# Patient Record
Sex: MALE | Race: WHITE | NOT HISPANIC OR LATINO | Employment: OTHER | URBAN - METROPOLITAN AREA
[De-identification: names, ages, dates, MRNs, and addresses within clinical notes are randomized per-mention and may not be internally consistent; named-entity substitution may affect disease eponyms.]

---

## 2017-01-25 PROBLEM — E78.5 HYPERLIPIDEMIA: Status: ACTIVE | Noted: 2017-01-25

## 2017-11-01 PROBLEM — F32.A DEPRESSION: Status: ACTIVE | Noted: 2017-11-01

## 2018-01-12 PROBLEM — R61 EXCESSIVE SWEATING: Status: ACTIVE | Noted: 2018-01-12

## 2018-01-12 PROBLEM — N40.0 PROSTATISM: Status: ACTIVE | Noted: 2018-01-12

## 2018-05-02 PROBLEM — Z86.010 HX OF COLONIC POLYP: Status: ACTIVE | Noted: 2018-05-02

## 2018-05-02 PROBLEM — Z91.030 ALLERGY TO HONEY BEE VENOM: Status: ACTIVE | Noted: 2018-05-02

## 2019-01-25 ENCOUNTER — PATIENT OUTREACH (OUTPATIENT)
Dept: HEALTH INFORMATION MANAGEMENT | Facility: OTHER | Age: 59
End: 2019-01-25

## 2019-01-26 NOTE — PROGRESS NOTES
Outcome: Left Message    Please transfer to Patient Outreach Team at [phone] when patient returns call.    WebIZ Checked & Epic Updated:  yes    [facility] Verified: yes    Attempt # 1

## 2019-01-30 NOTE — PROGRESS NOTES
Outcome: Left Message    Please transfer to Patient Outreach Team at [phone] when patient returns call.    Attempt # 2

## 2019-02-01 NOTE — PROGRESS NOTES
Outcome: Left Message    Please transfer to Patient Outreach Team at [phone] when patient returns call.    Attempt # 3

## 2019-02-06 PROBLEM — J45.20 MILD INTERMITTENT ASTHMA WITHOUT COMPLICATION: Status: ACTIVE | Noted: 2019-02-06

## 2019-02-11 PROBLEM — R91.8 PULMONARY NODULES: Status: ACTIVE | Noted: 2019-02-11

## 2019-04-10 PROBLEM — G89.29 CHRONIC LOW BACK PAIN WITHOUT SCIATICA: Status: ACTIVE | Noted: 2019-04-10

## 2019-04-10 PROBLEM — M54.50 CHRONIC LOW BACK PAIN WITHOUT SCIATICA: Status: ACTIVE | Noted: 2019-04-10

## 2019-06-05 PROBLEM — Z78.9 DAILY CONSUMPTION OF ALCOHOL: Status: ACTIVE | Noted: 2019-06-05

## 2020-09-04 PROBLEM — G25.81 RESTLESS LEG SYNDROME: Status: ACTIVE | Noted: 2020-09-04

## 2020-10-07 PROBLEM — K76.0 HEPATIC STEATOSIS: Status: ACTIVE | Noted: 2020-10-07

## 2021-01-27 PROBLEM — K56.609 SBO (SMALL BOWEL OBSTRUCTION) (HCC): Status: ACTIVE | Noted: 2021-01-27

## 2021-09-15 PROBLEM — R73.03 PREDIABETES: Status: ACTIVE | Noted: 2021-09-15

## 2022-06-06 PROBLEM — I25.10 CORONARY ARTERY DISEASE INVOLVING NATIVE CORONARY ARTERY OF NATIVE HEART WITHOUT ANGINA PECTORIS: Status: ACTIVE | Noted: 2022-06-06

## 2022-06-06 PROBLEM — I50.20 SYSTOLIC CONGESTIVE HEART FAILURE (HCC): Status: ACTIVE | Noted: 2022-06-06

## 2022-06-06 PROBLEM — I25.5 ISCHEMIC CARDIOMYOPATHY: Status: ACTIVE | Noted: 2022-06-06

## 2022-07-01 PROBLEM — N17.9 ACUTE KIDNEY FAILURE (HCC): Status: ACTIVE | Noted: 2022-07-01

## 2022-07-01 PROBLEM — E87.20 LACTIC ACIDOSIS: Status: ACTIVE | Noted: 2022-07-01

## 2022-07-02 PROBLEM — E87.20 LACTIC ACIDOSIS: Status: RESOLVED | Noted: 2022-07-01 | Resolved: 2022-07-02

## 2022-07-02 PROBLEM — K56.609 SBO (SMALL BOWEL OBSTRUCTION) (HCC): Status: RESOLVED | Noted: 2021-01-27 | Resolved: 2022-07-02

## 2022-09-13 PROBLEM — I50.32 CHRONIC DIASTOLIC HEART FAILURE (HCC): Status: ACTIVE | Noted: 2022-09-13

## 2022-09-13 PROBLEM — E66.9 OBESITY (BMI 30.0-34.9): Status: ACTIVE | Noted: 2022-09-13

## 2022-09-13 PROBLEM — R79.89 ELEVATED LACTIC ACID LEVEL: Status: ACTIVE | Noted: 2022-09-13

## 2022-09-13 PROBLEM — D72.829 LEUKOCYTOSIS: Status: ACTIVE | Noted: 2022-09-13

## 2022-09-13 PROBLEM — K56.609 SMALL BOWEL OBSTRUCTION (HCC): Status: ACTIVE | Noted: 2022-09-13

## 2023-02-28 ENCOUNTER — ANESTHESIA EVENT (OUTPATIENT)
Dept: SURGERY | Facility: MEDICAL CENTER | Age: 63
DRG: 327 | End: 2023-02-28
Payer: MEDICARE

## 2023-02-28 ENCOUNTER — HOSPITAL ENCOUNTER (OUTPATIENT)
Dept: RADIOLOGY | Facility: MEDICAL CENTER | Age: 63
End: 2023-02-28

## 2023-02-28 ENCOUNTER — HOSPITAL ENCOUNTER (INPATIENT)
Facility: MEDICAL CENTER | Age: 63
LOS: 4 days | DRG: 327 | End: 2023-03-04
Attending: HOSPITALIST | Admitting: STUDENT IN AN ORGANIZED HEALTH CARE EDUCATION/TRAINING PROGRAM
Payer: MEDICARE

## 2023-02-28 ENCOUNTER — ANESTHESIA (OUTPATIENT)
Dept: SURGERY | Facility: MEDICAL CENTER | Age: 63
DRG: 327 | End: 2023-02-28
Payer: MEDICARE

## 2023-02-28 DIAGNOSIS — K56.609 SMALL BOWEL OBSTRUCTION (HCC): ICD-10-CM

## 2023-02-28 PROBLEM — R73.9 HYPERGLYCEMIA: Status: ACTIVE | Noted: 2023-02-28

## 2023-02-28 PROBLEM — N17.9 AKI (ACUTE KIDNEY INJURY) (HCC): Status: ACTIVE | Noted: 2023-02-28

## 2023-02-28 PROBLEM — E11.9 TYPE 2 DIABETES MELLITUS, WITHOUT LONG-TERM CURRENT USE OF INSULIN (HCC): Status: ACTIVE | Noted: 2023-02-28

## 2023-02-28 PROBLEM — N17.9 ACUTE KIDNEY INJURY (HCC): Status: RESOLVED | Noted: 2022-07-01 | Resolved: 2023-02-28

## 2023-02-28 PROBLEM — D72.829 LEUKOCYTOSIS: Status: RESOLVED | Noted: 2022-09-13 | Resolved: 2023-02-28

## 2023-02-28 PROBLEM — K43.6 VENTRAL HERNIA WITH BOWEL OBSTRUCTION: Status: ACTIVE | Noted: 2023-02-28

## 2023-02-28 PROBLEM — E66.01 OBESITY, MORBID, BMI 40.0-49.9 (HCC): Status: ACTIVE | Noted: 2023-02-28

## 2023-02-28 PROBLEM — I50.30 (HFPEF) HEART FAILURE WITH PRESERVED EJECTION FRACTION (HCC): Status: ACTIVE | Noted: 2023-02-28

## 2023-02-28 PROBLEM — D72.829 LEUKOCYTOSIS: Status: ACTIVE | Noted: 2023-02-28

## 2023-02-28 LAB
ALBUMIN SERPL BCP-MCNC: 4.8 G/DL (ref 3.2–4.9)
ALBUMIN/GLOB SERPL: 1.3 G/DL
ALP SERPL-CCNC: 68 U/L (ref 30–99)
ALT SERPL-CCNC: 28 U/L (ref 2–50)
ANION GAP SERPL CALC-SCNC: 21 MMOL/L (ref 7–16)
AST SERPL-CCNC: 26 U/L (ref 12–45)
BASOPHILS # BLD AUTO: 0.4 % (ref 0–1.8)
BASOPHILS # BLD: 0.07 K/UL (ref 0–0.12)
BILIRUB SERPL-MCNC: 1.3 MG/DL (ref 0.1–1.5)
BUN SERPL-MCNC: 32 MG/DL (ref 8–22)
CALCIUM ALBUM COR SERPL-MCNC: 10 MG/DL (ref 8.5–10.5)
CALCIUM SERPL-MCNC: 10.6 MG/DL (ref 8.5–10.5)
CHLORIDE SERPL-SCNC: 98 MMOL/L (ref 96–112)
CO2 SERPL-SCNC: 20 MMOL/L (ref 20–33)
CREAT SERPL-MCNC: 1.88 MG/DL (ref 0.5–1.4)
EOSINOPHIL # BLD AUTO: 0.07 K/UL (ref 0–0.51)
EOSINOPHIL NFR BLD: 0.4 % (ref 0–6.9)
ERYTHROCYTE [DISTWIDTH] IN BLOOD BY AUTOMATED COUNT: 45.6 FL (ref 35.9–50)
EST. AVERAGE GLUCOSE BLD GHB EST-MCNC: 134 MG/DL
GFR SERPLBLD CREATININE-BSD FMLA CKD-EPI: 39 ML/MIN/1.73 M 2
GLOBULIN SER CALC-MCNC: 3.7 G/DL (ref 1.9–3.5)
GLUCOSE BLD STRIP.AUTO-MCNC: 132 MG/DL (ref 65–99)
GLUCOSE BLD STRIP.AUTO-MCNC: 154 MG/DL (ref 65–99)
GLUCOSE BLD STRIP.AUTO-MCNC: 204 MG/DL (ref 65–99)
GLUCOSE SERPL-MCNC: 214 MG/DL (ref 65–99)
HBA1C MFR BLD: 6.3 % (ref 4–5.6)
HCT VFR BLD AUTO: 49.7 % (ref 42–52)
HGB BLD-MCNC: 16.9 G/DL (ref 14–18)
IMM GRANULOCYTES # BLD AUTO: 0.08 K/UL (ref 0–0.11)
IMM GRANULOCYTES NFR BLD AUTO: 0.4 % (ref 0–0.9)
LACTATE SERPL-SCNC: 2.1 MMOL/L (ref 0.5–2)
LACTATE SERPL-SCNC: 2.1 MMOL/L (ref 0.5–2)
LACTATE SERPL-SCNC: 3.5 MMOL/L (ref 0.5–2)
LACTATE SERPL-SCNC: 7 MMOL/L (ref 0.5–2)
LYMPHOCYTES # BLD AUTO: 1.03 K/UL (ref 1–4.8)
LYMPHOCYTES NFR BLD: 5.7 % (ref 22–41)
MAGNESIUM SERPL-MCNC: 1.9 MG/DL (ref 1.5–2.5)
MCH RBC QN AUTO: 32.1 PG (ref 27–33)
MCHC RBC AUTO-ENTMCNC: 34 G/DL (ref 33.7–35.3)
MCV RBC AUTO: 94.3 FL (ref 81.4–97.8)
MONOCYTES # BLD AUTO: 2.15 K/UL (ref 0–0.85)
MONOCYTES NFR BLD AUTO: 11.9 % (ref 0–13.4)
NEUTROPHILS # BLD AUTO: 14.66 K/UL (ref 1.82–7.42)
NEUTROPHILS NFR BLD: 81.2 % (ref 44–72)
NRBC # BLD AUTO: 0.03 K/UL
NRBC BLD-RTO: 0.2 /100 WBC
PLATELET # BLD AUTO: 355 K/UL (ref 164–446)
PMV BLD AUTO: 10.4 FL (ref 9–12.9)
POTASSIUM SERPL-SCNC: 4.7 MMOL/L (ref 3.6–5.5)
PROCALCITONIN SERPL-MCNC: 0.2 NG/ML
PROT SERPL-MCNC: 8.5 G/DL (ref 6–8.2)
RBC # BLD AUTO: 5.27 M/UL (ref 4.7–6.1)
SODIUM SERPL-SCNC: 139 MMOL/L (ref 135–145)
WBC # BLD AUTO: 18.1 K/UL (ref 4.8–10.8)

## 2023-02-28 PROCEDURE — 83605 ASSAY OF LACTIC ACID: CPT

## 2023-02-28 PROCEDURE — 83036 HEMOGLOBIN GLYCOSYLATED A1C: CPT

## 2023-02-28 PROCEDURE — 0DN60ZZ RELEASE STOMACH, OPEN APPROACH: ICD-10-PCS | Performed by: SURGERY

## 2023-02-28 PROCEDURE — 85025 COMPLETE CBC W/AUTO DIFF WBC: CPT

## 2023-02-28 PROCEDURE — 700105 HCHG RX REV CODE 258: Performed by: STUDENT IN AN ORGANIZED HEALTH CARE EDUCATION/TRAINING PROGRAM

## 2023-02-28 PROCEDURE — 700105 HCHG RX REV CODE 258: Performed by: GENERAL PRACTICE

## 2023-02-28 PROCEDURE — 700111 HCHG RX REV CODE 636 W/ 250 OVERRIDE (IP): Performed by: ANESTHESIOLOGY

## 2023-02-28 PROCEDURE — 700111 HCHG RX REV CODE 636 W/ 250 OVERRIDE (IP): Performed by: GENERAL PRACTICE

## 2023-02-28 PROCEDURE — 160035 HCHG PACU - 1ST 60 MINS PHASE I: Performed by: SURGERY

## 2023-02-28 PROCEDURE — 99223 1ST HOSP IP/OBS HIGH 75: CPT | Mod: 57 | Performed by: SURGERY

## 2023-02-28 PROCEDURE — 82962 GLUCOSE BLOOD TEST: CPT

## 2023-02-28 PROCEDURE — 770001 HCHG ROOM/CARE - MED/SURG/GYN PRIV*

## 2023-02-28 PROCEDURE — 0DN80ZZ RELEASE SMALL INTESTINE, OPEN APPROACH: ICD-10-PCS | Performed by: SURGERY

## 2023-02-28 PROCEDURE — 700111 HCHG RX REV CODE 636 W/ 250 OVERRIDE (IP)

## 2023-02-28 PROCEDURE — 160031 HCHG SURGERY MINUTES - 1ST 30 MINS LEVEL 5: Performed by: SURGERY

## 2023-02-28 PROCEDURE — 700111 HCHG RX REV CODE 636 W/ 250 OVERRIDE (IP): Performed by: STUDENT IN AN ORGANIZED HEALTH CARE EDUCATION/TRAINING PROGRAM

## 2023-02-28 PROCEDURE — 700102 HCHG RX REV CODE 250 W/ 637 OVERRIDE(OP): Performed by: GENERAL PRACTICE

## 2023-02-28 PROCEDURE — 36620 INSERTION CATHETER ARTERY: CPT | Performed by: ANESTHESIOLOGY

## 2023-02-28 PROCEDURE — 700101 HCHG RX REV CODE 250: Performed by: STUDENT IN AN ORGANIZED HEALTH CARE EDUCATION/TRAINING PROGRAM

## 2023-02-28 PROCEDURE — 49000 EXPLORATION OF ABDOMEN: CPT | Performed by: SURGERY

## 2023-02-28 PROCEDURE — 160036 HCHG PACU - EA ADDL 30 MINS PHASE I: Performed by: SURGERY

## 2023-02-28 PROCEDURE — 00790 ANES IPER UPR ABD NOS: CPT | Performed by: ANESTHESIOLOGY

## 2023-02-28 PROCEDURE — 160009 HCHG ANES TIME/MIN: Performed by: SURGERY

## 2023-02-28 PROCEDURE — 80053 COMPREHEN METABOLIC PANEL: CPT

## 2023-02-28 PROCEDURE — 700101 HCHG RX REV CODE 250: Performed by: ANESTHESIOLOGY

## 2023-02-28 PROCEDURE — 160048 HCHG OR STATISTICAL LEVEL 1-5: Performed by: SURGERY

## 2023-02-28 PROCEDURE — 84145 PROCALCITONIN (PCT): CPT

## 2023-02-28 PROCEDURE — 0DNE0ZZ RELEASE LARGE INTESTINE, OPEN APPROACH: ICD-10-PCS | Performed by: SURGERY

## 2023-02-28 PROCEDURE — 700111 HCHG RX REV CODE 636 W/ 250 OVERRIDE (IP): Performed by: SURGERY

## 2023-02-28 PROCEDURE — 36415 COLL VENOUS BLD VENIPUNCTURE: CPT

## 2023-02-28 PROCEDURE — 99223 1ST HOSP IP/OBS HIGH 75: CPT | Mod: AI,GC | Performed by: STUDENT IN AN ORGANIZED HEALTH CARE EDUCATION/TRAINING PROGRAM

## 2023-02-28 PROCEDURE — 160002 HCHG RECOVERY MINUTES (STAT): Performed by: SURGERY

## 2023-02-28 PROCEDURE — 700105 HCHG RX REV CODE 258: Performed by: ANESTHESIOLOGY

## 2023-02-28 PROCEDURE — 160042 HCHG SURGERY MINUTES - EA ADDL 1 MIN LEVEL 5: Performed by: SURGERY

## 2023-02-28 PROCEDURE — 83735 ASSAY OF MAGNESIUM: CPT

## 2023-02-28 PROCEDURE — C9399 UNCLASSIFIED DRUGS OR BIOLOG: HCPCS | Performed by: SURGERY

## 2023-02-28 RX ORDER — HYDROMORPHONE HYDROCHLORIDE 1 MG/ML
0.2 INJECTION, SOLUTION INTRAMUSCULAR; INTRAVENOUS; SUBCUTANEOUS
Status: DISCONTINUED | OUTPATIENT
Start: 2023-02-28 | End: 2023-02-28 | Stop reason: HOSPADM

## 2023-02-28 RX ORDER — HYDROMORPHONE HYDROCHLORIDE 1 MG/ML
0.1 INJECTION, SOLUTION INTRAMUSCULAR; INTRAVENOUS; SUBCUTANEOUS
Status: DISCONTINUED | OUTPATIENT
Start: 2023-02-28 | End: 2023-02-28

## 2023-02-28 RX ORDER — HYDROMORPHONE HYDROCHLORIDE 1 MG/ML
0.4 INJECTION, SOLUTION INTRAMUSCULAR; INTRAVENOUS; SUBCUTANEOUS
Status: DISCONTINUED | OUTPATIENT
Start: 2023-02-28 | End: 2023-02-28 | Stop reason: HOSPADM

## 2023-02-28 RX ORDER — SUCCINYLCHOLINE CHLORIDE 20 MG/ML
INJECTION INTRAMUSCULAR; INTRAVENOUS PRN
Status: DISCONTINUED | OUTPATIENT
Start: 2023-02-28 | End: 2023-02-28 | Stop reason: SURG

## 2023-02-28 RX ORDER — SODIUM CHLORIDE, SODIUM LACTATE, POTASSIUM CHLORIDE, CALCIUM CHLORIDE 600; 310; 30; 20 MG/100ML; MG/100ML; MG/100ML; MG/100ML
INJECTION, SOLUTION INTRAVENOUS CONTINUOUS
Status: DISCONTINUED | OUTPATIENT
Start: 2023-02-28 | End: 2023-02-28 | Stop reason: HOSPADM

## 2023-02-28 RX ORDER — HYDROMORPHONE HYDROCHLORIDE 1 MG/ML
0.1 INJECTION, SOLUTION INTRAMUSCULAR; INTRAVENOUS; SUBCUTANEOUS
Status: DISCONTINUED | OUTPATIENT
Start: 2023-02-28 | End: 2023-02-28 | Stop reason: HOSPADM

## 2023-02-28 RX ORDER — CEFOTETAN DISODIUM 2 G/20ML
INJECTION, POWDER, FOR SOLUTION INTRAMUSCULAR; INTRAVENOUS PRN
Status: DISCONTINUED | OUTPATIENT
Start: 2023-02-28 | End: 2023-02-28 | Stop reason: SURG

## 2023-02-28 RX ORDER — HYDROMORPHONE HYDROCHLORIDE 1 MG/ML
0.4 INJECTION, SOLUTION INTRAMUSCULAR; INTRAVENOUS; SUBCUTANEOUS
Status: DISCONTINUED | OUTPATIENT
Start: 2023-02-28 | End: 2023-02-28

## 2023-02-28 RX ORDER — HYDROMORPHONE HYDROCHLORIDE 1 MG/ML
0.2 INJECTION, SOLUTION INTRAMUSCULAR; INTRAVENOUS; SUBCUTANEOUS
Status: DISCONTINUED | OUTPATIENT
Start: 2023-02-28 | End: 2023-02-28

## 2023-02-28 RX ORDER — EPHEDRINE SULFATE 50 MG/ML
5 INJECTION, SOLUTION INTRAVENOUS
Status: DISCONTINUED | OUTPATIENT
Start: 2023-02-28 | End: 2023-02-28 | Stop reason: HOSPADM

## 2023-02-28 RX ORDER — HALOPERIDOL 5 MG/ML
1 INJECTION INTRAMUSCULAR
Status: DISCONTINUED | OUTPATIENT
Start: 2023-02-28 | End: 2023-02-28 | Stop reason: HOSPADM

## 2023-02-28 RX ORDER — SODIUM CHLORIDE, SODIUM LACTATE, POTASSIUM CHLORIDE, CALCIUM CHLORIDE 600; 310; 30; 20 MG/100ML; MG/100ML; MG/100ML; MG/100ML
INJECTION, SOLUTION INTRAVENOUS
Status: DISCONTINUED | OUTPATIENT
Start: 2023-02-28 | End: 2023-02-28 | Stop reason: SURG

## 2023-02-28 RX ORDER — SODIUM CHLORIDE, SODIUM LACTATE, POTASSIUM CHLORIDE, AND CALCIUM CHLORIDE .6; .31; .03; .02 G/100ML; G/100ML; G/100ML; G/100ML
500 INJECTION, SOLUTION INTRAVENOUS ONCE
Status: COMPLETED | OUTPATIENT
Start: 2023-02-28 | End: 2023-02-28

## 2023-02-28 RX ORDER — SODIUM CHLORIDE, SODIUM LACTATE, POTASSIUM CHLORIDE, CALCIUM CHLORIDE 600; 310; 30; 20 MG/100ML; MG/100ML; MG/100ML; MG/100ML
INJECTION, SOLUTION INTRAVENOUS CONTINUOUS
Status: ACTIVE | OUTPATIENT
Start: 2023-02-28 | End: 2023-02-28

## 2023-02-28 RX ORDER — HYDRALAZINE HYDROCHLORIDE 20 MG/ML
5 INJECTION INTRAMUSCULAR; INTRAVENOUS
Status: DISCONTINUED | OUTPATIENT
Start: 2023-02-28 | End: 2023-02-28 | Stop reason: HOSPADM

## 2023-02-28 RX ORDER — PHENYLEPHRINE HCL IN 0.9% NACL 0.5 MG/5ML
SYRINGE (ML) INTRAVENOUS PRN
Status: DISCONTINUED | OUTPATIENT
Start: 2023-02-28 | End: 2023-02-28 | Stop reason: SURG

## 2023-02-28 RX ORDER — DIPHENHYDRAMINE HYDROCHLORIDE 50 MG/ML
12.5 INJECTION INTRAMUSCULAR; INTRAVENOUS
Status: DISCONTINUED | OUTPATIENT
Start: 2023-02-28 | End: 2023-02-28 | Stop reason: HOSPADM

## 2023-02-28 RX ORDER — METRONIDAZOLE 500 MG/100ML
500 INJECTION, SOLUTION INTRAVENOUS EVERY 8 HOURS
Status: DISCONTINUED | OUTPATIENT
Start: 2023-02-28 | End: 2023-03-01

## 2023-02-28 RX ORDER — ALBUTEROL SULFATE 2.5 MG/3ML
2.5 SOLUTION RESPIRATORY (INHALATION)
Status: DISCONTINUED | OUTPATIENT
Start: 2023-02-28 | End: 2023-02-28 | Stop reason: HOSPADM

## 2023-02-28 RX ORDER — ONDANSETRON 2 MG/ML
INJECTION INTRAMUSCULAR; INTRAVENOUS PRN
Status: DISCONTINUED | OUTPATIENT
Start: 2023-02-28 | End: 2023-02-28 | Stop reason: SURG

## 2023-02-28 RX ORDER — HYDROMORPHONE HYDROCHLORIDE 1 MG/ML
0.5 INJECTION, SOLUTION INTRAMUSCULAR; INTRAVENOUS; SUBCUTANEOUS
Status: DISCONTINUED | OUTPATIENT
Start: 2023-02-28 | End: 2023-03-01

## 2023-02-28 RX ORDER — KETAMINE HYDROCHLORIDE 100 MG/ML
INJECTION, SOLUTION INTRAMUSCULAR; INTRAVENOUS PRN
Status: DISCONTINUED | OUTPATIENT
Start: 2023-02-28 | End: 2023-02-28 | Stop reason: SURG

## 2023-02-28 RX ORDER — MIDAZOLAM HYDROCHLORIDE 1 MG/ML
INJECTION INTRAMUSCULAR; INTRAVENOUS PRN
Status: DISCONTINUED | OUTPATIENT
Start: 2023-02-28 | End: 2023-02-28 | Stop reason: SURG

## 2023-02-28 RX ORDER — ONDANSETRON 2 MG/ML
4 INJECTION INTRAMUSCULAR; INTRAVENOUS
Status: DISCONTINUED | OUTPATIENT
Start: 2023-02-28 | End: 2023-02-28 | Stop reason: HOSPADM

## 2023-02-28 RX ORDER — SODIUM CHLORIDE, SODIUM LACTATE, POTASSIUM CHLORIDE, AND CALCIUM CHLORIDE .6; .31; .03; .02 G/100ML; G/100ML; G/100ML; G/100ML
1000 INJECTION, SOLUTION INTRAVENOUS ONCE
Status: DISCONTINUED | OUTPATIENT
Start: 2023-02-28 | End: 2023-02-28

## 2023-02-28 RX ORDER — PROCHLORPERAZINE EDISYLATE 5 MG/ML
5 INJECTION INTRAMUSCULAR; INTRAVENOUS EVERY 6 HOURS PRN
Status: DISCONTINUED | OUTPATIENT
Start: 2023-02-28 | End: 2023-03-04 | Stop reason: HOSPADM

## 2023-02-28 RX ORDER — LABETALOL HYDROCHLORIDE 5 MG/ML
5 INJECTION, SOLUTION INTRAVENOUS
Status: DISCONTINUED | OUTPATIENT
Start: 2023-02-28 | End: 2023-02-28 | Stop reason: HOSPADM

## 2023-02-28 RX ORDER — SODIUM CHLORIDE, SODIUM LACTATE, POTASSIUM CHLORIDE, CALCIUM CHLORIDE 600; 310; 30; 20 MG/100ML; MG/100ML; MG/100ML; MG/100ML
INJECTION, SOLUTION INTRAVENOUS CONTINUOUS
Status: DISCONTINUED | OUTPATIENT
Start: 2023-02-28 | End: 2023-03-02

## 2023-02-28 RX ORDER — DEXAMETHASONE SODIUM PHOSPHATE 4 MG/ML
INJECTION, SOLUTION INTRA-ARTICULAR; INTRALESIONAL; INTRAMUSCULAR; INTRAVENOUS; SOFT TISSUE PRN
Status: DISCONTINUED | OUTPATIENT
Start: 2023-02-28 | End: 2023-02-28 | Stop reason: SURG

## 2023-02-28 RX ADMIN — METRONIDAZOLE 500 MG: 5 INJECTION, SOLUTION INTRAVENOUS at 15:18

## 2023-02-28 RX ADMIN — HYDROMORPHONE HYDROCHLORIDE 0.5 MG: 1 INJECTION, SOLUTION INTRAMUSCULAR; INTRAVENOUS; SUBCUTANEOUS at 07:29

## 2023-02-28 RX ADMIN — IDARUCIZUMAB 2.5 G: 50 INJECTION INTRAVENOUS at 09:15

## 2023-02-28 RX ADMIN — FENTANYL CITRATE 50 MCG: 50 INJECTION, SOLUTION INTRAMUSCULAR; INTRAVENOUS at 12:37

## 2023-02-28 RX ADMIN — SODIUM CHLORIDE, POTASSIUM CHLORIDE, SODIUM LACTATE AND CALCIUM CHLORIDE 500 ML: 600; 310; 30; 20 INJECTION, SOLUTION INTRAVENOUS at 06:35

## 2023-02-28 RX ADMIN — PROPOFOL 250 MG: 10 INJECTION, EMULSION INTRAVENOUS at 11:09

## 2023-02-28 RX ADMIN — HYDROMORPHONE HYDROCHLORIDE 0.5 MG: 1 INJECTION, SOLUTION INTRAMUSCULAR; INTRAVENOUS; SUBCUTANEOUS at 03:19

## 2023-02-28 RX ADMIN — ROCURONIUM BROMIDE 30 MG: 10 INJECTION, SOLUTION INTRAVENOUS at 12:11

## 2023-02-28 RX ADMIN — INSULIN HUMAN 2 UNITS: 100 INJECTION, SOLUTION PARENTERAL at 08:12

## 2023-02-28 RX ADMIN — SUCCINYLCHOLINE CHLORIDE 200 MG: 20 INJECTION, SOLUTION INTRAMUSCULAR; INTRAVENOUS; PARENTERAL at 11:09

## 2023-02-28 RX ADMIN — METRONIDAZOLE 500 MG: 5 INJECTION, SOLUTION INTRAVENOUS at 22:40

## 2023-02-28 RX ADMIN — INSULIN HUMAN 1 UNITS: 100 INJECTION, SOLUTION PARENTERAL at 17:40

## 2023-02-28 RX ADMIN — HYDROMORPHONE HYDROCHLORIDE 0.5 MG: 1 INJECTION, SOLUTION INTRAMUSCULAR; INTRAVENOUS; SUBCUTANEOUS at 20:31

## 2023-02-28 RX ADMIN — HYDROMORPHONE HYDROCHLORIDE 0.5 MG: 1 INJECTION, SOLUTION INTRAMUSCULAR; INTRAVENOUS; SUBCUTANEOUS at 16:37

## 2023-02-28 RX ADMIN — Medication 200 MCG: at 12:02

## 2023-02-28 RX ADMIN — KETAMINE HYDROCHLORIDE 40 MG: 100 INJECTION INTRAMUSCULAR; INTRAVENOUS at 11:52

## 2023-02-28 RX ADMIN — CEFOTETAN DISODIUM 2 G: 2 INJECTION, POWDER, FOR SOLUTION INTRAMUSCULAR; INTRAVENOUS at 11:09

## 2023-02-28 RX ADMIN — FENTANYL CITRATE 50 MCG: 50 INJECTION, SOLUTION INTRAMUSCULAR; INTRAVENOUS at 13:35

## 2023-02-28 RX ADMIN — SODIUM CHLORIDE, POTASSIUM CHLORIDE, SODIUM LACTATE AND CALCIUM CHLORIDE: 600; 310; 30; 20 INJECTION, SOLUTION INTRAVENOUS at 03:24

## 2023-02-28 RX ADMIN — IDARUCIZUMAB 2.5 G: 50 INJECTION INTRAVENOUS at 09:25

## 2023-02-28 RX ADMIN — SUGAMMADEX 200 MG: 100 INJECTION, SOLUTION INTRAVENOUS at 12:28

## 2023-02-28 RX ADMIN — HYDROMORPHONE HYDROCHLORIDE 0.4 MG: 1 INJECTION, SOLUTION INTRAMUSCULAR; INTRAVENOUS; SUBCUTANEOUS at 13:55

## 2023-02-28 RX ADMIN — HYDROMORPHONE HYDROCHLORIDE 0.4 MG: 1 INJECTION, SOLUTION INTRAMUSCULAR; INTRAVENOUS; SUBCUTANEOUS at 13:45

## 2023-02-28 RX ADMIN — SUGAMMADEX 200 MG: 100 INJECTION, SOLUTION INTRAVENOUS at 12:33

## 2023-02-28 RX ADMIN — MIDAZOLAM HYDROCHLORIDE 2 MG: 1 INJECTION, SOLUTION INTRAMUSCULAR; INTRAVENOUS at 11:04

## 2023-02-28 RX ADMIN — PROCHLORPERAZINE EDISYLATE 5 MG: 5 INJECTION INTRAMUSCULAR; INTRAVENOUS at 03:19

## 2023-02-28 RX ADMIN — CEFTRIAXONE SODIUM 2000 MG: 10 INJECTION, POWDER, FOR SOLUTION INTRAVENOUS at 16:37

## 2023-02-28 RX ADMIN — DEXAMETHASONE SODIUM PHOSPHATE 4 MG: 4 INJECTION, SOLUTION INTRA-ARTICULAR; INTRALESIONAL; INTRAMUSCULAR; INTRAVENOUS; SOFT TISSUE at 12:26

## 2023-02-28 RX ADMIN — FENTANYL CITRATE 50 MCG: 50 INJECTION, SOLUTION INTRAMUSCULAR; INTRAVENOUS at 13:40

## 2023-02-28 RX ADMIN — FENTANYL CITRATE 50 MCG: 50 INJECTION, SOLUTION INTRAMUSCULAR; INTRAVENOUS at 11:06

## 2023-02-28 RX ADMIN — SODIUM CHLORIDE, POTASSIUM CHLORIDE, SODIUM LACTATE AND CALCIUM CHLORIDE: 600; 310; 30; 20 INJECTION, SOLUTION INTRAVENOUS at 15:13

## 2023-02-28 RX ADMIN — SODIUM CHLORIDE, POTASSIUM CHLORIDE, SODIUM LACTATE AND CALCIUM CHLORIDE: 600; 310; 30; 20 INJECTION, SOLUTION INTRAVENOUS at 11:02

## 2023-02-28 RX ADMIN — PROCHLORPERAZINE EDISYLATE 5 MG: 5 INJECTION INTRAMUSCULAR; INTRAVENOUS at 09:20

## 2023-02-28 RX ADMIN — PROCHLORPERAZINE EDISYLATE 5 MG: 5 INJECTION INTRAMUSCULAR; INTRAVENOUS at 20:31

## 2023-02-28 RX ADMIN — FENTANYL CITRATE 50 MCG: 50 INJECTION, SOLUTION INTRAMUSCULAR; INTRAVENOUS at 12:06

## 2023-02-28 RX ADMIN — FENTANYL CITRATE 50 MCG: 50 INJECTION, SOLUTION INTRAMUSCULAR; INTRAVENOUS at 11:41

## 2023-02-28 RX ADMIN — HYDROMORPHONE HYDROCHLORIDE 0.2 MG: 1 INJECTION, SOLUTION INTRAMUSCULAR; INTRAVENOUS; SUBCUTANEOUS at 14:00

## 2023-02-28 RX ADMIN — ONDANSETRON 4 MG: 2 INJECTION INTRAMUSCULAR; INTRAVENOUS at 12:26

## 2023-02-28 RX ADMIN — ROCURONIUM BROMIDE 50 MG: 10 INJECTION, SOLUTION INTRAVENOUS at 11:15

## 2023-02-28 ASSESSMENT — FIBROSIS 4 INDEX: FIB4 SCORE: 0.9

## 2023-02-28 ASSESSMENT — PATIENT HEALTH QUESTIONNAIRE - PHQ9
8. MOVING OR SPEAKING SO SLOWLY THAT OTHER PEOPLE COULD HAVE NOTICED. OR THE OPPOSITE, BEING SO FIGETY OR RESTLESS THAT YOU HAVE BEEN MOVING AROUND A LOT MORE THAN USUAL: NOT AT ALL
2. FEELING DOWN, DEPRESSED, IRRITABLE, OR HOPELESS: SEVERAL DAYS
4. FEELING TIRED OR HAVING LITTLE ENERGY: SEVERAL DAYS
3. TROUBLE FALLING OR STAYING ASLEEP OR SLEEPING TOO MUCH: NOT AT ALL
SUM OF ALL RESPONSES TO PHQ9 QUESTIONS 1 AND 2: 1
9. THOUGHTS THAT YOU WOULD BE BETTER OFF DEAD, OR OF HURTING YOURSELF: NOT AT ALL
5. POOR APPETITE OR OVEREATING: NOT AT ALL
SUM OF ALL RESPONSES TO PHQ QUESTIONS 1-9: 2
7. TROUBLE CONCENTRATING ON THINGS, SUCH AS READING THE NEWSPAPER OR WATCHING TELEVISION: NOT AT ALL
6. FEELING BAD ABOUT YOURSELF - OR THAT YOU ARE A FAILURE OR HAVE LET YOURSELF OR YOUR FAMILY DOWN: NOT AL ALL
1. LITTLE INTEREST OR PLEASURE IN DOING THINGS: NOT AT ALL

## 2023-02-28 ASSESSMENT — COGNITIVE AND FUNCTIONAL STATUS - GENERAL
SUGGESTED CMS G CODE MODIFIER MOBILITY: CK
MOVING TO AND FROM BED TO CHAIR: A LITTLE
MOBILITY SCORE: 17
HELP NEEDED FOR BATHING: A LITTLE
DRESSING REGULAR LOWER BODY CLOTHING: A LITTLE
MOVING FROM LYING ON BACK TO SITTING ON SIDE OF FLAT BED: A LITTLE
CLIMB 3 TO 5 STEPS WITH RAILING: A LITTLE
WALKING IN HOSPITAL ROOM: A LOT
TOILETING: A LITTLE
DRESSING REGULAR UPPER BODY CLOTHING: A LITTLE
TURNING FROM BACK TO SIDE WHILE IN FLAT BAD: A LITTLE
SUGGESTED CMS G CODE MODIFIER DAILY ACTIVITY: CJ
STANDING UP FROM CHAIR USING ARMS: A LITTLE
DAILY ACTIVITIY SCORE: 20

## 2023-02-28 ASSESSMENT — LIFESTYLE VARIABLES
TOTAL SCORE: 2
EVER FELT BAD OR GUILTY ABOUT YOUR DRINKING: YES
ON A TYPICAL DAY WHEN YOU DRINK ALCOHOL HOW MANY DRINKS DO YOU HAVE: 2
ALCOHOL_USE: YES
HAVE PEOPLE ANNOYED YOU BY CRITICIZING YOUR DRINKING: NO
CONSUMPTION TOTAL: POSITIVE
TOTAL SCORE: 2
DOES PATIENT WANT TO TALK TO SOMEONE ABOUT QUITTING: NO
HAVE YOU EVER FELT YOU SHOULD CUT DOWN ON YOUR DRINKING: YES
EVER HAD A DRINK FIRST THING IN THE MORNING TO STEADY YOUR NERVES TO GET RID OF A HANGOVER: NO
TOTAL SCORE: 2
AVERAGE NUMBER OF DAYS PER WEEK YOU HAVE A DRINK CONTAINING ALCOHOL: 7
DOES PATIENT WANT TO STOP DRINKING: YES
HOW MANY TIMES IN THE PAST YEAR HAVE YOU HAD 5 OR MORE DRINKS IN A DAY: 150

## 2023-02-28 ASSESSMENT — ENCOUNTER SYMPTOMS
COUGH: 0
HEARTBURN: 0
SHORTNESS OF BREATH: 0
HEADACHES: 0
PALPITATIONS: 0
VOMITING: 1
DIARRHEA: 0
DIZZINESS: 0
FEVER: 0
CHILLS: 0
NAUSEA: 1
CONSTIPATION: 0
ABDOMINAL PAIN: 1
WEAKNESS: 0

## 2023-02-28 ASSESSMENT — PAIN DESCRIPTION - PAIN TYPE
TYPE: ACUTE PAIN
TYPE: SURGICAL PAIN
TYPE: ACUTE PAIN;SURGICAL PAIN
TYPE: ACUTE PAIN
TYPE: ACUTE PAIN;SURGICAL PAIN

## 2023-02-28 ASSESSMENT — PAIN SCALES - GENERAL: PAIN_LEVEL: 4

## 2023-02-28 NOTE — OR NURSING
1239: Pt arrived from OR, handoff received from anesthesiologist and RN. Patient waking up, moving all extremities, denies pain and nausea. NG tube in place-low continuous suction, per order. Delacruz catheter draining to gravity. Patient with chronic afib, rate 100s-anesthesia aware.     1300: Patient more awake, states mild pain-anesthesia paged for orders.     1315: Patient sleeping, appears comfortable.     1330: Medicated for pain per MAR. Patient's daughter updated on status.     1400: Continuing to medicate for pain, per pt, pain more tolerable.     1415: Report given to T4 Fransisco NÚÑEZ.

## 2023-02-28 NOTE — ANESTHESIA PROCEDURE NOTES
Airway    Date/Time: 2/28/2023 11:10 AM  Performed by: Williams Santizo M.D.  Authorized by: Williams Santizo M.D.     Location:  OR  Urgency:  Elective  Indications for Airway Management:  Anesthesia      Spontaneous Ventilation: absent    Sedation Level:  Deep  Preoxygenated: Yes    Patient Position:  Sniffing  Mask Difficulty Assessment:  0 - not attempted  Final Airway Type:  Endotracheal airway  Final Endotracheal Airway:  ETT  Cuffed: Yes    Technique Used for Successful ETT Placement:  Video laryngoscopy    Insertion Site:  Oral  Blade Type:  Glide  Laryngoscope Blade/Videolaryngoscope Blade Size:  4  ETT Size (mm):  8.0  Measured from:  Lips  ETT to Lips (cm):  24  Placement Verified by: auscultation and capnometry    Cormack-Lehane Classification:  Grade I - full view of glottis  Number of Attempts at Approach:  1  Number of Other Approaches Attempted:  0

## 2023-02-28 NOTE — PROGRESS NOTES
Patient has a rising lactate of 7 from 4.4 since admission.  Patient's pain is improved and looks nontoxic on exam.  Discussed with general surgery on-call, Dr. Cardozo, need for surgery.  Dr. Cardozo that he would see the patient but asked if the patient wanted surgery.  Went to bedside and asked the patient and he reported he was not particularly interested in having surgery and wanted to have it done at [facility] but emergently he would consent to surgery.  Would need reversal for his Pradaxa which is last given on 2/27/2023 at 1500 approximately.  We will get a repeat lactate now and in 4 hours and give 1 L fluid bolus and continue maintenance fluids.  Recommend primary team this morning to do serial abdominal exams and follow-up on lactate.

## 2023-02-28 NOTE — PROGRESS NOTES
NG tube placed per orders, via right nare. NG tube set to low, continuous suction per orders. Pt tolerated well.    0 Hour(s) 15 Minute(s)

## 2023-02-28 NOTE — CONSULTS
DATE OF CONSULTATION:  2/28/2023     REFERRING PHYSICIAN:   Lavell Ivan Jr., M.D.    CONSULTING PHYSICIAN:  Jesse Cardozo M.D.     REASON FOR CONSULTATION:  I have been asked by Dr.George JO ANN Ivan Jr., M.D.to see the patient in surgical consultation for evaluation of bowel obstruction increasing WBC, increasing lactic acidosis.    HISTORY OF PRESENT ILLNESS: Yonny Wallace is a very pleasant 63-year-old male admitted in transfer outside hospital bowel obstruction.  Pradaxa atrial fibrillation.  He is awake alert and appropriate.  He reports the pain began after bowling [place].  He states pain gradually became worse yesterday sought care at hospital.  He was evaluated outside hospital including CT scan demonstrating bowel obstruction.  He was transferred to [facility] for further care.  Receiving care medicine service.  This morning noted elevation of white blood count and lactic acid.  General surgery was thanconsulted.    Patient reports ongoing abdominal pain .  He states pain generalized.  He states pain 8 out of 10.  Greatest site of ventral hernia.  Associated nausea.  He reports several prior hernia repairs with occurrences of similar pain.      PAST MEDICAL HISTORY:  has a past medical history of Arrhythmia, Bowel obstruction ([facility]), H/O abdominal abscess (02/10/2008), H/O TIA (transient ischemic attack) and stroke (04/01/2012), Hernias of the abdominal cavity, Hyperlipidemia, Hypertension, MI (myocardial infarction) ([facility]) (11/05/2015), Morbid obesity ([facility]), Obesity, Sleep apnea, and Stroke ([facility]).    PAST SURGICAL HISTORY:  has a past surgical history that includes appendectomy (1978); splenectomy (1978); colectomy (12/15/2005); skin abscess incision and drainage (12/23/2005); incision and drainage general (02/10/2008); lysis adhesions general (07/23/2008); umbilical hernia repair (12/30/2004); hernia repair (02/05/2008); incision hernia repair (07/23/2008); other cardiac surgery; ventral hernia  repair (11/13/2013); wound dehiscence (11/19/2013); and ir recovery room (12/30/2013).    ALLERGIES:   Allergies   Allergen Reactions    Bee Venom Anaphylaxis    Morphine Itching       CURRENT MEDICATIONS:    Home Medications       Reviewed by Gina Javier R.N. (Registered Nurse) on 02/28/23 at 0414  Med List Status: Not Addressed     Medication Last Dose Status   albuterol 108 (90 Base) MCG/ACT Aero Soln inhalation aerosol 2/27/2023 Active   amLODIPine (NORVASC) 5 MG Tab  Active   Blood Glucose Monitoring Suppl SUPPLIES Misc  Active   diphenhydrAMINE (BENADRYL) 25 MG TABS  Active   EPINEPHrine (EPIPEN) 0.3 MG/0.3ML Solution Auto-injector solution for injection  Active   FLUoxetine (PROZAC) 20 MG Cap 2/27/2023 Active   furosemide (LASIX) 20 MG Tab 2/27/2023 Active   gabapentin (NEURONTIN) 300 MG Cap 2/27/2023 Active   hydrocortisone 2.5 % Cream topical cream 2/27/2023 Active   hydrocortisone rectal (PERIANAL) 2.5% Cream 2/27/2023 Active   Lancets Misc  Active   metoprolol tartrate (LOPRESSOR) 100 MG Tab 2/27/2023 Active   MOVANTIK 25 MG Tab 2/27/2023 Active   oxyCODONE HCl (OXYCONTIN PO) 2/27/2023 Active   oxyCODONE-acetaminophen (PERCOCET-10)  MG Tab 2/27/2023 Active   polyethylene glycol/lytes (MIRALAX) 17 g Pack  Active   potassium chloride (MICRO-K) 10 MEQ capsule  Active   PRADAXA 150 MG Cap capsule 2/27/2023 Active   pravastatin (PRAVACHOL) 40 MG tablet 2/27/2023 Active   Respiratory Therapy Supplies (CARETOUCH 2 CPAP HOSE ) Misc  Active   ROPINIRole (REQUIP) 0.25 MG Tab 2/27/2023 Active   ROPINIRole (REQUIP) 1 MG Tab 2/27/2023 Active   terazosin (HYTRIN) 1 MG Cap 2/27/2023 Active   thiamine (THIAMINE) 100 MG tablet 2/27/2023 Active   tizanidine (ZANAFLEX) 2 MG tablet 2/27/2023 Active                    FAMILY HISTORY: family history includes Diabetes in his sister; Heart Disease in his father; Hypertension in his mother; Lung Disease in his father; Stroke in his father.    SOCIAL HISTORY:   reports that he quit smoking about 10 years ago. His smoking use included cigarettes. He has a 10.00 pack-year smoking history. He has never used smokeless tobacco. He reports current alcohol use of about 1.2 oz per week. He reports that he does not currently use drugs after having used the following drugs: Inhaled and Marijuana.    REVIEW OF SYSTEMS: Complete review not able to obtain due to patient condition  He states no headache no change in vision.  He states no fevers.  He states no chest pain or difficulty breathing  Positive for abdominal pain positive for nausea and vomiting    PHYSICAL EXAMINATION:    Physical Exam  /79   Pulse (!) 102   Temp 36 °C (96.8 °F) (Temporal)   Resp 20   SpO2 93%    Awake alert interactive  NG tube in place functional  Increased heart rate skin warm brisk capillary refill palpable radial pulse  Breathing with ease no cough no distress  Abdomen is distended tender tender greatest at ventral hernia site  Skin appropriate color and temperature  LABORATORY VALUES:   Recent Labs     02/27/23 2050 02/28/23  0309   WBC 14.0* 18.1*   RBC 4.95 5.27   HEMOGLOBIN 16.1 16.9   HEMATOCRIT 46.1 49.7   MCV 93.1 94.3   MCH 32.5* 32.1   MCHC 34.9 34.0   RDW 13.0 45.6   PLATELETCT 343 355   MPV 10.2 10.4     Recent Labs     02/27/23 2050 02/28/23  0309   SODIUM 140 139   POTASSIUM 4.2 4.7   CHLORIDE 100 98   CO2 23 20   GLUCOSE 159* 214*   BUN 27* 32*   CREATININE 1.8* 1.88*   CALCIUM 9.8 10.6*     Recent Labs     02/27/23 2050 02/28/23  0309   ASTSGOT 26 26   ALTSGPT 31 28   TBILIRUBIN 0.6 1.3   ALKPHOSPHAT 77 68   GLOBULIN  --  3.7*            IMAGING:   OUTSIDE IMAGES-DX CHEST   Final Result      OUTSIDE IMAGES-CT ABDOMEN /PELVIS   Final Result          ASSESSMENT AND PLAN:   Discussed findings with patient  Discussed safest course surgery  Discussed surgery high risk due to underlying health conditions and need for reversal of anticoagulation, and multiple previous  surgeries  Discussed possibility of dead bowel, possible need for staged surgery with temporary abdominal closure  Yonny Wallace demonstrated understanding.   Discussed with him surgeon of the day Dr. Can on way to see him  Medicine service discussed with pharmacy reversal anticoagulation therapy         ____________________________________     Jesse Cardozo M.D.    DD: 2/28/2023  6:41 AM    ACS NSQIP Surgical Risk Calculator

## 2023-02-28 NOTE — H&P
R Internal Medicine History & Physical Note    Date of Service  2/28/2023    R Team: CHRISTOPHER   Attending: Dr. Ramon  Senior Resident: Dr. Ivan  Contact Number: [phone]    Primary Care Physician  Carter Murphy M.D.    Consultants  none    Code Status  Full Code    Chief Complaint  No chief complaint on file.      History of Presenting Illness (HPI): Yonny Wallace is a 63 y.o. male with a history of recurrent small bowel obstructions with 3 in the past year and the most recent in September 2022 quiring multiple abdominal surgeries, large ventral hernia, atrial fibrillation on Pradaxa, HFmrEF 40-45% May 2022, TIA, CVA, HTN, MI, Sleep apnea, NIDDM2 A1c 6.5% May 2022 who presented 2/28/2023 as a transfer from [facility] who presented to the hospital for abdominal pain with CT scan demonstrating evidence of a small bowel obstruction with elevated white count.  She has had no flatus since 1400 yesterday and allowed a large meal around the same time yesterday.  In the past the patient has been recommended to go to [facility] to see a specialist as he needs an extensive abdominal wall reconstruction  Patient reports that he saw the surgeon at [facility] in November 2022 was recommended to lose 30 to 40 pounds prior to consideration for surgery; however, the patient reports he has gained about 20 pounds since then.  Reports he pulled his NG tube out in route to [facility] and has been vomiting without blood.  Reports his abdominal pain is about the same as at [place] at 8 out of 10.  Denies fever chills or any other symptoms.  Patient reports he last took Pradaxa at 3 PM on 2/27/2023.    [facility] patient was afebrile normotensive and vital signs within normal limits.  Male with softly distended with tenderness in the right abdomen with decreased bowel sounds.  CAT scan showed bowel obstruction but no suspicion for bowel ischemia with NG tube placed and given pain  medication.  Transfer due to no beds available at [facility].    WBC of 14, anion gap of 21, glucose 159, creatinine 1.8 (baseline 1.2), lipase 52, lactate 4.4    CT abdomen pelvis shows a large ventral abdominal hernia contains both tightly collapsed small bowel loops and fluid distended small bowel loops with a transition point in the hernia sac.  This is consistent with a high-grade small bowel obstruction without perforation or abscess formation related to the hernia.  No mesenteric edema suspicion for bowel ischemia.    Given 2 L of IV fluids at outside facility prior to transfer.    Chest x-ray demonstrates    I discussed the plan of care with patient.    Review of Systems  Review of Systems   Constitutional:  Negative for chills and fever.   Respiratory:  Negative for cough and shortness of breath.    Cardiovascular:  Negative for chest pain and palpitations.   Gastrointestinal:  Positive for abdominal pain, nausea and vomiting. Negative for constipation, diarrhea and heartburn.   Neurological:  Negative for dizziness, weakness and headaches.     Past Medical History   has a past medical history of Arrhythmia, Bowel obstruction ([facility]), H/O abdominal abscess (02/10/2008), H/O TIA (transient ischemic attack) and stroke (04/01/2012), Hernias of the abdominal cavity, Hyperlipidemia, Hypertension, MI (myocardial infarction) ([facility]) (11/05/2015), Morbid obesity ([facility]), Obesity, Sleep apnea, and Stroke ([facility]).    Surgical History   has a past surgical history that includes appendectomy (1978); splenectomy (1978); colectomy (12/15/2005); skin abscess incision and drainage (12/23/2005); incision and drainage general (02/10/2008); lysis adhesions general (07/23/2008); umbilical hernia repair (12/30/2004); hernia repair (02/05/2008); incision hernia repair (07/23/2008); other cardiac surgery; ventral hernia repair (11/13/2013); wound dehiscence (11/19/2013); and ir recovery room (12/30/2013).     Family  History  family history includes Diabetes in his sister; Heart Disease in his father; Hypertension in his mother; Lung Disease in his father; Stroke in his father.   Family history reviewed with patient.     Social History  Tobacco: Former 10 pack years quit   Alcohol: Beer per week  Recreational drugs (illegal or prescription): No  Employment: [employer]  Living Situation: lives alone  Recent Travel: [place]  Primary Care Provider: Reviewed    Other (stressors, spirituality, exposures): None    Allergies  Allergies   Allergen Reactions    Bee Venom Anaphylaxis    Morphine Itching       Medications  Prior to Admission Medications   Prescriptions Last Dose Informant Patient Reported? Taking?   Blood Glucose Monitoring Suppl SUPPLIES Misc   No No   Sig: Test strips for Freestyle meter. Use to test blood sugars 2-3 times daily, Diagnosis code E11.9   EPINEPHrine (EPIPEN) 0.3 MG/0.3ML Solution Auto-injector solution for injection   No No   Sig: Inject 0.3 mL into the shoulder, thigh, or buttocks as needed.   FLUoxetine (PROZAC) 20 MG Cap   No No   Sig: TAKE THREE CAPSULES BY MOUTH EVERY MORNING   Lancets Misc   No No   Sig: Lancets for Freestyle meter. Use to test blood sugars 2-3 times daily, Diagnosis code E11.9   MOVANTIK 25 MG Tab   No No   Sig: TAKE ONE TABLET BY MOUTH OR FEEDING TUBE DAILY   PRADAXA 150 MG Cap capsule   No No   Sig: TAKE ONE CAPSULE BY MOUTH TWICE A DAY   ROPINIRole (REQUIP) 0.25 MG Tab   No No   Sig: TAKE 3 TO 7 TABLETS BY MOUTH AT BEDTIME WITH 1 MG TABLET AS DICTATED BY SYMPTOMS   ROPINIRole (REQUIP) 1 MG Tab   No No   Si tab at hs with an increasing amount of the 0.25 mg tablets as directed   Respiratory Therapy Supplies (CARETOUCH 2 CPAP HOSE ) Misc   Yes No   Sig: CPAP   albuterol 108 (90 Base) MCG/ACT Aero Soln inhalation aerosol   No No   Sig: INHALE TWO PUFFS BY MOUTH EVERY 6 HOURS AS NEEDED FOR SHORTNESS OF BREATH - taking more often lately    amLODIPine (NORVASC) 5 MG Tab   No No   Sig: Take 1 Tablet by mouth every day.   diphenhydrAMINE (BENADRYL) 25 MG TABS   Yes No   Sig: Take 50 mg by mouth every 6 hours as needed.   furosemide (LASIX) 20 MG Tab   No No   Si tablets daily in am   gabapentin (NEURONTIN) 300 MG Cap   No No   Sig: TAKE ONE CAPSULE BY MOUTH THREE TIMES A DAY   hydrocortisone 2.5 % Cream topical cream   No No   Si times daily as needed   hydrocortisone rectal (PERIANAL) 2.5% Cream   No No   Sig: APPLY TO AFFECTED AREA(S) TWO TIMES A DAY AS DIRECTED   metoprolol tartrate (LOPRESSOR) 100 MG Tab   No No   Sig: Take 1 Tablet by mouth 2 times a day.   oxyCODONE HCl (OXYCONTIN PO)   Yes No   Sig: Take 15 mg by mouth 2 Times a Day.   oxyCODONE-acetaminophen (PERCOCET-10)  MG Tab   Yes No   Sig: Take 1 Tab by mouth every four hours as needed for Severe Pain.   polyethylene glycol/lytes (MIRALAX) 17 g Pack   No No   Sig: Take 1 Packet by mouth every day.   potassium chloride (MICRO-K) 10 MEQ capsule   No No   Sig: Take 1 capsule by mouth 2 times a day.   pravastatin (PRAVACHOL) 40 MG tablet   No No   Sig: TAKE ONE TABLET BY MOUTH EVERY EVENING   terazosin (HYTRIN) 1 MG Cap   No No   Sig: TAKE ONE CAPSULE BY MOUTH EVERY MORNING AND TAKE TWO CAPSULES EVERY NIGHT AT BEDTIME   thiamine (THIAMINE) 100 MG tablet   No No   Sig: Take 1 tablet by mouth every day.   tizanidine (ZANAFLEX) 2 MG tablet   Yes No   Sig: Take 1 mg by mouth every day.      Facility-Administered Medications: None       Physical Exam  Temp:  [37 °C (98.6 °F)] 37 °C (98.6 °F)  Pulse:  [82-97] 97  Resp:  [16] 16  BP: (112-126)/(68-84) 112/68  SpO2:  [96 %-98 %] 98 %                          Physical Exam  Vitals and nursing note reviewed.   Constitutional:       General: He is not in acute distress.     Appearance: He is ill-appearing. He is not toxic-appearing or diaphoretic.      Comments: Sitting on the side of the bed with an emesis bag   HENT:      Head:  Normocephalic and atraumatic.      Mouth/Throat:      Mouth: Mucous membranes are dry.      Pharynx: No oropharyngeal exudate or posterior oropharyngeal erythema.   Eyes:      Conjunctiva/sclera: Conjunctivae normal.   Cardiovascular:      Rate and Rhythm: Normal rate and regular rhythm.      Pulses: Normal pulses.      Heart sounds: Normal heart sounds. No murmur heard.    No friction rub. No gallop.   Pulmonary:      Effort: Pulmonary effort is normal. No respiratory distress.      Breath sounds: Normal breath sounds. No stridor. No wheezing, rhonchi or rales.   Abdominal:      General: Abdomen is protuberant. There is no distension.      Palpations: Abdomen is soft.      Tenderness: There is abdominal tenderness. There is no guarding or rebound.      Hernia: No hernia is present.      Comments: Right lower abdomen moderate tenderness palpation appreciated   Musculoskeletal:      Cervical back: Neck supple. No tenderness.   Skin:     General: Skin is dry.      Findings: No lesion or rash.   Neurological:      General: No focal deficit present.      Mental Status: He is oriented to person, place, and time.   Psychiatric:         Mood and Affect: Mood normal.         Behavior: Behavior normal.       Laboratory:  Recent Labs     02/27/23 2050   WBC 14.0*   RBC 4.95   HEMOGLOBIN 16.1   HEMATOCRIT 46.1   MCV 93.1   MCH 32.5*   MCHC 34.9   RDW 13.0   PLATELETCT 343   MPV 10.2     Recent Labs     02/27/23 2050   SODIUM 140   POTASSIUM 4.2   CHLORIDE 100   CO2 23   GLUCOSE 159*   BUN 27*   CREATININE 1.8*   CALCIUM 9.8     Recent Labs     02/27/23 2050   ALTSGPT 31   ASTSGOT 26   ALKPHOSPHAT 77   TBILIRUBIN 0.6   LIPASE 52*   GLUCOSE 159*         No results for input(s): NTPROBNP in the last 72 hours.      No results for input(s): TROPONINT in the last 72 hours.    Imaging:  No orders to display       no X-Ray or EKG requiring interpretation    Assessment/Plan:  Yonny Wallace is a 63 y.o. male with a history of  recurrent small bowel obstructions with 3 in the past year and the most recent in September 2022 quiring multiple abdominal surgeries, large ventral hernia, atrial fibrillation on Pradaxa, HFmrEF 40-45% May 2022, TIA, CVA, HTN, MI, Sleep apnea, NIDDM2 A1c 6.5% May 2022who presented 2/28/2023 as a transfer from [facility] recurrent small bowel obstruction or require surgical evaluation.      Problem Representation: I anticipate this patient will require at least two midnights for appropriate medical management, necessitating inpatient admission because recurrent small bowel obstruction may require surgery.    Patient will need a Med/Surg bed on SURGICAL service .  The need is secondary to small bowel obstruction requiring telemetry and possible surgery..    * SBO (small bowel obstruction) ([facility])  Assessment & Plan  - We will admit to the surgical unit   -N.p.o.  -IV fluids but gentle at 100 mL an hour for 8 hours given has a history of heart failure with last EF of 40 to 45% in May 2022 on echocardiogram  -Antiemetics and pain control  -NG tube  -Primary team to consult general surgery in the morning    Ventral hernia with obstruction and without gangrene- (present on admission)  Assessment & Plan  Demonstrated on CT scan at [facility]    Type 2 diabetes mellitus, without long-term current use of insulin ([facility])  Assessment & Plan  A1c 6.5 in May 2022  -Insulin sliding scale hypoglycemia protocol and Accu-Cheks    Obesity, morbid, BMI 40.0-49.9 ([facility])  Assessment & Plan  Encourage weight loss.      (HFpEF) heart failure with preserved ejection fraction ([facility])  Assessment & Plan  Hold guideline directed medical therapy at this time.  Appears euvolemic.    Hyperglycemia  Assessment & Plan  Does have a history of non-insulin-dependent diabetes type 2 with A1c of 6.5% May 2022.  Suspect reactive though.  -Continue to monitor  -    SUHAS (acute kidney injury) ([facility])  Assessment & Plan  Back  secondary to prerenal azotemia from small bowel obstruction and fluid losses  -Repeat renal function and if still persist with nephrotoxins and renally dose medications and monitor input and output    Leukocytosis  Assessment & Plan  May be reactive with no infection suspected at this time.   Not febrile does not appear septic so no indication for cultures.  -We will get a CBC now given WBC 14 at [facility]    Elevated lactic acid level- (present on admission)  Assessment & Plan  Elevated at [facility] 4.4  -Will repeat lactic acid    Restless leg syndrome- (present on admission)  Assessment & Plan  Hold ropinirole at this time    Depression- (present on admission)  Assessment & Plan  Hold fluoxetine at this time    AF (atrial fibrillation) ([facility])- (present on admission)  Assessment & Plan  CHADSVASC 7.  Last took Pradaxa yesterday.  - hold Pradaxa and metoprolol  -keep Mg >2, K>4    Chronic back pain- (present on admission)  Assessment & Plan  Hold current pain medications at this time and will treat with IV medication    HEATHER (obstructive sleep apnea)- (present on admission)  Assessment & Plan  Continue CPAP during hospitalization    HTN (hypertension)- (present on admission)  Assessment & Plan  Hold antihypertensives given possible surgery        VTE prophylaxis: SCDs/TEDs

## 2023-02-28 NOTE — ASSESSMENT & PLAN NOTE
2/28/23 exploratory laparotomy with lysis of adhesions to prior mesh hernia repair. Exam notable for intermittent tachycardia (HR ), high BP (139/84 - 189/119), well-healing vertical laparotomy incision without any drainage evident. Bowel sounds are normal and he passed 2 large bowel movements overnight.   - On anti-emetics, home pain meds.  - Ceftriaxone 2g daily (2/28 - 3/5/2023), IV Metronidazole 500mg q8hrs (2/28 - 3/5/2023) discontinued yesterday.   - Small bowel follow-through (3/1/2023) showed no evidence of obstruction.  - NGT removed  - Advanced diet to full liquid

## 2023-02-28 NOTE — ASSESSMENT & PLAN NOTE
Prerenal azotemia likely secondary to small bowel obstruction and fluid losses.  - Labs (3/1/2023) now show improving SUHAS (BUN 30, SCr 1.88->1.31; BL 1.2), likely prerenal etiology. No longer requiring fluids.

## 2023-02-28 NOTE — THERAPY
Occupational Therapy Contact Note    Patient Name: Yonny Wallace  Age:  63 y.o., Sex:  male  Medical Record #: [id number]  Today's Date: 2/28/2023    HOLD OT eval pending sx, will follow up as able/appropriate for full evaluation.    Cinthia Brink OTR/L

## 2023-02-28 NOTE — OP REPORT
DATE OF OPERATION:  2/28/2023    PREOPERATIVE DIAGNOSIS:  Partial mechanical small bowel obstruction.  Recurrent ventral incisional hernia with loss of abdominal domain.    POSTOPERATIVE DIAGNOSIS: Same.    PROCEDURE PERFORMED: Exploratory laparotomy and lysis of adhesions.    SURGEON:    Thomas Can M.D.    ASSISTANT:    PHIL Hudson and Vinicius Becker M.D.    ANESTHESIOLOGIST:  Williams Santizo M.D.    ANESTHESIA:   General endotracheal anesthesia.    ASA CLASSIFICATION:  III.    INDICATIONS: The patient is a 63 year-old man with a chronic, recurrent ventral incisional hernia with obstructive symptoms. He is taken to the operating room for exploration.    The complexity of the surgical procedure necessitated additional skilled operative assistance from another surgeon. The assistant was present for a substantial portion of the operation and provided assistance to the operating physician throughout the critical aspects of the procedure. The surgical assistant performed the following: provided assistance with optimal surgical exposure of the operative field, provided high complexity, subspecialty decision making input, and performed the skin closure and dressing application.    FINDINGS: Massive lower abdominal hernia with loss of abdominal domain.  Extensive adhesions to the multiple prior mesh hernia repairs in the anterior abdominal wall.  Extensive interloop small bowel to small bowel adhesions.  No evidence for acute ischemia or perforation.     WOUND CLASSIFICATION:  Class I, Clean.    SPECIMEN:     None.    ESTIMATED BLOOD LOSS:  40 mL.    PROCEDURE: Following informed consent consent, the patient was properly identified, taken to the operating room and placed in supine position where a general endotracheal anesthesia was administered. Intravenous antibiotics were administered by the anesthesiologist in correct time interval. The patient arrived with a previously placed Delacruz catheter.  Sequential compression devices were employed. A safety retension strap was secured. Active patient warming devices were utilized. The operative site was widely prepped and draped into a sterile field.  A timeout was conducted with the full attention and participation of all operating room personnel.      A midline incision was made through his previous scar.  The peritoneal cavity was entered sharply then opened to the full extent of the incision.  Extensive lysis of adhesions was carried out in a circumferential manner and attempted delineate the native fascia.  After extensive dissection it became evident that further surgical conduct risk enterotomy and there was no obvious solution to his loss of abdominal domain.  Additional dissection was halted at this point and the abdomen was closed.      The fascia was approximated with with a pair of running #1 VICRYL® Plus Antibacterial sutures. The skin was approximated with interrupted staples.     The patient tolerated the procedure well, and there were no apparent complications. All sponge, needle, and instrument counts were correct on 2 separate occasions. The patient was was awakened, extubated, and transferred to  to the post anesthesia care unit (PACU) in satisfactory condition.       ____________________________________     Thomas Can M.D.    DD: 2/28/2023  12:49 PM

## 2023-02-28 NOTE — DIETARY
NUTRITION SERVICES: BMI - Pt with BMI >40 (=Body mass index is 45.55 kg/m².), Class III obesity. Weight loss counseling not appropriate in acute care setting. RECOMMEND - If appropriate at DC please refer to outpatient nutrition services for weight management.     RD available PRN

## 2023-02-28 NOTE — PROGRESS NOTES
Pt arrives back from PACU with LAPAROTOMY, EXPLORATORY (Abdomen) LYSIS, ADHESIONS (Abdomen). Pt is alert and oriented x4, 3L nc, NG to low continuous suction. Island dressing scant sanguinous drainage.

## 2023-02-28 NOTE — ANESTHESIA POSTPROCEDURE EVALUATION
Patient: Yonny Wallace    Procedure Summary     Date: 02/28/23 Room / Location: [place] [address] / SURGERY [facility]    Anesthesia Start: 1102 Anesthesia Stop: 1244    Procedures:       LAPAROTOMY, EXPLORATORY (Abdomen)      LYSIS, ADHESIONS (Abdomen) Diagnosis: (recurrent small bowel obstruction associated with massive ventral hernia and loss of abdominal domain)    Surgeons: Thomas Can M.D. Responsible Provider: Williams Santizo M.D.    Anesthesia Type: general ASA Status: 3          Final Anesthesia Type: general  Last vitals  BP   Blood Pressure : (!) 158/70    Temp   36.1 °C (97 °F)    Pulse   94   Resp   17    SpO2   97 %      Anesthesia Post Evaluation    Patient location during evaluation: PACU  Patient participation: complete - patient participated  Level of consciousness: awake and alert  Pain score: 4    Airway patency: patent  Anesthetic complications: no  Cardiovascular status: hemodynamically stable  Respiratory status: acceptable  Hydration status: euvolemic    PONV: none          There were no known notable events for this encounter.     Nurse Pain Score: 0 (NPRS)

## 2023-02-28 NOTE — PROGRESS NOTES
PREOPERATIVE NOTE: Morbidly obese gentleman with multiple medical comorbidities transferred for definitive evaluation and management of recurrent small bowel obstruction associated with massive ventral hernia and loss of abdominal domain.  Persistent pain and gap acidosis despite nasogastric decompression and judicious volume resuscitation.  I have seen and examined the patient today.  Critical physical examination findings, laboratory indices, and radiographic studies reviewed.  I recommend reversal of his anticoagulation followed by surgical exploration, lysis of adhesions, and possible bowel resection. Colonic involvement possible and colostomy creation my be necessary if non-viable colon encountered.  His body habitus, chronicity of his hernia, and profound loss of abdominal domain preclude definitive hernia repair at this operative setting.    The operative strategy was explained and reviewed. Alternatives discussed. Potential complications including, but not limited to, infection, bleeding, damage to adjacent structures, and anesthetic complications were discussed. Questions were elicited and answered to the patient's satisfaction.  Operative consent signed.        ____________________________________     Thomas Can M.D.    DD: 2/28/2023  7:55 AM

## 2023-02-28 NOTE — ASSESSMENT & PLAN NOTE
May be reactive with no infection suspected at this time.   Not febrile does not appear septic so no indication for cultures.  - Resolved.

## 2023-02-28 NOTE — CARE PLAN
The patient is Watcher - Medium risk of patient condition declining or worsening    Shift Goals  Clinical Goals: NG tube placement, pain control, NPO  Patient Goals: rest and pain control      Patient is not progressing towards the following goals:      Problem: Knowledge Deficit - Standard  Goal: Patient and family/care givers will demonstrate understanding of plan of care, disease process/condition, diagnostic tests and medications  Outcome: Not Progressing     Problem: Pain - Standard  Goal: Alleviation of pain or a reduction in pain to the patient’s comfort goal  Outcome: Not Progressing  Note: Pt rates pain 8/10

## 2023-02-28 NOTE — PROGRESS NOTES
TRIAGE OFFICER ADMISSION ACCEPTANCE NOTE:     - I spoke and discussed the case with the ER physician, Dr. Tate  - This is a past medical history of repeated SBO's, large ventral hernia who comes into the hospital for abdominal pain.  CT scan found evidence of small bowel obstruction.  White count is elevated. The gas is still pending.  Outlying facility does not have any beds.  - Please call admitting physician for full admission orders, and cross-coverage issues on patient's arrival to the unit.

## 2023-02-28 NOTE — ANESTHESIA PREPROCEDURE EVALUATION
Case: [id number] Date/Time: 02/28/23 1045    Procedures:       LAPAROTOMY, EXPLORATORY      LYSIS, ADHESIONS    Location: [facility] OR 10 / SURGERY [facility]    Surgeons: Thomas Can M.D.          Relevant Problems   ANESTHESIA   (positive) HEATHER (obstructive sleep apnea)      PULMONARY   (positive) Intermittent asthma without complication      NEURO   (positive) CVA (cerebral vascular accident) (HCC)   (positive) TIA (transient ischemic attack)      CARDIAC   (positive) AF (atrial fibrillation) (HCC)   (positive) Coronary artery disease involving native coronary artery of native heart without angina pectoris   (positive) HTN (hypertension)   (positive) Systolic congestive heart failure (HCC)         (positive) SUHAS (acute kidney injury) (HCC)   (positive) Hepatic steatosis      ENDO   (positive) Type 2 diabetes mellitus, without long-term current use of insulin (HCC)      Other   (positive) SBO (small bowel obstruction) (HCC)       Physical Exam    Airway   Mallampati: III  TM distance: >3 FB  Neck ROM: full       Cardiovascular - normal exam  Rhythm: regular  Rate: normal  (-) murmur     Dental - normal exam           Pulmonary - normal exam  Breath sounds clear to auscultation     Abdominal    Neurological - normal exam                 Anesthesia Plan    ASA 3   ASA physical status 3 criteria: morbid obesity - BMI greater than or equal to 40, CVA or TIA - history (> 3 months), CAD/stents (> 3 months) and moderate reduction of ejection fraction    Plan - general       Airway plan will be ETT          Induction: intravenous    Postoperative Plan: Postoperative administration of opioids is intended.    Pertinent diagnostic labs and testing reviewed    Informed Consent:    Anesthetic plan and risks discussed with patient.    Use of blood products discussed with: patient whom consented to blood products.

## 2023-02-28 NOTE — ASSESSMENT & PLAN NOTE
Does have a history of non-insulin-dependent diabetes type 2 with A1c of 6.5% May 2022.  Suspect reactive though.  - Improving; will continue to monitor.

## 2023-02-28 NOTE — ANESTHESIA PROCEDURE NOTES
Arterial Line  Performed by: Williams Santizo M.D.  Authorized by: Williams Santizo M.D.     Start Time:  2/28/2023 11:12 AM  End Time:  2/28/2023 11:14 AM  Localization: ultrasound guidance  Image captured, interpreted and electronically stored.  Patient Location:  OR  Indication: continuous blood pressure monitoring        Catheter Size:  20 G  Seldinger Technique?: Yes    Laterality:  Left  Site:  Radial artery  Line Secured:  Antimicrobial disc, tape and transparent dressing  Events: patient tolerated procedure well with no complications     1 attempt, wire and catheter threaded easily, wire removed, no apparent complications.

## 2023-02-28 NOTE — ASSESSMENT & PLAN NOTE
Elevated BP (161/113 - 190/113) over last 24 hours, likely exacerbated by post-surgical pain.   - Home antihypertensives and pain medications resumed.

## 2023-02-28 NOTE — ANESTHESIA TIME REPORT
Anesthesia Start and Stop Event Times     Date Time Event    2/28/2023 1045 Ready for Procedure     1102 Anesthesia Start     1244 Anesthesia Stop        Responsible Staff  02/28/23    Name Role Begin End    Williams Santizo M.D. Anesth 1102 1244        Overtime Reason:  no overtime (within assigned shift)    Comments:

## 2023-03-01 ENCOUNTER — APPOINTMENT (OUTPATIENT)
Dept: RADIOLOGY | Facility: MEDICAL CENTER | Age: 63
DRG: 327 | End: 2023-03-01
Attending: SURGERY
Payer: MEDICARE

## 2023-03-01 LAB
ALBUMIN SERPL BCP-MCNC: 4 G/DL (ref 3.2–4.9)
ALBUMIN/GLOB SERPL: 1.3 G/DL
ALP SERPL-CCNC: 49 U/L (ref 30–99)
ALT SERPL-CCNC: 16 U/L (ref 2–50)
ANION GAP SERPL CALC-SCNC: 11 MMOL/L (ref 7–16)
AST SERPL-CCNC: 14 U/L (ref 12–45)
BASOPHILS # BLD AUTO: 0.4 % (ref 0–1.8)
BASOPHILS # BLD: 0.04 K/UL (ref 0–0.12)
BILIRUB SERPL-MCNC: 1.2 MG/DL (ref 0.1–1.5)
BUN SERPL-MCNC: 30 MG/DL (ref 8–22)
CALCIUM ALBUM COR SERPL-MCNC: 9.3 MG/DL (ref 8.5–10.5)
CALCIUM SERPL-MCNC: 9.3 MG/DL (ref 8.5–10.5)
CHLORIDE SERPL-SCNC: 102 MMOL/L (ref 96–112)
CO2 SERPL-SCNC: 28 MMOL/L (ref 20–33)
CREAT SERPL-MCNC: 1.31 MG/DL (ref 0.5–1.4)
EOSINOPHIL # BLD AUTO: 0.01 K/UL (ref 0–0.51)
EOSINOPHIL NFR BLD: 0.1 % (ref 0–6.9)
ERYTHROCYTE [DISTWIDTH] IN BLOOD BY AUTOMATED COUNT: 49.8 FL (ref 35.9–50)
GFR SERPLBLD CREATININE-BSD FMLA CKD-EPI: 60 ML/MIN/1.73 M 2
GLOBULIN SER CALC-MCNC: 3 G/DL (ref 1.9–3.5)
GLUCOSE BLD STRIP.AUTO-MCNC: 122 MG/DL (ref 65–99)
GLUCOSE BLD STRIP.AUTO-MCNC: 141 MG/DL (ref 65–99)
GLUCOSE BLD STRIP.AUTO-MCNC: 145 MG/DL (ref 65–99)
GLUCOSE BLD STRIP.AUTO-MCNC: 148 MG/DL (ref 65–99)
GLUCOSE SERPL-MCNC: 171 MG/DL (ref 65–99)
HCT VFR BLD AUTO: 45 % (ref 42–52)
HGB BLD-MCNC: 14.7 G/DL (ref 14–18)
IMM GRANULOCYTES # BLD AUTO: 0.03 K/UL (ref 0–0.11)
IMM GRANULOCYTES NFR BLD AUTO: 0.3 % (ref 0–0.9)
LYMPHOCYTES # BLD AUTO: 1.53 K/UL (ref 1–4.8)
LYMPHOCYTES NFR BLD: 14.5 % (ref 22–41)
MCH RBC QN AUTO: 32 PG (ref 27–33)
MCHC RBC AUTO-ENTMCNC: 32.7 G/DL (ref 33.7–35.3)
MCV RBC AUTO: 97.8 FL (ref 81.4–97.8)
MONOCYTES # BLD AUTO: 1.81 K/UL (ref 0–0.85)
MONOCYTES NFR BLD AUTO: 17.2 % (ref 0–13.4)
NEUTROPHILS # BLD AUTO: 7.12 K/UL (ref 1.82–7.42)
NEUTROPHILS NFR BLD: 67.5 % (ref 44–72)
NRBC # BLD AUTO: 0.02 K/UL
NRBC BLD-RTO: 0.2 /100 WBC
PLATELET # BLD AUTO: 314 K/UL (ref 164–446)
PMV BLD AUTO: 10.9 FL (ref 9–12.9)
POTASSIUM SERPL-SCNC: 4.2 MMOL/L (ref 3.6–5.5)
PROT SERPL-MCNC: 7 G/DL (ref 6–8.2)
RBC # BLD AUTO: 4.6 M/UL (ref 4.7–6.1)
SODIUM SERPL-SCNC: 141 MMOL/L (ref 135–145)
WBC # BLD AUTO: 10.5 K/UL (ref 4.8–10.8)

## 2023-03-01 PROCEDURE — 700101 HCHG RX REV CODE 250: Performed by: STUDENT IN AN ORGANIZED HEALTH CARE EDUCATION/TRAINING PROGRAM

## 2023-03-01 PROCEDURE — 82962 GLUCOSE BLOOD TEST: CPT

## 2023-03-01 PROCEDURE — 99233 SBSQ HOSP IP/OBS HIGH 50: CPT | Mod: GC | Performed by: STUDENT IN AN ORGANIZED HEALTH CARE EDUCATION/TRAINING PROGRAM

## 2023-03-01 PROCEDURE — 700102 HCHG RX REV CODE 250 W/ 637 OVERRIDE(OP): Performed by: STUDENT IN AN ORGANIZED HEALTH CARE EDUCATION/TRAINING PROGRAM

## 2023-03-01 PROCEDURE — 99024 POSTOP FOLLOW-UP VISIT: CPT | Performed by: SURGERY

## 2023-03-01 PROCEDURE — A9270 NON-COVERED ITEM OR SERVICE: HCPCS | Performed by: STUDENT IN AN ORGANIZED HEALTH CARE EDUCATION/TRAINING PROGRAM

## 2023-03-01 PROCEDURE — 700102 HCHG RX REV CODE 250 W/ 637 OVERRIDE(OP)

## 2023-03-01 PROCEDURE — 770001 HCHG ROOM/CARE - MED/SURG/GYN PRIV*

## 2023-03-01 PROCEDURE — 97162 PT EVAL MOD COMPLEX 30 MIN: CPT

## 2023-03-01 PROCEDURE — 36415 COLL VENOUS BLD VENIPUNCTURE: CPT

## 2023-03-01 PROCEDURE — 80053 COMPREHEN METABOLIC PANEL: CPT

## 2023-03-01 PROCEDURE — 700111 HCHG RX REV CODE 636 W/ 250 OVERRIDE (IP): Performed by: STUDENT IN AN ORGANIZED HEALTH CARE EDUCATION/TRAINING PROGRAM

## 2023-03-01 PROCEDURE — 85025 COMPLETE CBC W/AUTO DIFF WBC: CPT

## 2023-03-01 PROCEDURE — 700105 HCHG RX REV CODE 258: Performed by: STUDENT IN AN ORGANIZED HEALTH CARE EDUCATION/TRAINING PROGRAM

## 2023-03-01 PROCEDURE — 700117 HCHG RX CONTRAST REV CODE 255: Performed by: SURGERY

## 2023-03-01 PROCEDURE — 700111 HCHG RX REV CODE 636 W/ 250 OVERRIDE (IP): Performed by: GENERAL PRACTICE

## 2023-03-01 PROCEDURE — A9270 NON-COVERED ITEM OR SERVICE: HCPCS

## 2023-03-01 PROCEDURE — 700111 HCHG RX REV CODE 636 W/ 250 OVERRIDE (IP)

## 2023-03-01 PROCEDURE — 97166 OT EVAL MOD COMPLEX 45 MIN: CPT

## 2023-03-01 RX ORDER — HYDROMORPHONE HYDROCHLORIDE 1 MG/ML
0.5 INJECTION, SOLUTION INTRAMUSCULAR; INTRAVENOUS; SUBCUTANEOUS ONCE
Status: COMPLETED | OUTPATIENT
Start: 2023-03-01 | End: 2023-03-01

## 2023-03-01 RX ORDER — TERAZOSIN 1 MG/1
1 CAPSULE ORAL EVERY EVENING
Status: CANCELLED | OUTPATIENT
Start: 2023-03-01

## 2023-03-01 RX ORDER — HYDROMORPHONE HYDROCHLORIDE 1 MG/ML
0.5 INJECTION, SOLUTION INTRAMUSCULAR; INTRAVENOUS; SUBCUTANEOUS EVERY 4 HOURS PRN
Status: DISCONTINUED | OUTPATIENT
Start: 2023-03-01 | End: 2023-03-04 | Stop reason: HOSPADM

## 2023-03-01 RX ORDER — LABETALOL HYDROCHLORIDE 5 MG/ML
10 INJECTION, SOLUTION INTRAVENOUS EVERY 4 HOURS PRN
Status: DISCONTINUED | OUTPATIENT
Start: 2023-03-01 | End: 2023-03-04 | Stop reason: HOSPADM

## 2023-03-01 RX ORDER — OXYCODONE AND ACETAMINOPHEN 10; 325 MG/1; MG/1
1 TABLET ORAL EVERY 4 HOURS PRN
Status: DISCONTINUED | OUTPATIENT
Start: 2023-03-01 | End: 2023-03-02

## 2023-03-01 RX ORDER — GABAPENTIN 300 MG/1
300 CAPSULE ORAL 3 TIMES DAILY
Status: DISCONTINUED | OUTPATIENT
Start: 2023-03-01 | End: 2023-03-02

## 2023-03-01 RX ORDER — ALBUTEROL SULFATE 90 UG/1
2 AEROSOL, METERED RESPIRATORY (INHALATION)
Status: DISCONTINUED | OUTPATIENT
Start: 2023-03-01 | End: 2023-03-04 | Stop reason: HOSPADM

## 2023-03-01 RX ORDER — OXYCODONE HYDROCHLORIDE 5 MG/1
5 TABLET ORAL EVERY 6 HOURS
Status: DISCONTINUED | OUTPATIENT
Start: 2023-03-01 | End: 2023-03-02

## 2023-03-01 RX ORDER — AMLODIPINE BESYLATE 5 MG/1
5 TABLET ORAL DAILY
Status: DISCONTINUED | OUTPATIENT
Start: 2023-03-01 | End: 2023-03-02

## 2023-03-01 RX ORDER — LABETALOL HYDROCHLORIDE 5 MG/ML
10 INJECTION, SOLUTION INTRAVENOUS EVERY 6 HOURS PRN
Status: DISCONTINUED | OUTPATIENT
Start: 2023-03-01 | End: 2023-03-01

## 2023-03-01 RX ORDER — TERAZOSIN 1 MG/1
1 CAPSULE ORAL
Status: DISCONTINUED | OUTPATIENT
Start: 2023-03-01 | End: 2023-03-02

## 2023-03-01 RX ORDER — METOPROLOL TARTRATE 100 MG/1
100 TABLET ORAL 2 TIMES DAILY
Status: DISCONTINUED | OUTPATIENT
Start: 2023-03-01 | End: 2023-03-01

## 2023-03-01 RX ORDER — GAUZE BANDAGE 2" X 2"
100 BANDAGE TOPICAL DAILY
Status: DISCONTINUED | OUTPATIENT
Start: 2023-03-02 | End: 2023-03-02

## 2023-03-01 RX ORDER — TIZANIDINE 4 MG/1
1 TABLET ORAL DAILY
Status: DISCONTINUED | OUTPATIENT
Start: 2023-03-01 | End: 2023-03-02

## 2023-03-01 RX ORDER — METOPROLOL TARTRATE 100 MG/1
100 TABLET ORAL 2 TIMES DAILY
Status: DISCONTINUED | OUTPATIENT
Start: 2023-03-01 | End: 2023-03-02

## 2023-03-01 RX ORDER — TERAZOSIN 2 MG/1
2 CAPSULE ORAL EVERY EVENING
Status: DISCONTINUED | OUTPATIENT
Start: 2023-03-01 | End: 2023-03-02

## 2023-03-01 RX ORDER — AMLODIPINE BESYLATE 5 MG/1
5 TABLET ORAL DAILY
Status: DISCONTINUED | OUTPATIENT
Start: 2023-03-01 | End: 2023-03-01

## 2023-03-01 RX ORDER — OXYCODONE HCL 10 MG/1
10 TABLET, FILM COATED, EXTENDED RELEASE ORAL 2 TIMES DAILY
Status: DISCONTINUED | OUTPATIENT
Start: 2023-03-01 | End: 2023-03-01

## 2023-03-01 RX ORDER — HYDROMORPHONE HYDROCHLORIDE 1 MG/ML
1 INJECTION, SOLUTION INTRAMUSCULAR; INTRAVENOUS; SUBCUTANEOUS ONCE
Status: DISCONTINUED | OUTPATIENT
Start: 2023-03-01 | End: 2023-03-01

## 2023-03-01 RX ORDER — FLUOXETINE HYDROCHLORIDE 20 MG/1
60 CAPSULE ORAL DAILY
Status: DISCONTINUED | OUTPATIENT
Start: 2023-03-01 | End: 2023-03-02

## 2023-03-01 RX ORDER — FLUOXETINE HYDROCHLORIDE 20 MG/1
60 CAPSULE ORAL DAILY
Status: DISCONTINUED | OUTPATIENT
Start: 2023-03-01 | End: 2023-03-01

## 2023-03-01 RX ADMIN — LABETALOL HYDROCHLORIDE 10 MG: 5 INJECTION, SOLUTION INTRAVENOUS at 19:38

## 2023-03-01 RX ADMIN — OXYCODONE AND ACETAMINOPHEN 1 TABLET: 10; 325 TABLET ORAL at 22:25

## 2023-03-01 RX ADMIN — HYDROMORPHONE HYDROCHLORIDE 0.5 MG: 1 INJECTION, SOLUTION INTRAMUSCULAR; INTRAVENOUS; SUBCUTANEOUS at 15:10

## 2023-03-01 RX ADMIN — METOPROLOL 100 MG: 100 TABLET ORAL at 17:50

## 2023-03-01 RX ADMIN — HYDROMORPHONE HYDROCHLORIDE 0.5 MG: 1 INJECTION, SOLUTION INTRAMUSCULAR; INTRAVENOUS; SUBCUTANEOUS at 17:43

## 2023-03-01 RX ADMIN — HYDROMORPHONE HYDROCHLORIDE 0.5 MG: 1 INJECTION, SOLUTION INTRAMUSCULAR; INTRAVENOUS; SUBCUTANEOUS at 10:32

## 2023-03-01 RX ADMIN — HYDROMORPHONE HYDROCHLORIDE 0.5 MG: 1 INJECTION, SOLUTION INTRAMUSCULAR; INTRAVENOUS; SUBCUTANEOUS at 03:21

## 2023-03-01 RX ADMIN — GABAPENTIN 300 MG: 300 CAPSULE ORAL at 17:50

## 2023-03-01 RX ADMIN — IOHEXOL 400 ML: 350 INJECTION, SOLUTION INTRAVENOUS at 14:24

## 2023-03-01 RX ADMIN — HYDROMORPHONE HYDROCHLORIDE 0.5 MG: 1 INJECTION, SOLUTION INTRAMUSCULAR; INTRAVENOUS; SUBCUTANEOUS at 23:57

## 2023-03-01 RX ADMIN — PROCHLORPERAZINE EDISYLATE 5 MG: 5 INJECTION INTRAMUSCULAR; INTRAVENOUS at 19:36

## 2023-03-01 RX ADMIN — METRONIDAZOLE 500 MG: 5 INJECTION, SOLUTION INTRAVENOUS at 05:29

## 2023-03-01 RX ADMIN — PROCHLORPERAZINE EDISYLATE 5 MG: 5 INJECTION INTRAMUSCULAR; INTRAVENOUS at 08:32

## 2023-03-01 RX ADMIN — CEFTRIAXONE SODIUM 2000 MG: 10 INJECTION, POWDER, FOR SOLUTION INTRAVENOUS at 04:24

## 2023-03-01 RX ADMIN — AMLODIPINE BESYLATE 5 MG: 5 TABLET ORAL at 12:32

## 2023-03-01 RX ADMIN — FLUOXETINE 60 MG: 20 CAPSULE ORAL at 13:35

## 2023-03-01 RX ADMIN — METRONIDAZOLE 500 MG: 5 INJECTION, SOLUTION INTRAVENOUS at 13:31

## 2023-03-01 RX ADMIN — TERAZOSIN 1 MG: 1 CAPSULE ORAL at 17:50

## 2023-03-01 RX ADMIN — OXYCODONE HYDROCHLORIDE 5 MG: 5 TABLET ORAL at 17:50

## 2023-03-01 RX ADMIN — HYDROMORPHONE HYDROCHLORIDE 0.5 MG: 1 INJECTION, SOLUTION INTRAMUSCULAR; INTRAVENOUS; SUBCUTANEOUS at 00:10

## 2023-03-01 RX ADMIN — LABETALOL HYDROCHLORIDE 10 MG: 5 INJECTION, SOLUTION INTRAVENOUS at 08:35

## 2023-03-01 RX ADMIN — TIZANIDINE 1 MG: 4 TABLET ORAL at 17:50

## 2023-03-01 RX ADMIN — SODIUM CHLORIDE, POTASSIUM CHLORIDE, SODIUM LACTATE AND CALCIUM CHLORIDE: 600; 310; 30; 20 INJECTION, SOLUTION INTRAVENOUS at 08:42

## 2023-03-01 RX ADMIN — HYDROMORPHONE HYDROCHLORIDE 0.5 MG: 1 INJECTION, SOLUTION INTRAMUSCULAR; INTRAVENOUS; SUBCUTANEOUS at 05:50

## 2023-03-01 RX ADMIN — HYDROMORPHONE HYDROCHLORIDE 0.5 MG: 1 INJECTION, SOLUTION INTRAMUSCULAR; INTRAVENOUS; SUBCUTANEOUS at 19:34

## 2023-03-01 RX ADMIN — HYDROMORPHONE HYDROCHLORIDE 0.5 MG: 1 INJECTION, SOLUTION INTRAMUSCULAR; INTRAVENOUS; SUBCUTANEOUS at 08:32

## 2023-03-01 ASSESSMENT — COGNITIVE AND FUNCTIONAL STATUS - GENERAL
CLIMB 3 TO 5 STEPS WITH RAILING: A LITTLE
TOILETING: A LOT
HELP NEEDED FOR BATHING: A LOT
STANDING UP FROM CHAIR USING ARMS: A LITTLE
TURNING FROM BACK TO SIDE WHILE IN FLAT BAD: A LOT
MOBILITY SCORE: 15
WALKING IN HOSPITAL ROOM: A LITTLE
SUGGESTED CMS G CODE MODIFIER DAILY ACTIVITY: CK
DAILY ACTIVITIY SCORE: 16
DRESSING REGULAR LOWER BODY CLOTHING: A LOT
MOVING FROM LYING ON BACK TO SITTING ON SIDE OF FLAT BED: A LITTLE
PERSONAL GROOMING: A LITTLE
SUGGESTED CMS G CODE MODIFIER MOBILITY: CK
MOVING TO AND FROM BED TO CHAIR: UNABLE
DRESSING REGULAR UPPER BODY CLOTHING: A LITTLE

## 2023-03-01 ASSESSMENT — PAIN DESCRIPTION - PAIN TYPE
TYPE: ACUTE PAIN;SURGICAL PAIN
TYPE: SURGICAL PAIN;ACUTE PAIN
TYPE: ACUTE PAIN;SURGICAL PAIN
TYPE: ACUTE PAIN;SURGICAL PAIN

## 2023-03-01 ASSESSMENT — ACTIVITIES OF DAILY LIVING (ADL): TOILETING: INDEPENDENT

## 2023-03-01 ASSESSMENT — GAIT ASSESSMENTS
DEVIATION: BRADYKINETIC
DISTANCE (FEET): 3
GAIT LEVEL OF ASSIST: CONTACT GUARD ASSIST
ASSISTIVE DEVICE: FRONT WHEEL WALKER

## 2023-03-01 ASSESSMENT — FIBROSIS 4 INDEX: FIB4 SCORE: 0.72

## 2023-03-01 NOTE — FACE TO FACE
Face to Face Supporting Documentation - Home Health    The encounter with this patient was in whole or in part the primary reason for home health admission.    Date of encounter:   Patient:                    MRN:                       YOB: 2025  Yonny Wallace  [id number]  1960     Home health to see patient for:  Home health aide, Physical Therapy evaluation and treatment, and Occupational therapy evaluation and treatment    Skilled need for:  Surgical Aftercare POD#1 exploratory laparotomy and lysis of adhesions from prior mesh hernia repair in setting of high-grade mechanical small bowel obstruction    Skilled nursing interventions to include:  Wound Care    Homebound status evidenced by:  Need the aid of supportive devices such as crutches, canes, wheelchairs or walkers. Leaving home requires a considerable and taxing effort. There is a normal inability to leave the home.    Community Physician to provide follow up care: Carter Murphy M.D.     Optional Interventions? No      I certify the face to face encounter for this home health care referral meets the CMS requirements and the encounter/clinical assessment with the patient was, in whole, or in part, for the medical condition(s) listed above, which is the primary reason for home health care. Based on my clinical findings: the service(s) are medically necessary, support the need for home health care, and the homebound criteria are met.  I certify that this patient has had a face to face encounter by myself.  Roscoe Hagan D.O. - NPI: [id number]

## 2023-03-01 NOTE — PROGRESS NOTES
"    DATE: 3/1/2023    Post Operative Day  1  Exploratory laparotomy and lysis of adhesions.    INTERVAL EVENTS:  Persistent obstructive symptoms.    PHYSICAL EXAMINATION:  Vital Signs: BP (!) 166/102   Pulse (!) 102   Temp 36.5 °C (97.7 °F) (Temporal)   Resp 18   Ht 1.727 m (5' 8\")   Wt (!) 137 kg (302 lb)   SpO2 92%     Obese abdomen.  Diffusely tender at surgical incision.  Dressing is clean and dry.    Massive recurrent ventral incisional hernia with loss of domain.      ASSESSMENT AND PLAN:   Extremely challenging hernia with limited surgical options.  Recommend water-soluble contrast imaging to prove high-grade mechanical obstruction.  If this is the case he will require extensive surgical lysis of adhesions in the next 24 to 48 hours.  We will follow closely.          "

## 2023-03-01 NOTE — ASSESSMENT & PLAN NOTE
2/28 Partial mechanical small bowel obstruction.  Recurrent ventral incisional hernia with loss of abdominal domain.  Exploratory laparotomy and lysis of adhesions. Surgery halted due to recognition that there was no solution for loss of abdominal domain.   3/2 Clamp NGT. Advance diet to clear liquid diet with slow advancement.  3/3 Tolerating diet. Multiple BM.   - Remove NGT and advance diet.  ACS Blue Service.

## 2023-03-01 NOTE — PROGRESS NOTES
"Pt admitted early AM 2/28/2023 w/ SBO.     \"Yonny Wallace is a 63 y.o. 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  She has had no flatus since 1400 yesterday and allowed a large meal around the same time yesterday.  In the past the patient has been recommended to go to [facility] to see a specialist as he needs an extensive abdominal wall reconstruction  Patient reports that he saw the surgeon at [facility] in November 2022 was recommended to lose 30 to 40 pounds prior to consideration for surgery; however, the patient reports he has gained about 20 pounds since then.  Reports he pulled his NG tube out in route to [facility] and has been vomiting without blood.  Reports his abdominal pain is about the same as at [place] at 8 out of 10.  Denies fever chills or any other symptoms.  Patient reports he last took Pradaxa at 3 PM on 2/27/2023.     [facility] patient was afebrile normotensive and vital signs within normal limits.  Male with softly distended with tenderness in the right abdomen with decreased bowel sounds.  CAT scan showed bowel obstruction but no suspicion for bowel ischemia with NG tube placed and given pain medication.  Transfer due to no beds available at [facility].     WBC of 14, anion gap of 21, glucose 159, creatinine 1.8 (baseline 1.2), lipase 52, lactate 4.4     CT abdomen pelvis shows a large ventral abdominal hernia contains both tightly collapsed small bowel loops and fluid distended small bowel loops with " "a transition point in the hernia sac.  This is consistent with a high-grade small bowel obstruction without perforation or abscess formation related to the hernia.  No mesenteric edema suspicion for bowel ischemia.     Given 2 L of IV fluids at outside facility prior to transfer.     Early a.m., patient had rising lactate of 7 up from 4.4, on-call surgery took him emergently to the OR after reversal of Pradaxa was completed, he underwent explorative laparotomy with extensive lysis of adhesions, however overt point of obstruction was not visualized and the procedure had to be terminated early due to assessment that further surgical conduct would risk enterotomy, the dissection was halted and abdomen was closed.\"--From Dr. Lavell cameron H&P    INTERVAL HISTORY:    I saw this patient at approximately 4:30 PM postop.    Vital signs remarkable for mild sinus tachycardia in the 100s as well as mild hypertension BP systolic in the 160s    Patient reported no abdominal pain, however has not yet passed gas or bowel movements    -Physical exam: AOx4, cardiopulmonary exam unremarkable, large obese abdomen with large ventral hernia on the right mid abdomen, vertical laparotomy incision with small/minimal drainage, NG tube in place    #Small bowel obstruction  #Recurrent small bowel obstructions requiring NG tube decompressions in the past  #History of prior abdominal surgeries  #Large ventral hernia  #Lactic acidosis--downtrended, peaked at 7, trend  #Leukocytosis, due to sepsis versus SIRS from inflammatory reaction from SBO  #SIRS 2/4 on ceftriaxone and Flagyl empirically for intra-abdominal source  #Acute kidney injury, likely prerenal  -Surgical management, appreciate surgery recommendations  -Empiric ceftriaxone and Flagyl for now  -IV fluid resuscitation for acute kidney injury  -Strict I's/O, avoid nephrotoxins  -Continue NG tube decompression  -N.p.o. okay for ice chips    "

## 2023-03-01 NOTE — THERAPY
Physical Therapy   Initial Evaluation     Patient Name: Yonny Wallace  Age:  63 y.o., Sex:  male  Medical Record #: [id number]  Today's Date: 3/1/2023     Precautions  Precautions: Fall Risk  Comments: NG to suction    Assessment  Patient is 63 y.o. male with extensive PMH (see H&P for details) admitted with SBO, underwent exploratory laparotomy and lysis of adhesions on 2/28/2023. Pt presents to physical therapy with mild impairments in functional transfers, balance, gait and activity tolerance related to abdominal pain. Only tolerated ambulation x3 ft to return to EOB from chair. Pt will benefit from acute PT interventions to address impairments noted below and optimize functional independence to allow pt to return home alone.     Plan    Physical Therapy Initial Treatment Plan   Treatment Plan : Bed Mobility, Gait Training, Self Care / Home Evaluation, Stair Training, Therapeutic Activities, Therapeutic Exercise  Treatment Frequency: 4 Times per Week  Duration: Until Therapy Goals Met       Discharge Recommendations:  TBD, currently recommend placement. However, anticipate pt will be able to progress to level capable of DC home when pain is controlled and HH following.       03/01/23 1038   Precautions   Precautions Fall Risk   Comments NG to suction   Vitals   O2 Delivery Device Nasal Cannula   Pain 0 - 10 Group   Therapist Pain Assessment During Activity;Nurse Notified  (abdominal pain, not rated)   Prior Living Situation   Prior Services None   Housing / Facility 1 Story Apartment / Condo   Steps Into Home 1  (small entry step)   Steps In Home 0   Bathroom Set up Walk In Shower   Equipment Owned Single Point Cane   Lives with - Patient's Self Care Capacity Alone and Able to Care For Self   Comments From [place]   Prior Level of Functional Mobility   Bed Mobility Independent   Transfer Status Independent   Ambulation Independent   Ambulation Distance   (community)   Assistive Devices Used Single Point Cane    Stairs Independent   Cognition    Cognition / Consciousness WDL   Level of Consciousness Alert   Comments pleasant and cooperative with PT   Active ROM Lower Body    Active ROM Lower Body  WDL   Strength Lower Body   Lower Body Strength  WDL   Comments adequate for functional mobility, limited by pain   Balance Assessment   Sitting Balance (Static) Fair +   Sitting Balance (Dynamic) Fair   Standing Balance (Static) Fair   Standing Balance (Dynamic) Fair   Weight Shift Sitting Fair   Weight Shift Standing Fair   Comments w/ FWW   Bed Mobility    Supine to Sit   (in chair upon PT arrival)   Sit to Supine Moderate Assist  (to B LE)   Scooting Standby Assist  (seated)   Gait Analysis   Gait Level Of Assist Contact Guard Assist   Assistive Device Front Wheel Walker   Distance (Feet) 3   # of Times Distance was Traveled 1   Deviation Bradykinetic   # of Stairs Climbed 0   Weight Bearing Status no restrictions   Comments slow and guarded due to pain   Functional Mobility   Sit to Stand Contact Guard Assist   Bed, Chair, Wheelchair Transfer Contact Guard Assist   Transfer Method Stand Step   How much difficulty does the patient currently have...   Turning over in bed (including adjusting bedclothes, sheets and blankets)? 2   Sitting down on and standing up from a chair with arms (e.g., wheelchair, bedside commode, etc.) 3   Moving from lying on back to sitting on the side of the bed? 1   How much help from another person does the patient currently need...   Moving to and from a bed to a chair (including a wheelchair)? 3   Need to walk in a hospital room? 3   Climbing 3-5 steps with a railing? 3   6 clicks Mobility Score 15   Activity Tolerance   Sitting in Chair in chair prior to PT session, > 30 min   Sitting Edge of Bed 2 min   Standing 2 min   Patient / Family Goals    Patient / Family Goal #1 to return home   Short Term Goals    Short Term Goal # 1 pt will perform supine <> sit with HOB flat, no railing and SPV in 6  visits   Short Term Goal # 2 pt will perform sit <> stand with LRAD and SPV to improve mobility independence in 6 visits   Short Term Goal # 3 pt will ambulate > 200 ft with LRAD and SPV to access community in 6 visits   Education Group   Education Provided Role of Physical Therapist   Role of Physical Therapist Patient Response Patient;Acceptance;Explanation;Verbal Demonstration   Physical Therapy Initial Treatment Plan    Treatment Plan  Bed Mobility;Gait Training;Self Care / Home Evaluation;Stair Training;Therapeutic Activities;Therapeutic Exercise   Treatment Frequency 4 Times per Week   Duration Until Therapy Goals Met   Problem List    Problems Pain;Impaired Bed Mobility;Impaired Transfers;Impaired Ambulation;Impaired Balance;Decreased Activity Tolerance   Anticipated Discharge Equipment and Recommendations   Discharge Recommendations   (TBD, currently recommend placement. However, anticipate pt will be able to progress to level capable of DC home when pain is controlled and HH following.)   Interdisciplinary Plan of Care Collaboration   IDT Collaboration with  Nursing;Occupational Therapist   Patient Position at End of Therapy In Bed;Call Light within Reach;Tray Table within Reach   Collaboration Comments aware of PT eval and recs   Session Information   Date / Session Number  3/1- 1 (1/4, 3/7)

## 2023-03-01 NOTE — PROGRESS NOTES
Hospitalist Note    Yonny Wallace is a 63-year-old gentleman, who was admitted by my colleagues Dr. Ramon and Dr. Ivan earlier this morning as a transfer from [facility] for small bowel obstruction.  He had a history of severe obesity BMI 45.5, atrial fibrillation on dabigatran, recurrent small bowel obstructions, and HFpEF.  Despite nasogastric decompression and judicious volume resuscitation, due to increasing lactic acidosis and concern for bowel ischemia, patient was taken emergently to the operating room for expiratory laparotomy with lysis of adhesions, which was completed.  The abdomen dissection was halted and close due to recognition that there was no solution for loss of abdominal domain.    I personally saw and examined the patient at bedside postoperatively.  Patient is currently complaining of abdominal pain currently receiving IV Dilaudid.   He remains hemodynamically stable.  Continues on LR at 83 mL/h.  Continuing on ceftriaxone and metronidazole.

## 2023-03-01 NOTE — DISCHARGE PLANNING
Case Management Discharge Planning    Admission Date: 2/28/2023  GMLOS: 3  ALOS: 0    6-Clicks ADL Score: 20  6-Clicks Mobility Score: 17  PT and/or OT Eval ordered: No  Post-acute Referrals Ordered: No  Post-acute Choice Obtained: No  Has referral(s) been sent to post-acute provider:  No      Anticipated Discharge Dispo: Discharge Disposition: Discharged to home/self care (01)    DME Needed: No    Action(s) Taken: MDR discussion, OR today for ex lap and lysis of adhesions. Not stable for discharge.     Escalations Completed: None    Medically Clear: No    Next Steps: OR procedure.     Barriers to Discharge: Medical clearance and Pending Procedures    Is the patient up for discharge tomorrow: No

## 2023-03-01 NOTE — PROGRESS NOTES
[facility] Internal Medicine Daily Progress Note    Date of Service  3/1/2023    [facility] Team: [facility] Purple Team   Attending: Guilherme Pritchett M.d.  Senior Resident: Dr. Alondra Gonzales MD  Intern:  Dr. Roscoe Hagan DO   Contact Number: [phone]    Chief Complaint  63yoM with small bowel obstruction, POD #1 from exploratory laparotomy with lysis of adhesions to prior mesh hernia repair. Patient has history of recurrent SBO.    Hospital Course  Yonny Wallace is a 63 y.o. male with a history of recurrent small bowel obstructions with 3 in the past year and the most recent in September 2022 quiring multiple abdominal surgeries, large ventral hernia, atrial fibrillation on Pradaxa, HFmrEF 40-45% May 2022, TIA, CVA, HTN, MI, Sleep apnea, NIDDM2 A1c 6.5% May 2022 who presented 2/28/2023 as a transfer from [facility] who presented to the hospital for abdominal pain with CT scan demonstrating evidence of a small bowel obstruction with elevated white count.  She has had no flatus since 1400 yesterday and allowed a large meal around the same time yesterday.  In the past the patient has been recommended to go to [facility] to see a specialist as he needs an extensive abdominal wall reconstruction    Patient reports that he saw the surgeon at [facility] in November 2022 was recommended to lose 30 to 40 pounds prior to consideration for surgery; however, the patient reports he has gained about 20 pounds since then.  Reports he pulled his NG tube out in route to [facility] and has been vomiting without blood.  Reports his abdominal pain is about the same as at [place] at 8 out of 10.  Denies fever chills or any other symptoms.  Patient reports he last took Pradaxa at 3 PM on 2/27/2023.     WBC of 14, anion gap of 21, glucose 159, creatinine 1.8 (baseline 1.2), lipase 52, lactate 4.4. CT abdomen pelvis shows a large ventral abdominal hernia contains both tightly collapsed small bowel loops and fluid distended small bowel  "loops with a transition point in the hernia sac.  This is consistent with a high-grade small bowel obstruction without perforation or abscess formation related to the hernia.  No mesenteric edema suspicion for bowel ischemia. Given 2 L of IV fluids at outside facility prior to transfer.     Early a.m., patient had rising lactate of 7 up from 4.4, on-call surgery took him emergently to the OR after reversal of Pradaxa was completed, he underwent explorative laparotomy with extensive lysis of adhesions, however overt point of obstruction was not visualized and the procedure had to be terminated early due to assessment that further surgical conduct would risk enterotomy, the dissection was halted and abdomen was closed.\"--From Dr. Lavell cameron H&P    Interval Problem Update  - POD #1 from exploratory laparotomy with lysis of adhesions to prior mesh hernia repair. No dead bowel found. Exam notable for intermittent tachycardia (HR ), elevated BP (161/113 - 190/113), large obese abdomen with large ventral hernia on right mid abdomen, well-healing but moderately tender vertical laparotomy incision with minimal drainage, NG tube in place. Bowel sounds diminished. Denied overnight bowel movement or flatulence, suggestive of post-op ileus.   - On Ceftriaxone 2g daily (2/28 - 3/5/2023), IV Metronidazole 500mg q8hrs. Received IV Dilaudid 0.5mg q2hrs PRN x4 yday, x3 this morning. Labs show improving SUHAS (BUN 30, SCr 1.88->1.31; BL 1.2), likely prerenal etiology, no longer on IVF LR (received 500mL yday morning). Home medications for pain management and anxiety resumed.   - Gen Surg following. Small bowel follow-through study (3/1/2023) with water-soluble contrast pending.     I have discussed this patient's plan of care and discharge plan at IDT rounds today with Case Management, Nursing, Nursing leadership, and other members of the IDT team.    Consultants/Specialty  GI    Code Status  Full " Code    Disposition  Patient is not medically cleared for discharge.   Anticipate discharge to to home with close outpatient follow-up.  I have placed the appropriate orders for post-discharge needs.    Review of Systems  Review of Systems   Constitutional:  Negative for chills and fever.   Respiratory:  Negative for cough and shortness of breath.    Cardiovascular:  Negative for chest pain and palpitations.   Gastrointestinal:  Positive for abdominal pain, nausea and vomiting. Negative for constipation, diarrhea and heartburn.   Neurological:  Negative for dizziness, weakness and headaches.     Home Medications    Medication Sig Taking? Last Dose Authorizing Provider   gabapentin (NEURONTIN) 300 MG Cap Take 1 Capsule by mouth 3 times a day.   Carter Murphy M.D.   MOVANTIK 25 MG Tab TAKE ONE TABLET BY MOUTH OR FEEDING TUBE DAILY  2/27/2023 Carter Murphy M.D.   pravastatin (PRAVACHOL) 40 MG tablet TAKE ONE TABLET BY MOUTH EVERY EVENING  2/27/2023 Carter Murphy M.D.   ROPINIRole (REQUIP) 0.25 MG Tab TAKE 3 TO 7 TABLETS BY MOUTH AT BEDTIME WITH 1 MG TABLET AS DICTATED BY SYMPTOMS  2/27/2023 Carter Murphy M.D.   terazosin (HYTRIN) 1 MG Cap TAKE ONE CAPSULE BY MOUTH EVERY MORNING AND TAKE TWO CAPSULES EVERY NIGHT AT BEDTIME  2/27/2023 Carter Murphy M.D.   amLODIPine (NORVASC) 5 MG Tab Take 1 Tablet by mouth every day.  2/27/2023 Vern Villatoro M.D.   furosemide (LASIX) 20 MG Tab 2 tablets daily in am  2/27/2023 Carter Murphy M.D.   albuterol 108 (90 Base) MCG/ACT Aero Soln inhalation aerosol INHALE TWO PUFFS BY MOUTH EVERY 6 HOURS AS NEEDED FOR SHORTNESS OF BREATH - taking more often lately  2/27/2023 Carter Murphy M.D.   metoprolol tartrate (LOPRESSOR) 100 MG Tab Take 1 Tablet by mouth 2 times a day.  2/27/2023 Amrik Ardon M.D.   PRADAXA 150 MG Cap capsule TAKE ONE CAPSULE BY MOUTH TWICE A DAY  2/27/2023 Carter Murphy M.D.   FLUoxetine (PROZAC) 20 MG Cap TAKE THREE CAPSULES BY MOUTH EVERY MORNING   2/27/2023 Carter Murphy M.D.   hydrocortisone rectal (PERIANAL) 2.5% Cream APPLY TO AFFECTED AREA(S) TWO TIMES A DAY AS DIRECTED  2/27/2023 Carter Murphy M.D.   EPINEPHrine (EPIPEN) 0.3 MG/0.3ML Solution Auto-injector solution for injection Inject 0.3 mL into the shoulder, thigh, or buttocks as needed.   Carter Murphy M.D.   hydrocortisone 2.5 % Cream topical cream 2 times daily as needed  2/27/2023 Carter Murphy M.D.   potassium chloride (MICRO-K) 10 MEQ capsule Take 1 capsule by mouth 2 times a day.  2/27/2023 Carter Murphy M.D.   polyethylene glycol/lytes (MIRALAX) 17 g Pack Take 1 Packet by mouth every day.  2/21/2023 Gilberto Mack M.D.   thiamine (THIAMINE) 100 MG tablet Take 1 tablet by mouth every day.  2/27/2023 Gilberto Mack M.D.   Respiratory Therapy Supplies (CARETOUCH 2 CPAP HOSE ) [facility] [place]   Physician Outpatient   ROPINIRole (REQUIP) 1 MG Tab 1 tab at hs with an increasing amount of the 0.25 mg tablets as directed  2/27/2023 Carter Murphy M.D.   oxyCODONE-acetaminophen (PERCOCET-10)  MG Tab Take 1 Tab by mouth every four hours as needed for Severe Pain.  2/27/2023 Physician Outpatient   tizanidine (ZANAFLEX) 2 MG tablet Take 1 mg by mouth every day.  2/27/2023 Physician Outpatient   oxyCODONE HCl (OXYCONTIN PO) Take 15 mg by mouth 2 Times a Day.  2/27/2023 Physician Outpatient   Blood Glucose Monitoring Suppl SUPPLIES Misc Test strips for Freestyle meter. Use to test blood sugars 2-3 times daily, Diagnosis code E11.9   JOSE Duran.O.   Lancets Misc Lancets for Freestyle meter. Use to test blood sugars 2-3 times daily, Diagnosis code E11.9   To Pickens D.O.   diphenhydrAMINE (BENADRYL) 25 MG TABS Take 50 mg by mouth every 6 hours as needed.   Physician Outpatient        Physical Exam  Temp:  [36.5 °C (97.7 °F)-36.6 °C (97.9 °F)] 36.6 °C (97.9 °F)  Pulse:  [] 118  Resp:  [16-20] 16  BP: (156-190)/() 156/93  SpO2:  [87 %-96 %] 92 %    Vitals and nursing  note reviewed.   Constitutional:       General: He is not in acute distress.     Appearance: He is ill-appearing. He is not toxic-appearing or diaphoretic.   HENT:      Head: Normocephalic and atraumatic.      Mouth/Throat:      Mouth: Mucous membranes are dry.      Pharynx: No oropharyngeal exudate or posterior oropharyngeal erythema.   Eyes:      Conjunctiva/sclera: Conjunctivae normal.   Cardiovascular:      Rate and Rhythm: Normal rate and regular rhythm.      Pulses: Normal pulses.      Heart sounds: Normal heart sounds. No murmur heard.    No friction rub. No gallop.   Pulmonary:      Effort: Pulmonary effort is normal. No respiratory distress.      Breath sounds: Normal breath sounds. No stridor. No wheezing, rhonchi or rales.   Abdominal:      General: Abdomen is protuberant.      Tenderness: There is abdominal tenderness. There is no involuntary guarding or rebound.      Comments: Large ventral hernia on right mid abdomen, well-healing but moderately tender vertical laparotomy incision with minimal drainage, NG tube in place. Bowel sounds diminished.  Musculoskeletal:      Cervical back: Neck supple. No tenderness.   Skin:     General: Skin is dry.      Findings: No lesion or rash.   Neurological:      General: No focal deficit present.      Mental Status: He is oriented to person, place, and time.   Psychiatric:         Mood and Affect: Mood normal.         Behavior: Behavior normal.     Fluids    Intake/Output Summary (Last 24 hours) at 3/1/2023 1907  Last data filed at 3/1/2023 1740  Gross per 24 hour   Intake 180 ml   Output 600 ml   Net -420 ml       Laboratory  Recent Labs     02/27/23 2050 02/28/23  0309 03/01/23  0350   WBC 14.0* 18.1* 10.5   RBC 4.95 5.27 4.60*   HEMOGLOBIN 16.1 16.9 14.7   HEMATOCRIT 46.1 49.7 45.0   MCV 93.1 94.3 97.8   MCH 32.5* 32.1 32.0   MCHC 34.9 34.0 32.7*   RDW 13.0 45.6 49.8   PLATELETCT 343 355 314   MPV 10.2 10.4 10.9     Recent Labs     02/27/23 2050 02/28/23  0309  03/01/23  0350   SODIUM 140 139 141   POTASSIUM 4.2 4.7 4.2   CHLORIDE 100 98 102   CO2 23 20 28   GLUCOSE 159* 214* 171*   BUN 27* 32* 30*   CREATININE 1.8* 1.88* 1.31   CALCIUM 9.8 10.6* 9.3                   Imaging  OUTSIDE IMAGES-DX CHEST   Final Result      OUTSIDE IMAGES-CT ABDOMEN /PELVIS   Final Result      DX-SMALL BOWEL SERIES    (Results Pending)      Scheduled Medications   Medication Dose Frequency    amLODIPine  5 mg DAILY    metoprolol tartrate  100 mg BID    FLUoxetine  60 mg DAILY    gabapentin  300 mg TID    terazosin  1 mg QDAY with Breakfast    terazosin  2 mg Q EVENING    tizanidine  1 mg DAILY    [START ON 3/2/2023] thiamine  100 mg DAILY    oxyCODONE immediate-release  5 mg Q6HRS    Pharmacy   PHARMACY TO DOSE    insulin regular  1-6 Units Q6HRS    cefTRIAXone (ROCEPHIN) IV  2,000 mg Q24HRS    metroNIDAZOLE (FLAGYL) IV  500 mg Q8HRS      Assessment/Plan  Problem Representation:    * SBO (small bowel obstruction) ([facility])  Assessment & Plan  POD #1 from exploratory laparotomy with lysis of adhesions to prior mesh hernia repair. No dead bowel found during ex lap. Exam shows well-healing laparotomy incision with minimal drainage, NG tube in place. Bowel sounds diminished. Moderately tender to palpation at stef-incisional region.  - On anti-emetics, home pain meds.  - On Ceftriaxone 2g daily (2/28 - 3/5/2023), IV Metronidazole 500mg q8hrs (2/28 - 3/5/2023).  - Gen Surg following. Small bowel follow-through study (3/1/2023) with water-soluble contrast pending.       Type 2 diabetes mellitus, without long-term current use of insulin ([facility])  Assessment & Plan  A1c 6.5 in May 2022  -Insulin sliding scale hypoglycemia protocol and Accu-Cheks    Class 3 severe obesity due to excess calories with serious comorbidity and body mass index (BMI) of 45.0 to 49.9 in adult ([facility])  Assessment & Plan  Encourage weight loss.      (HFpEF) heart failure with preserved ejection fraction ([facility])  Assessment & Plan  Not on  ACE-I/ARB. Home Metoprolol, Amlodipine resumed on 3/1/2023. Lasix still on hold.     Hyperglycemia  Assessment & Plan  Does have a history of non-insulin-dependent diabetes type 2 with A1c of 6.5% May 2022.  Suspect reactive though.  - Improving; will continue to monitor.      SUHAS (acute kidney injury) ([facility])  Assessment & Plan  Prerenal azotemia likely secondary to small bowel obstruction and fluid losses.  - Labs (3/1/2023) now show improving SUHAS (BUN 30, SCr 1.88->1.31; BL 1.2), likely prerenal etiology. No longer requiring fluids.     Leukocytosis  Assessment & Plan  May be reactive with no infection suspected at this time.   Not febrile does not appear septic so no indication for cultures.  - Resolved.    Elevated lactic acid level- (present on admission)  Assessment & Plan  Preliminary labs showed lactic acidosis (> 7.0 mg/dL).   - Improved to 2.1 mg/dL, 2/28/2023.   - Trend till normal.     Restless leg syndrome- (present on admission)  Assessment & Plan  Hold ropinirole at this time    Depression- (present on admission)  Assessment & Plan  Home Fluoxetine resumed.     AF (atrial fibrillation) ([facility])- (present on admission)  Assessment & Plan  CHADSVASC 7.  Last took Pradaxa yesterday.  - hold Pradaxa and metoprolol  -keep Mg >2, K>4    Ventral hernia with obstruction and without gangrene- (present on admission)  Assessment & Plan  Demonstrated on [facility] scan at [facility]    Chronic back pain- (present on admission)  Assessment & Plan  Hold current pain medications at this time and will treat with IV medication    HEATHER (obstructive sleep apnea)- (present on admission)  Assessment & Plan  Continue CPAP during hospitalization    HTN (hypertension)- (present on admission)  Assessment & Plan  Elevated BP (161/113 - 190/113) over last 24 hours, likely exacerbated by post-surgical pain.   - Home antihypertensives and pain medications resumed.        VTE prophylaxis: therapeutic anticoagulation with  Dabigatran    I have performed a physical exam and reviewed and updated ROS and Plan today (3/1/2023). In review of yesterday's note (2/28/2023), there are no changes except as documented above.

## 2023-03-01 NOTE — THERAPY
Occupational Therapy   Initial Evaluation     Patient Name: Yonny Wallace  Age:  63 y.o., Sex:  male  Medical Record #: [id number]  Today's Date: 3/1/2023     Precautions: Fall Risk  Comments: NG to suction    Assessment  Patient is 63 y.o. male transferred from outside hospital with bowel obstruction now s/p ex-lap and lysis of adhesions with limited surgical options with Hx that includes but not limited to HTN, HEATHER ventral hernia, obesity, SUHAS, CVA. Pt presented with poor activity tolerance, abdominal pain as well as low back pain, weakness, and impaired balance impacting ADLs/mobility. Pt completed self cares/mobility as outlined below. Recommend placement at this time. Will follow for skilled OT services.    Plan    Occupational Therapy Initial Treatment Plan   Treatment Interventions: Self Care / Activities of Daily Living, Adaptive Equipment, Neuro Re-Education / Balance, Therapeutic Exercises, Therapeutic Activity  Treatment Frequency: 4 Times per Week  Duration: Until Therapy Goals Met    DC Equipment Recommendations: Unable to determine at this time  Discharge Recommendations: Recommend post-acute placement for additional occupational therapy services prior to discharge home      03/01/23 1101   Prior Living Situation   Prior Services None   Housing / Facility 1 Story Apartment / Condo   Bathroom Set up Walk In Shower   Equipment Owned Single Point Cane   Lives with - Patient's Self Care Capacity Alone and Able to Care For Self   Comments Reports independence at baseline, including shopping, driving, etc   Prior Level of ADL Function   Self Feeding Independent   Grooming / Hygiene Independent   Bathing Independent   Dressing Independent   Toileting Independent   Prior Level of IADL Function   Medication Management Independent   Laundry Independent   Kitchen Mobility Independent   Finances Independent   Home Management Independent   Shopping Independent   Prior Level Of Mobility Independent Without Device in  Community   Precautions   Precautions Fall Risk   Comments NG to suction   Cognition    Cognition / Consciousness WDL   Comments pleasant and agreeable   Active ROM Upper Body   Active ROM Upper Body  WDL   Strength Upper Body   Upper Body Strength  WDL   Balance Assessment   Sitting Balance (Static) Fair   Sitting Balance (Dynamic) Fair   Standing Balance (Static) Fair -   Standing Balance (Dynamic) Fair -   Weight Shift Sitting Fair   Weight Shift Standing Fair   Comments with FWW   Bed Mobility    Sit to Supine Moderate Assist   ADL Assessment   Grooming Supervision;Seated   Upper Body Dressing Minimal Assist   Lower Body Dressing Maximal Assist   Comments limited by abdominal pain   How much help from another person does the patient currently need...   6 Clicks Daily Activity Score 16   Functional Mobility   Sit to Stand Contact Guard Assist   Bed, Chair, Wheelchair Transfer Contact Guard Assist   Patient / Family Goals   Patient / Family Goal #1 to be in less pain   Short Term Goals   Short Term Goal # 1 Pt will demo LB dressing using AD PRN SPV   Short Term Goal # 2 Pt will demo ADL txfs SPV   Short Term Goal # 3 Pt will complete toileting hygiene SPV   Education Group   Role of Occupational Therapist Patient Response Patient;Acceptance;Explanation   Occupational Therapy Initial Treatment Plan    Treatment Interventions Self Care / Activities of Daily Living;Adaptive Equipment;Neuro Re-Education / Balance;Therapeutic Exercises;Therapeutic Activity   Treatment Frequency 4 Times per Week   Duration Until Therapy Goals Met   Problem List   Problem List Decreased Active Daily Living Skills;Decreased Homemaking Skills;Decreased Functional Mobility;Decreased Activity Tolerance;Impaired Postural Control / Balance   Anticipated Discharge Equipment and Recommendations   DC Equipment Recommendations Unable to determine at this time   Discharge Recommendations Recommend post-acute placement for additional occupational  therapy services prior to discharge home

## 2023-03-01 NOTE — DISCHARGE PLANNING
Care Transition Team Assessment    Met with patient at bedside, discussed PLOF and confirmed demographics. His daughter Funmilayo lives 15 mins away and able to help out. POD 1 ex lap, NPO with NGT. Not yet stable for discharge. CM will continue to monitor for any needs.     Information Source  Orientation Level: Oriented X4    Elopement Risk  Legal Hold: No  Ambulatory or Self Mobile in Wheelchair: No-Not an Elopement Risk  Elopement Risk: Not at Risk for Elopement    Interdisciplinary Discharge Planning  Lives with - Patient's Self Care Capacity: Alone and Able to Care For Self  Patient or legal guardian wants to designate a caregiver: No  Support Systems: Family Member(s)  Housing / Facility: [address]  Able to Return to Previous ADL's: Future Time w/Therapy  Mobility Issues: No  Prior Services: None  Durable Medical Equipment: Not Applicable    Discharge Preparedness  What is your plan after discharge?: Home with help  What are your discharge supports?: Child  Prior Functional Level: Independent with Activities of Daily Living  Difficulity with ADLs: None  Difficulity with IADLs: None    Functional Assesment  Prior Functional Level: Independent with Activities of Daily Living  Vision / Hearing Impairment  Vision Impairment : No  Hearing Impairment : No  Domestic Abuse  Have you ever been the victim of abuse or violence?: No  Physical Abuse or Sexual Abuse: No  Verbal Abuse or Emotional Abuse: No  Possible Abuse/Neglect Reported to:: Not Applicable  Discharge Risks or Barriers  Discharge risks or barriers?: No    Anticipated Discharge Information  Discharge Disposition: Discharged to home/self care (01)  Discharge Address: [address] [address]

## 2023-03-01 NOTE — CARE PLAN
The patient is Stable - Low risk of patient condition declining or worsening    Shift Goals  Clinical Goals: NG tube, pain, nausea  Patient Goals: Rest, pain, nausea  Family Goals: Not present    Progress made toward(s) clinical / shift goals:    Problem: Knowledge Deficit - Standard  Goal: Patient and family/care givers will demonstrate understanding of plan of care, disease process/condition, diagnostic tests and medications  Outcome: Progressing     Problem: Pain - Standard  Goal: Alleviation of pain or a reduction in pain to the patient’s comfort goal  3/1/2023 0155 by Kristie Bonilla, R.N.  Outcome: Progressing  3/1/2023 0154 by Kristie Bonilla, R.N.  Note: Patient calls appropriatly for pharmacological interventions     Problem: Fall Risk  Goal: Patient will remain free from falls  Outcome: Progressing  Note: Bed alarm is on       Patient is not progressing towards the following goals:

## 2023-03-02 LAB
ALBUMIN SERPL BCP-MCNC: 4.1 G/DL (ref 3.2–4.9)
ALBUMIN/GLOB SERPL: 1.4 G/DL
ALP SERPL-CCNC: 48 U/L (ref 30–99)
ALT SERPL-CCNC: 17 U/L (ref 2–50)
ANION GAP SERPL CALC-SCNC: 11 MMOL/L (ref 7–16)
AST SERPL-CCNC: 16 U/L (ref 12–45)
BASOPHILS # BLD AUTO: 0.3 % (ref 0–1.8)
BASOPHILS # BLD: 0.04 K/UL (ref 0–0.12)
BILIRUB SERPL-MCNC: 1 MG/DL (ref 0.1–1.5)
BUN SERPL-MCNC: 25 MG/DL (ref 8–22)
CALCIUM ALBUM COR SERPL-MCNC: 9.6 MG/DL (ref 8.5–10.5)
CALCIUM SERPL-MCNC: 9.7 MG/DL (ref 8.5–10.5)
CHLORIDE SERPL-SCNC: 102 MMOL/L (ref 96–112)
CO2 SERPL-SCNC: 30 MMOL/L (ref 20–33)
CREAT SERPL-MCNC: 1.05 MG/DL (ref 0.5–1.4)
EOSINOPHIL # BLD AUTO: 0.04 K/UL (ref 0–0.51)
EOSINOPHIL NFR BLD: 0.3 % (ref 0–6.9)
ERYTHROCYTE [DISTWIDTH] IN BLOOD BY AUTOMATED COUNT: 50.2 FL (ref 35.9–50)
GFR SERPLBLD CREATININE-BSD FMLA CKD-EPI: 79 ML/MIN/1.73 M 2
GLOBULIN SER CALC-MCNC: 3 G/DL (ref 1.9–3.5)
GLUCOSE BLD STRIP.AUTO-MCNC: 129 MG/DL (ref 65–99)
GLUCOSE BLD STRIP.AUTO-MCNC: 133 MG/DL (ref 65–99)
GLUCOSE BLD STRIP.AUTO-MCNC: 138 MG/DL (ref 65–99)
GLUCOSE BLD STRIP.AUTO-MCNC: 149 MG/DL (ref 65–99)
GLUCOSE SERPL-MCNC: 169 MG/DL (ref 65–99)
HCT VFR BLD AUTO: 46 % (ref 42–52)
HGB BLD-MCNC: 15.3 G/DL (ref 14–18)
IMM GRANULOCYTES # BLD AUTO: 0.07 K/UL (ref 0–0.11)
IMM GRANULOCYTES NFR BLD AUTO: 0.5 % (ref 0–0.9)
LACTATE SERPL-SCNC: 1.5 MMOL/L (ref 0.5–2)
LYMPHOCYTES # BLD AUTO: 1.17 K/UL (ref 1–4.8)
LYMPHOCYTES NFR BLD: 8.5 % (ref 22–41)
MAGNESIUM SERPL-MCNC: 2.4 MG/DL (ref 1.5–2.5)
MCH RBC QN AUTO: 32.2 PG (ref 27–33)
MCHC RBC AUTO-ENTMCNC: 33.3 G/DL (ref 33.7–35.3)
MCV RBC AUTO: 96.8 FL (ref 81.4–97.8)
MONOCYTES # BLD AUTO: 1.75 K/UL (ref 0–0.85)
MONOCYTES NFR BLD AUTO: 12.7 % (ref 0–13.4)
NEUTROPHILS # BLD AUTO: 10.68 K/UL (ref 1.82–7.42)
NEUTROPHILS NFR BLD: 77.7 % (ref 44–72)
NRBC # BLD AUTO: 0.04 K/UL
NRBC BLD-RTO: 0.3 /100 WBC
PHOSPHATE SERPL-MCNC: 3.7 MG/DL (ref 2.5–4.5)
PLATELET # BLD AUTO: 276 K/UL (ref 164–446)
PMV BLD AUTO: 10.7 FL (ref 9–12.9)
POTASSIUM SERPL-SCNC: 4.1 MMOL/L (ref 3.6–5.5)
PROT SERPL-MCNC: 7.1 G/DL (ref 6–8.2)
RBC # BLD AUTO: 4.75 M/UL (ref 4.7–6.1)
SODIUM SERPL-SCNC: 143 MMOL/L (ref 135–145)
WBC # BLD AUTO: 13.8 K/UL (ref 4.8–10.8)

## 2023-03-02 PROCEDURE — 83735 ASSAY OF MAGNESIUM: CPT

## 2023-03-02 PROCEDURE — 84100 ASSAY OF PHOSPHORUS: CPT

## 2023-03-02 PROCEDURE — 700111 HCHG RX REV CODE 636 W/ 250 OVERRIDE (IP): Performed by: GENERAL PRACTICE

## 2023-03-02 PROCEDURE — 36415 COLL VENOUS BLD VENIPUNCTURE: CPT

## 2023-03-02 PROCEDURE — 700102 HCHG RX REV CODE 250 W/ 637 OVERRIDE(OP): Performed by: STUDENT IN AN ORGANIZED HEALTH CARE EDUCATION/TRAINING PROGRAM

## 2023-03-02 PROCEDURE — A9270 NON-COVERED ITEM OR SERVICE: HCPCS

## 2023-03-02 PROCEDURE — 80053 COMPREHEN METABOLIC PANEL: CPT

## 2023-03-02 PROCEDURE — A9270 NON-COVERED ITEM OR SERVICE: HCPCS | Performed by: STUDENT IN AN ORGANIZED HEALTH CARE EDUCATION/TRAINING PROGRAM

## 2023-03-02 PROCEDURE — 97530 THERAPEUTIC ACTIVITIES: CPT

## 2023-03-02 PROCEDURE — 99024 POSTOP FOLLOW-UP VISIT: CPT

## 2023-03-02 PROCEDURE — 700111 HCHG RX REV CODE 636 W/ 250 OVERRIDE (IP)

## 2023-03-02 PROCEDURE — 700102 HCHG RX REV CODE 250 W/ 637 OVERRIDE(OP)

## 2023-03-02 PROCEDURE — 82962 GLUCOSE BLOOD TEST: CPT

## 2023-03-02 PROCEDURE — 770001 HCHG ROOM/CARE - MED/SURG/GYN PRIV*

## 2023-03-02 PROCEDURE — 83605 ASSAY OF LACTIC ACID: CPT

## 2023-03-02 PROCEDURE — 74250 X-RAY XM SM INT 1CNTRST STD: CPT

## 2023-03-02 PROCEDURE — 700105 HCHG RX REV CODE 258: Performed by: STUDENT IN AN ORGANIZED HEALTH CARE EDUCATION/TRAINING PROGRAM

## 2023-03-02 PROCEDURE — 85025 COMPLETE CBC W/AUTO DIFF WBC: CPT

## 2023-03-02 PROCEDURE — 99233 SBSQ HOSP IP/OBS HIGH 50: CPT | Mod: GC | Performed by: STUDENT IN AN ORGANIZED HEALTH CARE EDUCATION/TRAINING PROGRAM

## 2023-03-02 RX ORDER — GABAPENTIN 300 MG/1
300 CAPSULE ORAL 3 TIMES DAILY
Status: DISCONTINUED | OUTPATIENT
Start: 2023-03-02 | End: 2023-03-04 | Stop reason: HOSPADM

## 2023-03-02 RX ORDER — OXYCODONE HYDROCHLORIDE 5 MG/1
5 TABLET ORAL EVERY 6 HOURS
Status: DISCONTINUED | OUTPATIENT
Start: 2023-03-02 | End: 2023-03-04 | Stop reason: HOSPADM

## 2023-03-02 RX ORDER — TERAZOSIN 1 MG/1
1 CAPSULE ORAL
Status: DISCONTINUED | OUTPATIENT
Start: 2023-03-02 | End: 2023-03-04 | Stop reason: HOSPADM

## 2023-03-02 RX ORDER — AMLODIPINE BESYLATE 5 MG/1
5 TABLET ORAL DAILY
Status: DISCONTINUED | OUTPATIENT
Start: 2023-03-02 | End: 2023-03-02

## 2023-03-02 RX ORDER — FLUOXETINE HYDROCHLORIDE 20 MG/1
60 CAPSULE ORAL DAILY
Status: DISCONTINUED | OUTPATIENT
Start: 2023-03-02 | End: 2023-03-04 | Stop reason: HOSPADM

## 2023-03-02 RX ORDER — OXYCODONE AND ACETAMINOPHEN 10; 325 MG/1; MG/1
1 TABLET ORAL EVERY 4 HOURS PRN
Status: DISCONTINUED | OUTPATIENT
Start: 2023-03-02 | End: 2023-03-04 | Stop reason: HOSPADM

## 2023-03-02 RX ORDER — GAUZE BANDAGE 2" X 2"
100 BANDAGE TOPICAL DAILY
Status: DISCONTINUED | OUTPATIENT
Start: 2023-03-02 | End: 2023-03-04 | Stop reason: HOSPADM

## 2023-03-02 RX ORDER — AMLODIPINE BESYLATE 10 MG/1
10 TABLET ORAL DAILY
Status: DISCONTINUED | OUTPATIENT
Start: 2023-03-03 | End: 2023-03-04 | Stop reason: HOSPADM

## 2023-03-02 RX ORDER — TERAZOSIN 2 MG/1
2 CAPSULE ORAL EVERY EVENING
Status: DISCONTINUED | OUTPATIENT
Start: 2023-03-02 | End: 2023-03-04 | Stop reason: HOSPADM

## 2023-03-02 RX ORDER — TIZANIDINE 4 MG/1
1 TABLET ORAL DAILY
Status: DISCONTINUED | OUTPATIENT
Start: 2023-03-02 | End: 2023-03-04 | Stop reason: HOSPADM

## 2023-03-02 RX ORDER — METOPROLOL TARTRATE 100 MG/1
100 TABLET ORAL 2 TIMES DAILY
Status: DISCONTINUED | OUTPATIENT
Start: 2023-03-02 | End: 2023-03-04 | Stop reason: HOSPADM

## 2023-03-02 RX ORDER — DABIGATRAN ETEXILATE 150 MG/1
150 CAPSULE ORAL 2 TIMES DAILY
Status: DISCONTINUED | OUTPATIENT
Start: 2023-03-02 | End: 2023-03-04 | Stop reason: HOSPADM

## 2023-03-02 RX ADMIN — PROCHLORPERAZINE EDISYLATE 5 MG: 5 INJECTION INTRAMUSCULAR; INTRAVENOUS at 19:38

## 2023-03-02 RX ADMIN — METOPROLOL 100 MG: 100 TABLET ORAL at 18:42

## 2023-03-02 RX ADMIN — PROCHLORPERAZINE EDISYLATE 5 MG: 5 INJECTION INTRAMUSCULAR; INTRAVENOUS at 04:37

## 2023-03-02 RX ADMIN — HYDROMORPHONE HYDROCHLORIDE 0.5 MG: 1 INJECTION, SOLUTION INTRAMUSCULAR; INTRAVENOUS; SUBCUTANEOUS at 21:15

## 2023-03-02 RX ADMIN — OXYCODONE AND ACETAMINOPHEN 1 TABLET: 10; 325 TABLET ORAL at 08:38

## 2023-03-02 RX ADMIN — OXYCODONE HYDROCHLORIDE 5 MG: 5 TABLET ORAL at 18:42

## 2023-03-02 RX ADMIN — TERAZOSIN 1 MG: 1 CAPSULE ORAL at 08:38

## 2023-03-02 RX ADMIN — HYDROMORPHONE HYDROCHLORIDE 0.5 MG: 1 INJECTION, SOLUTION INTRAMUSCULAR; INTRAVENOUS; SUBCUTANEOUS at 12:18

## 2023-03-02 RX ADMIN — TERAZOSIN HYDROCHLORIDE 2 MG: 2 CAPSULE ORAL at 18:43

## 2023-03-02 RX ADMIN — TIZANIDINE 1 MG: 4 TABLET ORAL at 05:39

## 2023-03-02 RX ADMIN — DABIGATRAN ETEXILATE MESYLATE 150 MG: 150 CAPSULE ORAL at 18:43

## 2023-03-02 RX ADMIN — HYDROMORPHONE HYDROCHLORIDE 0.5 MG: 1 INJECTION, SOLUTION INTRAMUSCULAR; INTRAVENOUS; SUBCUTANEOUS at 04:07

## 2023-03-02 RX ADMIN — FLUOXETINE 60 MG: 20 CAPSULE ORAL at 05:40

## 2023-03-02 RX ADMIN — OXYCODONE HYDROCHLORIDE 5 MG: 5 TABLET ORAL at 05:38

## 2023-03-02 RX ADMIN — AMLODIPINE BESYLATE 5 MG: 5 TABLET ORAL at 05:38

## 2023-03-02 RX ADMIN — GABAPENTIN 300 MG: 300 CAPSULE ORAL at 18:42

## 2023-03-02 RX ADMIN — METOPROLOL 100 MG: 100 TABLET ORAL at 05:38

## 2023-03-02 RX ADMIN — SODIUM CHLORIDE, POTASSIUM CHLORIDE, SODIUM LACTATE AND CALCIUM CHLORIDE: 600; 310; 30; 20 INJECTION, SOLUTION INTRAVENOUS at 18:52

## 2023-03-02 RX ADMIN — LABETALOL HYDROCHLORIDE 10 MG: 5 INJECTION, SOLUTION INTRAVENOUS at 04:31

## 2023-03-02 RX ADMIN — HYDROMORPHONE HYDROCHLORIDE 0.5 MG: 1 INJECTION, SOLUTION INTRAMUSCULAR; INTRAVENOUS; SUBCUTANEOUS at 16:20

## 2023-03-02 RX ADMIN — OXYCODONE AND ACETAMINOPHEN 1 TABLET: 10; 325 TABLET ORAL at 15:12

## 2023-03-02 RX ADMIN — LABETALOL HYDROCHLORIDE 10 MG: 5 INJECTION, SOLUTION INTRAVENOUS at 19:47

## 2023-03-02 RX ADMIN — OXYCODONE AND ACETAMINOPHEN 1 TABLET: 10; 325 TABLET ORAL at 19:38

## 2023-03-02 ASSESSMENT — PAIN DESCRIPTION - PAIN TYPE
TYPE: ACUTE PAIN
TYPE: ACUTE PAIN;SURGICAL PAIN
TYPE: ACUTE PAIN;SURGICAL PAIN

## 2023-03-02 ASSESSMENT — COGNITIVE AND FUNCTIONAL STATUS - GENERAL
TURNING FROM BACK TO SIDE WHILE IN FLAT BAD: A LITTLE
MOBILITY SCORE: 15
CLIMB 3 TO 5 STEPS WITH RAILING: A LOT
SUGGESTED CMS G CODE MODIFIER MOBILITY: CK
WALKING IN HOSPITAL ROOM: A LITTLE
MOVING TO AND FROM BED TO CHAIR: A LITTLE
STANDING UP FROM CHAIR USING ARMS: A LITTLE
MOVING FROM LYING ON BACK TO SITTING ON SIDE OF FLAT BED: UNABLE

## 2023-03-02 ASSESSMENT — CHA2DS2 SCORE
DIABETES: YES
CHA2DS2 VASC SCORE: 7
HYPERTENSION: YES
AGE 75 OR GREATER: NO
PRIOR STROKE OR TIA OR THROMBOEMBOLISM: YES
SEX: MALE
CHF OR LEFT VENTRICULAR DYSFUNCTION: YES
VASCULAR DISEASE: YES
AGE 65 TO 74: YES

## 2023-03-02 ASSESSMENT — GAIT ASSESSMENTS
DISTANCE (FEET): 75
GAIT LEVEL OF ASSIST: STANDBY ASSIST
ASSISTIVE DEVICE: FRONT WHEEL WALKER
DEVIATION: BRADYKINETIC;INCREASED BASE OF SUPPORT;DECREASED HEEL STRIKE;DECREASED TOE OFF

## 2023-03-02 NOTE — CARE PLAN
The patient is Stable - Low risk of patient condition declining or worsening    Shift Goals  Clinical Goals: Pain control, x rays for barium  Patient Goals: Pain control, comfort  Family Goals: Not present    Progress made toward(s) clinical / shift goals:    Problem: Knowledge Deficit - Standard  Goal: Patient and family/care givers will demonstrate understanding of plan of care, disease process/condition, diagnostic tests and medications  Outcome: Progressing     Problem: Pain - Standard  Goal: Alleviation of pain or a reduction in pain to the patient’s comfort goal  Outcome: Progressing     Problem: Fall Risk  Goal: Patient will remain free from falls  Outcome: Progressing  Note: Patient is a moderate fall risk, he is alert and oriented.        Patient is not progressing towards the following goals:

## 2023-03-02 NOTE — DISCHARGE INSTRUCTIONS
Post Operative Discharge Instructions:    1. DIET: Upon discharge from the hospital, you may resume your normal preoperative diet, unless specifically directed otherwise. Depending on how you are feeling and whether you have nausea or not, you may wish to stay with a bland diet for the first few days. However, you can advance this as quickly as you feel ready.    2. ACTIVITIES: After discharge from the hospital, you may resume full routine activities; however, there should be no heavy lifting (greater than 20 pounds or a bag of groceries) and no strenuous activities for at least 2 weeks. The duration may be longer, depending on your surgical procedure. Routine activities of daily living are acceptable. Activity level should be addressed at your post-op follow up appointment.    3. DRIVING: You may drive whenever you are off pain medications and are able to perform the activities needed to drive, i.e., turning, bending, twisting, etc.    4. BATHING: You may get the wound wet at any time after leaving the hospital. You may shower, but do not submerge in a bath for at least two weeks.  If you have wound dressings, they may come off after 48 hours.  If you have skin glue to the wound, this will fall off on its own, do not pick at it.  If you have Steri-Strips to the wound, these will fall off on their own, do not pick at them. You may trim the edges if needed.    5. BOWEL FUNCTION:   After surgery, it is not uncommon for patients to develop either frequent or loose stools after meals. This usually corrects itself after a few days, to a few weeks. If this occurs, do not worry; this will resolve on its own.  Constipation is much more common than loose stools. The cause is the combination of pain medication and decreased activity level and possibly the nature of the surgical procedure performed. If you feel this is occurring, you may use an over-the-counter treatment such as MiraLAX (or Milk of Magnesia, Ex-Lax,  Senokot, etc.) until the problem has resolved. Drink plenty of water and try to wean off narcotic pain medications as soon as is comfortable for you.    6. PAIN MEDICATION:   You will be given a prescription for pain medication at discharge. Please take these as directed. It is important to remember not to take medications on an empty stomach as this may cause nausea.  You may also take over the counter acetaminophen and/or NSAIDS (ibuprofen, Aleve, Advil, Motrin) per the package instructions.  You may also use ice to the wound to decrease pain and swelling. You may alternate 20 minutes on and 20 minutes off with the ice for the first 24-48 hours. Make sure you place a washcloth or towel between the ice pack and your skin.  Please note that narcotic pain medication cannot be refilled unless you are seen by a doctor. Make sure you call the office if you are running low on medication or if the dose you have been prescribed is not working well for you.    7.CALL THE [facility] AT [phone] IF YOU HAVE:  (1) Fevers to more than 101F, (2) Unusual chest or leg pain, (3) Drainage or fluid from incision that may be foul smelling, increased tenderness or soreness at the wound or the wound edges are no longer together, redness or swelling at the incision site. Do not hesitate to call with any other questions.        Arelis VIEIRA, confirmed acceptance, contact number is [facility], Tel: [phone].

## 2023-03-02 NOTE — CARE PLAN
The patient is Stable - Low risk of patient condition declining or worsening      Problem: Knowledge Deficit - Standard  Goal: Patient and family/care givers will demonstrate understanding of plan of care, disease process/condition, diagnostic tests and medications  Outcome: Progressing  Patient updated on POC. All questions answered.       Problem: Communication  Goal: The ability to communicate needs accurately and effectively will improve  Outcome: Progressing  Pt communicates needs effectively.   Call light w/in reach.

## 2023-03-02 NOTE — HEART FAILURE PROGRAM
Patient is admitted for massive lower abdominal hernia with loss of abdominal domain. 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  As long as patient remains in compensated heart failure, HF f/u appointment and discharge checklist not indicated.

## 2023-03-02 NOTE — PROGRESS NOTES
"    DATE: 3/2/2023    Post Operative Day 2 Exploratory laparotomy and lysis of adhesions.    INTERVAL EVENTS:  SBFT with no evidence of SBO.  Bowel movement x 2 today.    PHYSICAL EXAMINATION:  Vital Signs: BP (!) 186/126   Pulse 89   Temp 36.4 °C (97.5 °F) (Temporal)   Resp 17   Ht 1.727 m (5' 8\")   Wt (!) 137 kg (302 lb)   SpO2 94%     Obese abdomen. Tenderness at surgical incision.  Dressing with old drainage.      ASSESSMENT AND PLAN:   Clamp NGT.  Start on clear liquid diet with slow advancement.  Sit in chair today, encourage mobilization.  Continue to follow closely.    Discussed patient condition with RN, Patient, and general surgery, Dr. Can.    "

## 2023-03-02 NOTE — PROGRESS NOTES
[facility] Daily Progress Note    Date of Service  3/2/2023    [facility] Team: [facility] Team   Attending: Guilherme Pritchett M.d.  Senior Resident: Dr. Alondra Gonzales MD  Intern:  Dr. Roscoe Hagan DO   Contact Number: [phone]    Chief Complaint  63yoM with small bowel obstruction, POD #2 from exploratory laparotomy with lysis of adhesions to prior mesh hernia repair. Patient has history of recurrent SBO.    Hospital Course  Yonny Wallace is a 63 y.o. male with a history of recurrent small bowel obstructions with 3 in the past year and the most recent in September 2022 quiring multiple abdominal surgeries, large ventral hernia, atrial fibrillation on Pradaxa, HFmrEF 40-45% May 2022, TIA, CVA, HTN, MI, Sleep apnea, NIDDM2 A1c 6.5% May 2022 who presented 2/28/2023 as a transfer from [facility] who presented to the hospital for abdominal pain with CT scan demonstrating evidence of a small bowel obstruction with elevated white count.  She has had no flatus since 1400 yesterday and allowed a large meal around the same time yesterday.  In the past the patient has been recommended to go to [facility] to see a specialist as he needs an extensive abdominal wall reconstruction    Patient reports that he saw the surgeon at [facility] in November 2022 was recommended to lose 30 to 40 pounds prior to consideration for surgery; however, the patient reports he has gained about 20 pounds since then.  Reports he pulled his NG tube out in route to [facility] and has been vomiting without blood.  Reports his abdominal pain is about the same as at [place] at 8 out of 10.  Denies fever chills or any other symptoms.  Patient reports he last took Pradaxa at 3 PM on 2/27/2023.     WBC of 14, anion gap of 21, glucose 159, creatinine 1.8 (baseline 1.2), lipase 52, lactate 4.4. CT abdomen pelvis shows a large ventral abdominal hernia contains both tightly collapsed small bowel loops and fluid distended small bowel  loops with a transition point in the hernia sac.  This is consistent with a high-grade small bowel obstruction without perforation or abscess formation related to the hernia.  No mesenteric edema suspicion for bowel ischemia. Given 2 L of IV fluids at outside facility prior to transfer.    Interval Problem Update  - POD #2 from exploratory laparotomy with lysis of adhesions to prior mesh hernia repair. Exam notable for intermittent tachycardia (HR ), high BP (139/84 - 189/119), well-healing vertical laparotomy incision without any drainage evident. Bowel sounds are normal and he passed 2 large bowel movements overnight.   - Small bowel follow-through (3/1/2023) showed no evidence of obstruction.  - Advanced diet to clear liquids; resumed home Pradaxa.     I have discussed this patient's plan of care and discharge plan at IDT rounds today with Case Management, Nursing, Nursing leadership, and other members of the IDT team.    Consultants/Specialty  GI    Code Status  Full Code    Disposition  Patient is not medically cleared for discharge.   Anticipate discharge to to home with close outpatient follow-up.  I have placed the appropriate orders for post-discharge needs.    Review of Systems  Review of Systems   Constitutional:  Negative for chills and fever.   Respiratory:  Negative for cough and shortness of breath.    Cardiovascular:  Negative for chest pain and palpitations.   Gastrointestinal:  Positive for abdominal pain, nausea and vomiting. Negative for constipation, diarrhea and heartburn.   Neurological:  Negative for dizziness, weakness and headaches.     Home Medications    Medication Sig Taking? Last Dose Authorizing Provider   gabapentin (NEURONTIN) 300 MG Cap Take 1 Capsule by mouth 3 times a day.   Carter Murphy M.D.   MOVANTIK 25 MG Tab TAKE ONE TABLET BY MOUTH OR FEEDING TUBE DAILY  2/27/2023 Carter Murphy M.D.   pravastatin (PRAVACHOL) 40 MG tablet TAKE ONE TABLET BY MOUTH EVERY EVENING   2/27/2023 Carter Murphy M.D.   ROPINIRole (REQUIP) 0.25 MG Tab TAKE 3 TO 7 TABLETS BY MOUTH AT BEDTIME WITH 1 MG TABLET AS DICTATED BY SYMPTOMS  2/27/2023 Carter Murphy M.D.   terazosin (HYTRIN) 1 MG Cap TAKE ONE CAPSULE BY MOUTH EVERY MORNING AND TAKE TWO CAPSULES EVERY NIGHT AT BEDTIME  2/27/2023 Carter Murphy M.D.   amLODIPine (NORVASC) 5 MG Tab Take 1 Tablet by mouth every day.  2/27/2023 Vern Villatoro M.D.   furosemide (LASIX) 20 MG Tab 2 tablets daily in am  2/27/2023 Carter Murphy M.D.   albuterol 108 (90 Base) MCG/ACT Aero Soln inhalation aerosol INHALE TWO PUFFS BY MOUTH EVERY 6 HOURS AS NEEDED FOR SHORTNESS OF BREATH - taking more often lately  2/27/2023 Carter Murphy M.D.   metoprolol tartrate (LOPRESSOR) 100 MG Tab Take 1 Tablet by mouth 2 times a day.  2/27/2023 Amrik Ardon M.D.   PRADAXA 150 MG Cap capsule TAKE ONE CAPSULE BY MOUTH TWICE A DAY  2/27/2023 Carter Murphy M.D.   FLUoxetine (PROZAC) 20 MG Cap TAKE THREE CAPSULES BY MOUTH EVERY MORNING  2/27/2023 Carter Murphy M.D.   hydrocortisone rectal (PERIANAL) 2.5% Cream APPLY TO AFFECTED AREA(S) TWO TIMES A DAY AS DIRECTED  2/27/2023 Carter Murphy M.D.   EPINEPHrine (EPIPEN) 0.3 MG/0.3ML Solution Auto-injector solution for injection Inject 0.3 mL into the shoulder, thigh, or buttocks as needed.   Carter Murphy M.D.   hydrocortisone 2.5 % Cream topical cream 2 times daily as needed  2/27/2023 Carter Murphy M.D.   potassium chloride (MICRO-K) 10 MEQ capsule Take 1 capsule by mouth 2 times a day.  2/27/2023 Carter Murphy M.D.   polyethylene glycol/lytes (MIRALAX) 17 g Pack Take 1 Packet by mouth every day.  2/21/2023 Gilberto Mack M.D.   thiamine (THIAMINE) 100 MG tablet Take 1 tablet by mouth every day.  2/27/2023 Gilberto Mack M.D.   Respiratory Therapy Supplies (CARETOUCH 2 CPAP HOSE ) [facility] CPAP   Physician Outpatient   ROPINIRole (REQUIP) 1 MG Tab 1 tab at hs with an increasing amount of the 0.25 mg tablets as  directed  2/27/2023 Carter Murphy M.D.   oxyCODONE-acetaminophen (PERCOCET-10)  MG Tab Take 1 Tab by mouth every four hours as needed for Severe Pain.  2/27/2023 Physician Outpatient   tizanidine (ZANAFLEX) 2 MG tablet Take 1 mg by mouth every day.  2/27/2023 Physician Outpatient   oxyCODONE HCl (OXYCONTIN PO) Take 15 mg by mouth 2 Times a Day.  2/27/2023 Physician Outpatient   Blood Glucose Monitoring Suppl SUPPLIES Misc Test strips for Freestyle meter. Use to test blood sugars 2-3 times daily, Diagnosis code E11.9   To Pickens D.O.   Lancets Misc Lancets for Freestyle meter. Use to test blood sugars 2-3 times daily, Diagnosis code E11.9   To Pickens D.O.   diphenhydrAMINE (BENADRYL) 25 MG TABS Take 50 mg by mouth every 6 hours as needed.   Physician Outpatient        Physical Exam  Temp:  [36.2 °C (97.2 °F)-37.2 °C (99 °F)] 37.2 °C (99 °F)  Pulse:  [79-98] 89  Resp:  [16-17] 16  BP: (139-189)/() 162/104  SpO2:  [90 %-95 %] 94 %    Vitals and nursing note reviewed.   Constitutional:       General: He is not in acute distress.     Appearance: He is ill-appearing. He is not toxic-appearing or diaphoretic.   HENT:      Head: Normocephalic and atraumatic.      Mouth/Throat:      Mouth: Mucous membranes are dry.      Pharynx: No oropharyngeal exudate or posterior oropharyngeal erythema.   Eyes:      Conjunctiva/sclera: Conjunctivae normal.   Cardiovascular:      Rate and Rhythm: Normal rate and regular rhythm.      Pulses: Normal pulses.      Heart sounds: Normal heart sounds. No murmur heard.    No friction rub. No gallop.   Pulmonary:      Effort: Pulmonary effort is normal. No respiratory distress.      Breath sounds: Normal breath sounds. No stridor. No wheezing, rhonchi or rales.   Abdominal:      General: Abdomen is protuberant.      Tenderness: There is abdominal tenderness. There is no involuntary guarding or rebound.      Comments: Large ventral hernia on right mid abdomen, well-healing  but moderately tender vertical laparotomy incision with minimal drainage, NG tube in place. Bowel sounds diminished.  Musculoskeletal:      Cervical back: Neck supple. No tenderness.   Skin:     General: Skin is dry.      Findings: No lesion or rash.   Neurological:      General: No focal deficit present.      Mental Status: He is oriented to person, place, and time.   Psychiatric:         Mood and Affect: Mood normal.         Behavior: Behavior normal.     Fluids    Intake/Output Summary (Last 24 hours) at 3/2/2023 1828  Last data filed at 3/2/2023 1511  Gross per 24 hour   Intake 800 ml   Output 3900 ml   Net -3100 ml       Laboratory  Recent Labs     02/28/23  0309 03/01/23  0350 03/02/23  0808   WBC 18.1* 10.5 13.8*   RBC 5.27 4.60* 4.75   HEMOGLOBIN 16.9 14.7 15.3   HEMATOCRIT 49.7 45.0 46.0   MCV 94.3 97.8 96.8   MCH 32.1 32.0 32.2   MCHC 34.0 32.7* 33.3*   RDW 45.6 49.8 50.2*   PLATELETCT 355 314 276   MPV 10.4 10.9 10.7     Recent Labs     02/28/23  0309 03/01/23  0350 03/02/23  0420   SODIUM 139 141 143   POTASSIUM 4.7 4.2 4.1   CHLORIDE 98 102 102   CO2 20 28 30   GLUCOSE 214* 171* 169*   BUN 32* 30* 25*   CREATININE 1.88* 1.31 1.05   CALCIUM 10.6* 9.3 9.7                   Imaging  DX-SMALL BOWEL SERIES   Final Result         1.  Bowel distention without identified high-grade obstruction.   2.  Diverticulosis      OUTSIDE IMAGES-DX CHEST   Final Result      OUTSIDE IMAGES-CT ABDOMEN /PELVIS   Final Result         Scheduled Medications   Medication Dose Frequency    amLODIPine  5 mg DAILY    FLUoxetine  60 mg DAILY    gabapentin  300 mg TID    metoprolol tartrate  100 mg BID    oxyCODONE immediate-release  5 mg Q6HRS    terazosin  1 mg QDAY with Breakfast    terazosin  2 mg Q EVENING    thiamine  100 mg DAILY    tizanidine  1 mg DAILY    dabigatran  150 mg BID    insulin regular  1-6 Units Q6HRS      Assessment/Plan  Problem Representation:    * SBO (small bowel obstruction) (HCC)  Assessment & Plan  POD  #2 from exploratory laparotomy with lysis of adhesions to prior mesh hernia repair. Exam notable for intermittent tachycardia (HR ), high BP (139/84 - 189/119), well-healing vertical laparotomy incision without any drainage evident. Bowel sounds are normal and he passed 2 large bowel movements overnight.   - On anti-emetics, home pain meds.  - Ceftriaxone 2g daily (2/28 - 3/5/2023), IV Metronidazole 500mg q8hrs (2/28 - 3/5/2023) discontinued yesterday.   - Small bowel follow-through (3/1/2023) showed no evidence of obstruction.  - Advanced diet to clear liquids.      Type 2 diabetes mellitus, without long-term current use of insulin ([facility])  Assessment & Plan  A1c 6.5 in May 2022  -Insulin sliding scale hypoglycemia protocol and Accu-Cheks    Class 3 severe obesity due to excess calories with serious comorbidity and body mass index (BMI) of 45.0 to 49.9 in adult ([facility])  Assessment & Plan  Encourage weight loss.      (HFpEF) heart failure with preserved ejection fraction ([facility])  Assessment & Plan  Not on ACE-I/ARB. Home Metoprolol, Amlodipine resumed on 3/1/2023. Lasix still on hold.     Hyperglycemia  Assessment & Plan  Does have a history of non-insulin-dependent diabetes type 2 with A1c of 6.5% May 2022.  Suspect reactive though.  - Improving; will continue to monitor.      SUHAS (acute kidney injury) ([facility])  Assessment & Plan  Prerenal azotemia likely secondary to small bowel obstruction and fluid losses.  - Labs (3/1/2023) now show improving SUHAS (BUN 30, SCr 1.88->1.31; BL 1.2), likely prerenal etiology. No longer requiring fluids.     Leukocytosis  Assessment & Plan  May be reactive with no infection suspected at this time.   Not febrile does not appear septic so no indication for cultures.  - Resolved.    Elevated lactic acid level- (present on admission)  Assessment & Plan  Preliminary labs showed lactic acidosis (> 7.0 mg/dL).   - Improved to 2.1 mg/dL, 2/28/2023.   - Trend till normal.     Restless leg  syndrome- (present on admission)  Assessment & Plan  Hold ropinirole at this time    Depression- (present on admission)  Assessment & Plan  Home Fluoxetine resumed.     AF (atrial fibrillation) (HCC)- (present on admission)  Assessment & Plan  CHADSVASC 7.  Last took Pradaxa yesterday.  - hold Pradaxa and metoprolol  -keep Mg >2, K>4    Ventral hernia with obstruction and without gangrene- (present on admission)  Assessment & Plan  Demonstrated on [facility] scan at [facility]    Chronic back pain- (present on admission)  Assessment & Plan  Hold current pain medications at this time and will treat with IV medication    HEATHER (obstructive sleep apnea)- (present on admission)  Assessment & Plan  Continue CPAP during hospitalization    HTN (hypertension)- (present on admission)  Assessment & Plan  Elevated BP (161/113 - 190/113) over last 24 hours, likely exacerbated by post-surgical pain.   - Home antihypertensives and pain medications resumed.        VTE prophylaxis: therapeutic anticoagulation with Dabigatran    I have performed a physical exam and reviewed and updated ROS and Plan today (3/2/2023). In review of yesterday's note (3/1/2023), there are no changes except as documented above.

## 2023-03-02 NOTE — PROGRESS NOTES
Called and clarified with X ray, ok to take medications orally while performing barium contrast.

## 2023-03-03 LAB
GLUCOSE BLD STRIP.AUTO-MCNC: 102 MG/DL (ref 65–99)
GLUCOSE BLD STRIP.AUTO-MCNC: 106 MG/DL (ref 65–99)
GLUCOSE BLD STRIP.AUTO-MCNC: 118 MG/DL (ref 65–99)
GLUCOSE BLD STRIP.AUTO-MCNC: 144 MG/DL (ref 65–99)

## 2023-03-03 PROCEDURE — 700102 HCHG RX REV CODE 250 W/ 637 OVERRIDE(OP): Performed by: STUDENT IN AN ORGANIZED HEALTH CARE EDUCATION/TRAINING PROGRAM

## 2023-03-03 PROCEDURE — 700111 HCHG RX REV CODE 636 W/ 250 OVERRIDE (IP)

## 2023-03-03 PROCEDURE — A9270 NON-COVERED ITEM OR SERVICE: HCPCS

## 2023-03-03 PROCEDURE — 700102 HCHG RX REV CODE 250 W/ 637 OVERRIDE(OP)

## 2023-03-03 PROCEDURE — 770001 HCHG ROOM/CARE - MED/SURG/GYN PRIV*

## 2023-03-03 PROCEDURE — 700111 HCHG RX REV CODE 636 W/ 250 OVERRIDE (IP): Performed by: GENERAL PRACTICE

## 2023-03-03 PROCEDURE — A9270 NON-COVERED ITEM OR SERVICE: HCPCS | Performed by: STUDENT IN AN ORGANIZED HEALTH CARE EDUCATION/TRAINING PROGRAM

## 2023-03-03 PROCEDURE — 99232 SBSQ HOSP IP/OBS MODERATE 35: CPT | Mod: GC | Performed by: STUDENT IN AN ORGANIZED HEALTH CARE EDUCATION/TRAINING PROGRAM

## 2023-03-03 PROCEDURE — 82962 GLUCOSE BLOOD TEST: CPT | Mod: 91

## 2023-03-03 PROCEDURE — 99024 POSTOP FOLLOW-UP VISIT: CPT

## 2023-03-03 RX ADMIN — OXYCODONE AND ACETAMINOPHEN 1 TABLET: 10; 325 TABLET ORAL at 08:21

## 2023-03-03 RX ADMIN — OXYCODONE HYDROCHLORIDE 5 MG: 5 TABLET ORAL at 00:15

## 2023-03-03 RX ADMIN — DABIGATRAN ETEXILATE MESYLATE 150 MG: 150 CAPSULE ORAL at 17:23

## 2023-03-03 RX ADMIN — PROCHLORPERAZINE EDISYLATE 5 MG: 5 INJECTION INTRAMUSCULAR; INTRAVENOUS at 04:20

## 2023-03-03 RX ADMIN — AMLODIPINE BESYLATE 10 MG: 10 TABLET ORAL at 05:37

## 2023-03-03 RX ADMIN — METOPROLOL 100 MG: 100 TABLET ORAL at 17:24

## 2023-03-03 RX ADMIN — HYDROMORPHONE HYDROCHLORIDE 0.5 MG: 1 INJECTION, SOLUTION INTRAMUSCULAR; INTRAVENOUS; SUBCUTANEOUS at 10:37

## 2023-03-03 RX ADMIN — FLUOXETINE 60 MG: 20 CAPSULE ORAL at 04:21

## 2023-03-03 RX ADMIN — HYDROMORPHONE HYDROCHLORIDE 0.5 MG: 1 INJECTION, SOLUTION INTRAMUSCULAR; INTRAVENOUS; SUBCUTANEOUS at 04:17

## 2023-03-03 RX ADMIN — METOPROLOL 100 MG: 100 TABLET ORAL at 04:22

## 2023-03-03 RX ADMIN — OXYCODONE HYDROCHLORIDE 5 MG: 5 TABLET ORAL at 17:27

## 2023-03-03 RX ADMIN — TERAZOSIN 1 MG: 1 CAPSULE ORAL at 10:32

## 2023-03-03 RX ADMIN — THIAMINE HCL TAB 100 MG 100 MG: 100 TAB at 04:22

## 2023-03-03 RX ADMIN — OXYCODONE AND ACETAMINOPHEN 1 TABLET: 10; 325 TABLET ORAL at 19:35

## 2023-03-03 RX ADMIN — TERAZOSIN HYDROCHLORIDE 2 MG: 2 CAPSULE ORAL at 17:27

## 2023-03-03 RX ADMIN — TIZANIDINE 1 MG: 4 TABLET ORAL at 04:21

## 2023-03-03 RX ADMIN — DABIGATRAN ETEXILATE MESYLATE 150 MG: 150 CAPSULE ORAL at 04:21

## 2023-03-03 RX ADMIN — OXYCODONE HYDROCHLORIDE 5 MG: 5 TABLET ORAL at 12:53

## 2023-03-03 ASSESSMENT — PAIN DESCRIPTION - PAIN TYPE
TYPE: ACUTE PAIN
TYPE: ACUTE PAIN;SURGICAL PAIN
TYPE: ACUTE PAIN
TYPE: ACUTE PAIN

## 2023-03-03 NOTE — DISCHARGE INSTR - CASE MGT
[facility] will contact you regarding time of first home visit, Tel: [phone].

## 2023-03-03 NOTE — PROGRESS NOTES
"    DATE: 3/3/2023    Post Operative Day 3 Exploratory laparotomy and lysis of adhesions.    INTERVAL EVENTS:  Tolerating diet.  Multiple bowel movements.  Incision with no signs of infection.    PHYSICAL EXAMINATION:  Vital Signs: /86   Pulse 73   Temp 36.3 °C (97.3 °F) (Temporal)   Resp 16   Ht 1.727 m (5' 8\")   Wt (!) 137 kg (302 lb)   SpO2 95%     Obese abdomen.   Midline incision with staples, well approximated, no signs of infection.      ASSESSMENT AND PLAN:   Discontinue NGT.  Advance diet.  Encourage mobilization and incentive spirometer.  Will continue to follow.    Discussed patient condition with RN, Patient, and general surgery, Dr. Gentile.    "

## 2023-03-03 NOTE — THERAPY
"Physical Therapy   Daily Treatment     Patient Name: Yonny Wallace  Age:  63 y.o., Sex:  male  Medical Record #: [id number]  Today's Date: 3/2/2023     Precautions  Precautions: Fall Risk  Comments: NG clamped    Assessment    Pt now POD 2. Pt demonstrated improvements from previous session: ambulation 75 ft with FWW with SBA, toilet/bed transfer with FWW with CGA, and all bed mobility via log roll with SBA. Pt continues to present with pain, impaired mobility, balance, and decreased endurance and activity tolerance. Recommend home with HH once pt demonstrates negotiation of 1 small stair to safely enter home. DME needed: FWW. Will continue to follow for IP acute therapy to address above functional deficits.     Plan    Treatment Plan Status: Continue Current Treatment Plan  Type of Treatment: Bed Mobility, Gait Training, Self Care / Home Evaluation, Stair Training, Therapeutic Activities, Therapeutic Exercise  Treatment Frequency: 4 Times per Week  Treatment Duration: Until Therapy Goals Met    DC Equipment Recommendations: Front-Wheel Walker  Discharge Recommendations: Recommend home health for continued physical therapy services      Subjective    \"At least I got up and walked to the bathroom\"     Objective       23 1620   Cosignature   Documentation Review Approved with modifications made by preceptor in flowsheet   Precautions   Precautions Fall Risk   Comments NG clamped   Vitals   Blood Pressure  (!) 162/104  (supine: 162/106, HR 92; EOB: 171/98, HR 93; standin/96, HR: 101)   O2 (LPM) 3   O2 Delivery Device Silicone Nasal Cannula   Vitals Comments pt reported no dizziness throughout session. WOB increased when off oxygen for transport to and from bathroom   Pain 0 - 10 Group   Therapist Pain Assessment During Activity;Nurse Notified;Prior to Activity  (not formally assessed, pt reporting abdominal and back pain during rest and mobility)   Cognition    Cognition / Consciousness WDL   Level of " Consciousness Alert   Comments Pleasant and agreeable. Initially declined mobility, however, was agreeable after slight encouragement and desire to go to bathroom   Active ROM Lower Body    Active ROM Lower Body  WDL   Comments hip flexion limited 2/2 to abdominal mass. WFL for functional mobility   Strength Lower Body   Lower Body Strength  WDL   Comments demonstrated during functional mobility. Grossly 4/5. Limited 2/2 to pain   Sensation Lower Body   Lower Extremity Sensation   WDL   Comments pt reported no N/T or sensory changes   Lower Body Muscle Tone   Lower Body Muscle Tone  WDL   Other Treatments   Other Treatments Provided education regarding abdominal precautions: log roll, no bearing down when on toilet. Assistance with toileting   Neurological Concerns   Neurological Concerns No   Balance   Sitting Balance (Static) Fair +   Sitting Balance (Dynamic) Fair   Standing Balance (Static) Fair   Standing Balance (Dynamic) Fair -   Weight Shift Sitting Fair   Weight Shift Standing Fair   Skilled Intervention Compensatory Strategies;Verbal Cuing   Comments with FWW   Bed Mobility    Supine to Sit Standby Assist   Sit to Supine Standby Assist   Scooting Standby Assist   Rolling Standby Assist   Skilled Intervention Compensatory Strategies;Sequencing;Verbal Cuing   Comments via log roll. Required increased time and effort 2/2 to pain and fatigue   Gait Analysis   Gait Level Of Assist Standby Assist   Assistive Device Front Wheel Walker   Distance (Feet) 75  (25 to bathroom, 50 within room)   # of Times Distance was Traveled 1  (rest break for toileting)   Deviation Bradykinetic;Increased Base Of Support;Decreased Heel Strike;Decreased Toe Off   # of Stairs Climbed 0   Weight Bearing Status no restrictions   Skilled Intervention Compensatory Strategies;Sequencing;Verbal Cuing   Comments limited 2/2 to pain and fatigue; increased WOB during ambulation with no O2   Functional Mobility   Sit to Stand Contact Guard  Assist   Bed, Chair, Wheelchair Transfer Contact Guard Assist   Toilet Transfers Contact Guard Assist   Transfer Method Stand Step   Mobility supine > EOB > toilet > ambulate within room > return supine   Skilled Intervention Compensatory Strategies;Sequencing;Verbal Cuing   Comments required increased time and effort 2/2 to pain and fatigue   How much difficulty does the patient currently have...   Turning over in bed (including adjusting bedclothes, sheets and blankets)? 3   Sitting down on and standing up from a chair with arms (e.g., wheelchair, bedside commode, etc.) 1  (FWW)   Moving from lying on back to sitting on the side of the bed? 3   How much help from another person does the patient currently need...   Moving to and from a bed to a chair (including a wheelchair)? 3   Need to walk in a hospital room? 3   Climbing 3-5 steps with a railing? 2   6 clicks Mobility Score 15   Activity Tolerance   Sitting Edge of Bed 2 min   Standing 10 min   Comments decreased tolerance in standing   Patient / Family Goals    Patient / Family Goal #1 to return home   Goal #1 Outcome Progressing as expected   Short Term Goals    Short Term Goal # 1 pt will perform supine <> sit with HOB flat, no railing and SPV in 6 visits   Goal Outcome # 1 Progressing as expected  (HOB at 30 deg this visit)   Short Term Goal # 2 pt will perform sit <> stand with LRAD and SPV to improve mobility independence in 6 visits   Goal Outcome # 2 Progressing as expected   Short Term Goal # 3 pt will ambulate > 200 ft with LRAD and SPV to access community in 6 visits   Goal Outcome # 3 Progressing as expected   Short Term Goal # 4 Pt will negotiate 1 small stair without rails with SPV and LRAD in 6 visits to enter home   Goal Outcome # 4 Goal not met   Education Group   Education Provided Role of Physical Therapist;Gait Training;Use of Assistive Device   Role of Physical Therapist Patient Response Patient;Acceptance;Explanation;Verbal Demonstration    Gait Training Patient Response Patient;Acceptance;Explanation;Action Demonstration   Use of Assistive Device Patient Response Patient;Acceptance;Explanation;Action Demonstration   Additional Comments andominal precautions   Physical Therapy Treatment Plan   Physical Therapy Treatment Plan Continue Current Treatment Plan   Anticipated Discharge Equipment and Recommendations   DC Equipment Recommendations Front-Wheel Walker   Discharge Recommendations Recommend home health for continued physical therapy services   Interdisciplinary Plan of Care Collaboration   IDT Collaboration with  Nursing   Patient Position at End of Therapy In Bed;Call Light within Reach;Tray Table within Reach;Phone within Reach   Collaboration Comments RN updated   Session Information   Date / Session Number  3/2- 2 (2/4, 3/7)

## 2023-03-03 NOTE — PROGRESS NOTES
[facility] Internal Medicine Daily Progress Note    Date of Service  3/3/2023    [facility] Team: [facility] Team   Attending: Guilherme Pritchett M.d.  Senior Resident: Dr. Alondra Gonzales MD  Intern:  Dr. Roscoe Hagan DO   Contact Number: [phone]    Chief Complaint  63yoM with small bowel obstruction, POD #2 from exploratory laparotomy with lysis of adhesions to prior mesh hernia repair. Patient has history of recurrent SBO.    Hospital Course  Yonny Wallace is a 63 y.o. male with a history of recurrent small bowel obstructions with 3 in the past year and the most recent in September 2022 quiring multiple abdominal surgeries, large ventral hernia, atrial fibrillation on Pradaxa, HFmrEF 40-45% May 2022, TIA, CVA, HTN, MI, Sleep apnea, NIDDM2 A1c 6.5% May 2022 who presented 2/28/2023 as a transfer from [facility] who presented to the hospital for abdominal pain with CT scan demonstrating evidence of a small bowel obstruction with elevated white count.  She has had no flatus since 1400 yesterday and allowed a large meal around the same time yesterday.  In the past the patient has been recommended to go to [facility] to see a specialist as he needs an extensive abdominal wall reconstruction    Patient reports that he saw the surgeon at [facility] in November 2022 was recommended to lose 30 to 40 pounds prior to consideration for surgery; however, the patient reports he has gained about 20 pounds since then.  Reports he pulled his NG tube out in route to [facility] and has been vomiting without blood.  Reports his abdominal pain is about the same as at [place] at 8 out of 10.  Denies fever chills or any other symptoms.  Patient reports he last took Pradaxa at 3 PM on 2/27/2023.     WBC of 14, anion gap of 21, glucose 159, creatinine 1.8 (baseline 1.2), lipase 52, lactate 4.4. CT abdomen pelvis shows a large ventral abdominal hernia contains both tightly collapsed small bowel loops and fluid distended small bowel  loops with a transition point in the hernia sac.  This is consistent with a high-grade small bowel obstruction without perforation or abscess formation related to the hernia.  No mesenteric edema suspicion for bowel ischemia. Given 2 L of IV fluids at outside facility prior to transfer.    2/28/23 had ex lap and lysis of adhesions by general surgery. His chronic, recurrent incisional hernia s/p repair with mesh is unable to be repaired due to loss of abdominal domain and high risk of enterotomy    Patient tolerating full liquid diet without problems. Having regular BMs.    Interval Problem Update  - patient states that he had 2 loose BMs yesterday and is passing gas. No problems with urination  - tolerating CLD. Wants to go to full liquid  - Abdominal pain improving. No N/V  - not ready to go home today. Lives alone in an apt in [place]    I have discussed this patient's plan of care and discharge plan at IDT rounds today with Case Management, Nursing, Nursing leadership, and other members of the IDT team.    Consultants/Specialty  GI    Code Status  Full Code    Disposition  Patient is not medically cleared for discharge.   Anticipate discharge to to home with close outpatient follow-up.  I have placed the appropriate orders for post-discharge needs.    Review of Systems  Review of Systems   Constitutional:  Negative for chills and fever.   Respiratory:  Negative for cough and shortness of breath.    Cardiovascular:  Negative for chest pain and palpitations.   Gastrointestinal:  Positive for abdominal pain. Denies nausea and vomiting. Negative for constipation, diarrhea and heartburn.   Neurological:  Negative for dizziness, weakness and headaches.     Physical Exam  Temp:  [36.3 °C (97.3 °F)-37.2 °C (99 °F)] 36.3 °C (97.3 °F)  Pulse:  [73-89] 73  Resp:  [16] 16  BP: (131-185)/() 133/86  SpO2:  [94 %-96 %] 95 %    Vitals and nursing note reviewed.   Constitutional:       General: He is not in acute  distress.     Appearance: He is ill-appearing. He is not toxic-appearing or diaphoretic.   HENT:      Head: Normocephalic and atraumatic.      Mouth/Throat:      Mouth: Mucous membranes are dry.      Pharynx: No oropharyngeal exudate or posterior oropharyngeal erythema.   Eyes:      Conjunctiva/sclera: Conjunctivae normal.   Cardiovascular:      Rate and Rhythm: Normal rate and regular rhythm.      Pulses: Normal pulses.      Heart sounds: Normal heart sounds. No murmur heard.    No friction rub. No gallop.   Pulmonary:      Effort: Pulmonary effort is normal. No respiratory distress.      Breath sounds: Normal breath sounds. No stridor. No wheezing, rhonchi or rales.   Abdominal:      General: Abdomen is protuberant.      Tenderness: There is abdominal tenderness. There is no involuntary guarding or rebound. Midline incision without evidence of infection.     Comments: Large ventral hernia on right mid abdomen, well-healing but moderately tender vertical laparotomy incision with minimal drainage, NG tube in place. Bowel sounds diminished.  Musculoskeletal:      Cervical back: Neck supple. No tenderness.   Skin:     General: Skin is dry.      Findings: No lesion or rash.   Neurological:      General: No focal deficit present.      Mental Status: He is oriented to person, place, and time.   Psychiatric:         Mood and Affect: Mood normal.         Behavior: Behavior normal.     Fluids    Intake/Output Summary (Last 24 hours) at 3/3/2023 1250  Last data filed at 3/2/2023 1511  Gross per 24 hour   Intake 800 ml   Output --   Net 800 ml     Laboratory  Recent Labs     03/01/23  0350 03/02/23  0808   WBC 10.5 13.8*   RBC 4.60* 4.75   HEMOGLOBIN 14.7 15.3   HEMATOCRIT 45.0 46.0   MCV 97.8 96.8   MCH 32.0 32.2   MCHC 32.7* 33.3*   RDW 49.8 50.2*   PLATELETCT 314 276   MPV 10.9 10.7     Recent Labs     03/01/23  0350 03/02/23  0420   SODIUM 141 143   POTASSIUM 4.2 4.1   CHLORIDE 102 102   CO2 28 30   GLUCOSE 171* 169*    BUN 30* 25*   CREATININE 1.31 1.05   CALCIUM 9.3 9.7               Imaging  DX-SMALL BOWEL SERIES   Final Result         1.  Bowel distention without identified high-grade obstruction.   2.  Diverticulosis      OUTSIDE IMAGES-DX CHEST   Final Result      OUTSIDE IMAGES-CT ABDOMEN /PELVIS   Final Result         Scheduled Medications   Medication Dose Frequency    FLUoxetine  60 mg DAILY    gabapentin  300 mg TID    metoprolol tartrate  100 mg BID    oxyCODONE immediate-release  5 mg Q6HRS    terazosin  1 mg QDAY with Breakfast    terazosin  2 mg Q EVENING    thiamine  100 mg DAILY    tizanidine  1 mg DAILY    dabigatran  150 mg BID    amLODIPine  10 mg DAILY    insulin regular  1-6 Units Q6HRS      Assessment/Plan  Problem Representation:    * SBO (small bowel obstruction) ([facility])  Assessment & Plan  2/28/23 exploratory laparotomy with lysis of adhesions to prior mesh hernia repair. Exam notable for intermittent tachycardia (HR ), high BP (139/84 - 189/119), well-healing vertical laparotomy incision without any drainage evident. Bowel sounds are normal and he passed 2 large bowel movements overnight.   - On anti-emetics, home pain meds.  - Ceftriaxone 2g daily (2/28 - 3/5/2023), IV Metronidazole 500mg q8hrs (2/28 - 3/5/2023) discontinued yesterday.   - Small bowel follow-through (3/1/2023) showed no evidence of obstruction.  - NGT removed  - Advanced diet to full liquid      Type 2 diabetes mellitus, without long-term current use of insulin ([facility])  Assessment & Plan  A1c 6.5 in May 2022  -Insulin sliding scale hypoglycemia protocol and Accu-Cheks    Class 3 severe obesity due to excess calories with serious comorbidity and body mass index (BMI) of 45.0 to 49.9 in adult ([facility])  Assessment & Plan  Encourage weight loss.      (HFpEF) heart failure with preserved ejection fraction ([facility])  Assessment & Plan  Not on ACE-I/ARB. Home Metoprolol, Amlodipine resumed on 3/1/2023. Lasix still on hold.      Hyperglycemia  Assessment & Plan  Does have a history of non-insulin-dependent diabetes type 2 with A1c of 6.5% May 2022.  Suspect reactive though.  - Improving; will continue to monitor.      SUHAS (acute kidney injury) ([facility])  Assessment & Plan  Prerenal azotemia likely secondary to small bowel obstruction and fluid losses.  - Labs (3/1/2023) now show improving SUHAS (BUN 30, SCr 1.88->1.31; BL 1.2), likely prerenal etiology. No longer requiring fluids.     Leukocytosis  Assessment & Plan  May be reactive with no infection suspected at this time.   Not febrile does not appear septic so no indication for cultures.  - Resolved.    Elevated lactic acid level- (present on admission)  Assessment & Plan  Preliminary labs showed lactic acidosis (> 7.0 mg/dL).   - Improved to 2.1 mg/dL, 2/28/2023.     Restless leg syndrome- (present on admission)  Assessment & Plan  Hold ropinirole at this time    Depression- (present on admission)  Assessment & Plan  Home Fluoxetine resumed.     AF (atrial fibrillation) ([facility])- (present on admission)  Assessment & Plan  CHADSVASC 7.  Last took Pradaxa yesterday.  - hold Pradaxa and metoprolol  -keep Mg >2, K>4    Ventral hernia with obstruction and without gangrene- (present on admission)  Assessment & Plan  Demonstrated on CT scan at [facility]    Chronic back pain- (present on admission)  Assessment & Plan  Hold current pain medications at this time and will treat with IV medication    HEATHER (obstructive sleep apnea)- (present on admission)  Assessment & Plan  Continue CPAP during hospitalization    HTN (hypertension)- (present on admission)  Assessment & Plan  Elevated BP (161/113 - 190/113) over last 24 hours, likely exacerbated by post-surgical pain.   - Home antihypertensives and pain medications resumed.        VTE prophylaxis: therapeutic anticoagulation with Dabigatran    I have performed a physical exam and reviewed and updated ROS and Plan today (3/3/2023). In  review of yesterday's note (3/2/2023), there are no changes except as documented above.

## 2023-03-03 NOTE — DISCHARGE PLANNING
Met with patient, discussed HH choices and DME for FWW, patient did not have any preference for either, okay with any agency that covers [facility].   HH and DME choices sent to [facility].

## 2023-03-03 NOTE — HOSPITAL COURSE
Mr. Augusto Wallace is a 63-year-old male with history of recurrent small bowelobstructions (3 in last year, most recently in September 2022) requiring multiple abdominal surgeries, large ventral hernia (s/p mesh repair), atrial fibrillation (on Dabigatran), heart failure with reduced ejection fraction (LVEF 40-45%), sleep apnea, and diabetes mellitus type 2 admitted on 2/28/2023 as a transfer from [facility] for small bowel obstruction. He endorsed 1-day history of obstipation, associated with severe abdominal pain, rated 8/10.     [place] [facility] [place] a large ventral abdominal hernia containing tightly collapsed, fluid-distended small bowel loops with a transition point in the hernia sac, suggestive of a high-grade small bowel obstruction.     His abdominal pain worsened despite nasogastric decompression and judicious volume resuscitation. Labs showed worsening lactic acidosis, concerning for bowel ischemia. He was taken emergency to the operating room and underwent exploratory laparotomy with extensive lysis of adhesions. An overt point of obstruction was not visualized and the procedure had to be terminated early due to assessment that further surgical conduct would risk enterotomy.     He had significant abdominal distension with no passage of bowel movements or flatulence for 1 day after procedure. He therefore underwent small bowel follow-through, which showed no signs of continued obstruction. Afterward, he had several large bowel movements. Home medications were resumed and his diet was advanced to clear liquids and then full liquids prior to discharge. He is being sent home with home health ordered and will require outpatient follow up with PCP and General Surgery.

## 2023-03-03 NOTE — CARE PLAN
The patient is Stable - Low risk of patient condition declining or worsening    Shift Goals  Clinical Goals: Advance diet; remove NG tube  Patient Goals: Pain control; remove NG tube  Family Goals: not present    Progress made toward(s) clinical / shift goals:    Problem: Knowledge Deficit - Standard  Goal: Patient and family/care givers will demonstrate understanding of plan of care, disease process/condition, diagnostic tests and medications  Outcome: Progressing     Problem: Pain - Standard  Goal: Alleviation of pain or a reduction in pain to the patient’s comfort goal  Outcome: Progressing     Problem: Fall Risk  Goal: Patient will remain free from falls  Outcome: Progressing     Problem: Communication  Goal: The ability to communicate needs accurately and effectively will improve  Outcome: Progressing       Patient is not progressing towards the following goals:

## 2023-03-03 NOTE — CARE PLAN
The patient is Stable - Low risk of patient condition declining or worsening    Shift Goals  Clinical Goals: Pain control, rest, tolerated clear liquids  Patient Goals: Pain control, rest  Family Goals: Not present    Progress made toward(s) clinical / shift goals:    Problem: Knowledge Deficit - Standard  Goal: Patient and family/care givers will demonstrate understanding of plan of care, disease process/condition, diagnostic tests and medications  Outcome: Progressing  Note: Patient demonstrates an understanding of plan of care     Problem: Pain - Standard  Goal: Alleviation of pain or a reduction in pain to the patient’s comfort goal  Outcome: Progressing  Note: Patient still experiencing significant pain, calls appropriately for pharmacological interventions         Patient is not progressing towards the following goals:

## 2023-03-03 NOTE — PROGRESS NOTES
"Patient tolerating clear liquid diet at this time, stating he is feeling \"very little cramping.\"  Voided x2.   BM x2.   Report given to NOC RN.     "

## 2023-03-03 NOTE — DISCHARGE PLANNING
Received Choice form at [phone]  Agency/Facility Name: [facility]   Referral sent per Choice form @ [phone]

## 2023-03-03 NOTE — DISCHARGE PLANNING
FWW ordered for delivery to [facility], spoke to Carmen at [facility], Tel: [phone], she will look at referral and let CM know regarding acceptance.     2.24pm: Call from [facility], confirmed acceptance, contact number is [facility], Tel: [phone].

## 2023-03-03 NOTE — FACE TO FACE
Face to Face Note  -  Durable Medical Equipment    Liz Mi D.N.P. - NPI: [id number]  I certify that this patient is under my care and that they have had a durable medical equipment(DME)face to face encounter by myself that meets the physician DME face-to-face encounter requirements with this patient on:    Date of encounter:   Patient:                    MRN:                       YOB: 2025  Yonny Wallace  [id number]  1960     The encounter with the patient was in whole, or in part, for the following medical condition, which is the primary reason for durable medical equipment:  Post-Op Surgery    I certify that, based on my findings, the following durable medical equipment is medically necessary:  Walkers.        ------------------------------------------------------------------------------------------------------------------    Face to Face Supporting Documentation - Home Health    The encounter with this patient was in whole or in part the primary reason for home health admission.    Date of encounter:   Patient:                    MRN:                       YOB: 2025  Yonny Wallace  [id number]  1960     Home health to see patient for:  Skilled Nursing care for assessment, interventions & education, Physical Therapy evaluation and treatment, and Occupational therapy evaluation and treatment    Skilled need for:  Recent Deterioration of Health Status Post op surgery    Skilled nursing interventions to include:  Comment: to manage physical and occupational therapy    Homebound evidenced status by:  Need the aid of supportive devices such as crutches, canes, wheelchairs or walkers. Leaving home must require a considerable and taxing effort. There must exist a normal inability to leave the home.    Community Physician to provide follow up care: Carter Murphy M.D.     Optional Interventions    Wound information & treatment:    Home Infusion Therapy  orders:    Line/Drain/Airway:    I certify the face to face encounter for this home care referral meets the CMS requirements and the encounter/clinical assessment with the patient was, in whole, or in part, for the medical condition(s) listed above, which is the primary reason for home health care. Based on my clinical findings: the service(s) are medically necessary, support the need for home health care, and the homebound criteria are met.  I certify that this patient has had a face to face encounter by myself.  MALIK SchaeferN.P. - NPI: [id number]    *Debility, frailty and advanced age in the absence of an acute deterioration or exacerbation of a condition do not qualify a patient for home health.

## 2023-03-04 ENCOUNTER — APPOINTMENT (OUTPATIENT)
Dept: RADIOLOGY | Facility: MEDICAL CENTER | Age: 63
DRG: 327 | End: 2023-03-04
Attending: STUDENT IN AN ORGANIZED HEALTH CARE EDUCATION/TRAINING PROGRAM
Payer: MEDICARE

## 2023-03-04 VITALS
RESPIRATION RATE: 16 BRPM | HEART RATE: 74 BPM | DIASTOLIC BLOOD PRESSURE: 80 MMHG | OXYGEN SATURATION: 94 % | SYSTOLIC BLOOD PRESSURE: 153 MMHG | HEIGHT: 68 IN | TEMPERATURE: 97.9 F | BODY MASS INDEX: 45.77 KG/M2 | WEIGHT: 302 LBS

## 2023-03-04 LAB
GLUCOSE BLD STRIP.AUTO-MCNC: 122 MG/DL (ref 65–99)
GLUCOSE BLD STRIP.AUTO-MCNC: 122 MG/DL (ref 65–99)
GLUCOSE BLD STRIP.AUTO-MCNC: 78 MG/DL (ref 65–99)

## 2023-03-04 PROCEDURE — A9270 NON-COVERED ITEM OR SERVICE: HCPCS | Performed by: STUDENT IN AN ORGANIZED HEALTH CARE EDUCATION/TRAINING PROGRAM

## 2023-03-04 PROCEDURE — A9270 NON-COVERED ITEM OR SERVICE: HCPCS

## 2023-03-04 PROCEDURE — 700102 HCHG RX REV CODE 250 W/ 637 OVERRIDE(OP): Performed by: STUDENT IN AN ORGANIZED HEALTH CARE EDUCATION/TRAINING PROGRAM

## 2023-03-04 PROCEDURE — 82962 GLUCOSE BLOOD TEST: CPT

## 2023-03-04 PROCEDURE — 99239 HOSP IP/OBS DSCHRG MGMT >30: CPT | Mod: GC | Performed by: STUDENT IN AN ORGANIZED HEALTH CARE EDUCATION/TRAINING PROGRAM

## 2023-03-04 PROCEDURE — 700102 HCHG RX REV CODE 250 W/ 637 OVERRIDE(OP)

## 2023-03-04 PROCEDURE — 99024 POSTOP FOLLOW-UP VISIT: CPT

## 2023-03-04 RX ORDER — DAPAGLIFLOZIN 5 MG/1
5 TABLET, FILM COATED ORAL DAILY
Qty: 30 TABLET | Refills: 0 | Status: SHIPPED | OUTPATIENT
Start: 2023-03-04 | End: 2023-04-05 | Stop reason: SDUPTHER

## 2023-03-04 RX ORDER — AMLODIPINE BESYLATE 10 MG/1
10 TABLET ORAL DAILY
Qty: 30 TABLET | Refills: 0 | Status: SHIPPED | OUTPATIENT
Start: 2023-03-05 | End: 2023-04-05 | Stop reason: SDUPTHER

## 2023-03-04 RX ADMIN — OXYCODONE AND ACETAMINOPHEN 1 TABLET: 10; 325 TABLET ORAL at 03:19

## 2023-03-04 RX ADMIN — OXYCODONE HYDROCHLORIDE 5 MG: 5 TABLET ORAL at 10:48

## 2023-03-04 RX ADMIN — OXYCODONE HYDROCHLORIDE 5 MG: 5 TABLET ORAL at 06:04

## 2023-03-04 RX ADMIN — OXYCODONE HYDROCHLORIDE 5 MG: 5 TABLET ORAL at 00:09

## 2023-03-04 RX ADMIN — OXYCODONE AND ACETAMINOPHEN 1 TABLET: 10; 325 TABLET ORAL at 07:48

## 2023-03-04 RX ADMIN — THIAMINE HCL TAB 100 MG 100 MG: 100 TAB at 06:04

## 2023-03-04 RX ADMIN — OXYCODONE AND ACETAMINOPHEN 1 TABLET: 10; 325 TABLET ORAL at 13:05

## 2023-03-04 RX ADMIN — DABIGATRAN ETEXILATE MESYLATE 150 MG: 150 CAPSULE ORAL at 06:03

## 2023-03-04 RX ADMIN — TIZANIDINE 1 MG: 4 TABLET ORAL at 06:03

## 2023-03-04 RX ADMIN — FLUOXETINE 60 MG: 20 CAPSULE ORAL at 06:03

## 2023-03-04 RX ADMIN — TERAZOSIN 1 MG: 1 CAPSULE ORAL at 07:47

## 2023-03-04 RX ADMIN — METOPROLOL 100 MG: 100 TABLET ORAL at 06:03

## 2023-03-04 RX ADMIN — AMLODIPINE BESYLATE 10 MG: 10 TABLET ORAL at 06:04

## 2023-03-04 ASSESSMENT — PAIN DESCRIPTION - PAIN TYPE
TYPE: SURGICAL PAIN
TYPE: ACUTE PAIN;SURGICAL PAIN
TYPE: SURGICAL PAIN
TYPE: SURGICAL PAIN

## 2023-03-04 NOTE — PROGRESS NOTES
Assumed care of patient 0700. Received Report from [facility] nurse. Patient A&O 4, on 3L/NC at night and reports that wants to keep it in at this this time, Reporting a pain level of 7. Call light within reach, belongings within reach, Fall precautions in place, and bed alarm is on and bed in lowest position. Patient does not have any other needs at this time.     POC was discussed with patient. All questions were answered. Patient verbalized understanding.

## 2023-03-04 NOTE — CARE PLAN
Problem: Pain - Standard  Goal: Alleviation of pain or a reduction in pain to the patient’s comfort goal  Outcome: Progressing     Problem: Fall Risk  Goal: Patient will remain free from falls  Outcome: Progressing     The patient is Stable - Low risk of patient condition declining or worsening    Shift Goals  Clinical Goals: pain control  Patient Goals: mobilize  Family Goals: not present    Progress made toward(s) clinical / shift goals:  Pain controlled on orals. Pt having bowel movements every hour or so, mobilizing to bathroom steady gait w/FWW    Patient is not progressing towards the following goals:

## 2023-03-04 NOTE — PROGRESS NOTES
"    DATE: 3/4/2023    Post Operative Day 4 Exploratory laparotomy and lysis of adhesions    INTERVAL EVENTS:  Tolerating regular diet.  Multiple bowel movements.  Improved abdominal pain.      PHYSICAL EXAMINATION:  Vital Signs: BP (!) 153/80   Pulse 74   Temp 36.6 °C (97.9 °F) (Temporal)   Resp 16   Ht 1.727 m (5' 8\")   Wt (!) 137 kg (302 lb)   SpO2 94%     Soft abdomen. Appropriate surgical incisional pain, improving.  Midline incision with staples, well approximated with no signs of infection.      ASSESSMENT AND PLAN:   Encourage mobilization and incentive spirometer.  Staples out 3 weeks from surgery (approximately 3/21).  Recommend following up with Dr. Christensen for evaluation of the hernia.    No further surgical needs. Surgery team signing off. Please do not hesitate to contact the surgery team for any further surgical needs.      Discussed patient condition with RN, Patient, and general surgery, Dr. Gentile.    "

## 2023-03-04 NOTE — CARE PLAN
Problem: Fall Risk  Goal: Patient will remain free from falls  Outcome: Progressing     Problem: Communication  Goal: The ability to communicate needs accurately and effectively will improve  Outcome: Progressing       The patient is Stable - Low risk of patient condition declining or worsening    Shift Goals  Clinical Goals: Monitor labs, VS, blood sugars, pain control and safety  Patient Goals: Rest, pain control and mobilize  Family Goals: N/A    Progress made toward(s) clinical / shift goals:  Education given to patient on plan of care and with nursing care. Patient verbalizes understanding. Fall risk precautions in place, bed alarm in place, call light in reach and hourly rounding.     Patient is not progressing towards the following goals:

## 2023-03-04 NOTE — PROGRESS NOTES
Discharge Instructions    Discharged to home with [facility] as ordered, they will see patient tomorrow 3/5/23 by car with relative. Discharged via wheelchair, hospital escort: Yes.  Special equipment needed: FWW    Be sure to schedule a follow-up appointment with your primary care doctor or any specialists as instructed. Patient has appointments scheduled already with MD.    Discharge Plan:   Diet Plan: Discussed  Activity Level: Discussed  Confirmed Follow up Appointment: Patient to Call and Schedule Appointment  Confirmed Symptoms Management: Discussed  Medication Reconciliation Updated: Yes  Influenza Vaccine Indication: Patient Refuses    I understand that a diet low in cholesterol, fat, and sodium is recommended for good health. Unless I have been given specific instructions below for another diet, I accept this instruction as my diet prescription.   Other diet: Regular    Special Instructions: None    -Is this patient being discharged with medication to prevent blood clots?  No    Is patient discharged on Warfarin / Coumadin?   No   Discharge instructions completed, daughter at bedside and both verbalize understanding of instructions.

## 2023-03-21 NOTE — DOCUMENTATION QUERY
"                                                                         Formerly [facility], later [facility]                                                                       Query Response Note      PATIENT:               SANTOSH RODARTE  ACCT #:                  [id number]  MRN:                     [id number]  :                      1960  ADMIT DATE:       2023 2:20 AM  DISCH DATE:        3/4/2023 4:23 PM  RESPONDING  PROVIDER #:        [id number]           QUERY TEXT:    Lysis of adhesions is documented in the Operative Report. Can this procedure of lysis of adhesions be further specified?    The patient's Clinical Indicators include:  64yo with dx of chronic recurrent ventral incisional hernia with obstructive symptoms, peritoneal adhesions     Op report \" Extensive adhesions to the multiple prior mesh hernia repairs in the anterior abdominal wall.  Extensive interloop small bowel to small bowel adhesions.\"   Op report \"Extensive lysis of adhesions was carried out in a circumferential manner and attempted delineate the native fascia\"    Risk factors: advanced age, multiple prior mesh hernia repairs in the anterior abdominal wall, extensive adhesions  Treatments: exploratory laparotomy and lysis of adhesions, labwork, imaging, monitoring    Contact me with questions.     Thank you,  Sapna Echeverria, KERONP, [email or web address]  Options provided:   -- Lysis of adhesions of small bowel   -- Lysis of adhesions of stomach   -- Other explanation:Other explanation-, please specify   -- Unable to determine      Query created by: Sapna Echeverria on 3/20/2023 4:24 AM    RESPONSE TEXT:    Other explanation:Other explanation- Small bowel, colon, abdominal wall, and previously placed prosthetic mesh.          Electronically signed by:  KEV JENSEN MD 3/21/2023 10:32 AM              "

## 2023-03-26 NOTE — DISCHARGE SUMMARY
[facility] Discharge Summary    Attending: Guilherme Pritchett MD  Senior Resident: Alondra Gonzales MD  Intern: Roscoe Hagan DO   Contact Number: [phone]    CHIEF COMPLAINT ON ADMISSION  Abdominal pain     Reason for Admission  SBO     Admission Date  2/28/2023    CODE STATUS  Full Code    HPI & HOSPITAL COURSE  Mr. Augusto Wallace is a 63-year-old male with history of recurrent small bowelobstructions (3 in last year, most recently in September 2022) requiring multiple abdominal surgeries, large ventral hernia (s/p mesh repair), atrial fibrillation (on Dabigatran), heart failure with reduced ejection fraction (LVEF 40-45%), sleep apnea, and diabetes mellitus type 2 admitted on 2/28/2023 as a transfer from [facility] for small bowel obstruction. He endorsed 1-day history of obstipation, associated with severe abdominal pain, rated 8/10.     Abdominal CT at [facility] showed a large ventral abdominal hernia containing tightly collapsed, fluid-distended small bowel loops with a transition point in the hernia sac, suggestive of a high-grade small bowel obstruction.     His abdominal pain worsened despite nasogastric decompression and judicious volume resuscitation. Labs showed worsening lactic acidosis, concerning for bowel ischemia. He was taken emergency to the operating room and underwent exploratory laparotomy with extensive lysis of adhesions. An overt point of obstruction was not visualized and the procedure had to be terminated early due to assessment that further surgical conduct would risk enterotomy.     He had significant abdominal distension with no passage of bowel movements or flatulence for 1 day after procedure. He therefore underwent small bowel follow-through, which showed no signs of continued obstruction. Afterward, he had several large bowel movements. Home medications were resumed and his diet was advanced to clear liquids and then full liquids prior to discharge. He is being  sent home with home health ordered and will require outpatient follow up with PCP and General Surgery.    Therefore, he is discharged in fair and stable condition to home with organized home healthcare and close outpatient follow-up.    The patient met 2-midnight criteria for an inpatient stay at the time of discharge.    Discharge Date  3/4/2023    Physical Exam on Day of Discharge  Constitutional:       General: He is not in acute distress.     Appearance: He is not toxic-appearing or diaphoretic.   HENT:      Head: Normocephalic and atraumatic.      Mouth/Throat:      Mouth: Mucous membranes are dry.      Pharynx: No oropharyngeal exudate or posterior oropharyngeal erythema.   Eyes:      Conjunctiva/sclera: Conjunctivae normal.   Cardiovascular:      Rate and Rhythm: Normal rate and regular rhythm.      Pulses: Normal pulses.      Heart sounds: Normal heart sounds. No murmur heard.    No friction rub. No gallop.   Pulmonary:      Effort: Pulmonary effort is normal. No respiratory distress.      Breath sounds: Normal breath sounds. No stridor. No wheezing, rhonchi or rales.   Abdominal:      General: Abdomen is protuberant.      Tenderness: There is abdominal tenderness. There is no involuntary guarding or rebound. Midline incision without evidence of infection.     Comments: Large ventral hernia on right mid abdomen, well-healing but moderately tender vertical laparotomy incision with minimal drainage, NG tube in place. Bowel sounds normal.  Musculoskeletal:      Cervical back: Neck supple. No tenderness.   Skin:     General: Skin is dry.      Findings: No lesion or rash.   Neurological:      General: No focal deficit present.      Mental Status: He is oriented to person, place, and time.   Psychiatric:         Mood and Affect: Mood normal.         Behavior: Behavior normal.     FOLLOW UP ITEMS POST DISCHARGE  We are sending you home with an incentive spirometer to improve lung expansion and prevent pneumonia.    Staples may be removed in 3 weeks (around 3/21/2023).   We have arranged for you to follow up with General Surgery (Lonnie Christensen MD) for outpatient evaluation of your hernia.    DISCHARGE DIAGNOSES  Principal Problem:    SBO (small bowel obstruction) ([facility]) POA: Unknown  Active Problems:    HTN (hypertension) POA: Yes    HEATHER (obstructive sleep apnea) POA: Yes    Chronic back pain POA: Yes    Ventral hernia with obstruction and without gangrene POA: Yes      Overview: ICD-10 transition    AF (atrial fibrillation) ([facility]) POA: Yes    Depression POA: Yes    Restless leg syndrome POA: Yes    Elevated lactic acid level POA: Yes    Leukocytosis POA: Unknown    SUHAS (acute kidney injury) ([facility]) POA: Unknown    Hyperglycemia POA: Unknown    Ventral hernia with bowel obstruction POA: Yes    (HFpEF) heart failure with preserved ejection fraction ([facility]) POA: Unknown    Class 3 severe obesity due to excess calories with serious comorbidity and body mass index (BMI) of 45.0 to 49.9 in adult ([facility]) POA: Unknown    Type 2 diabetes mellitus, without long-term current use of insulin ([facility]) POA: Unknown  Resolved Problems:    * No resolved hospital problems. *      FOLLOW UP  Future Appointments   Date Time Provider Department Center   4/5/2023 10:00 AM Carter Murphy M.D. [facility]  [id number]  [facility] [address]  [phone]    Follow up  Call to make an appointment to be seen by Dr. Christensen in 1-2 weeks from discharge for further evaluation of your hernia.    Carter Murphy M.D.  [address] [facility] [address] [phone]    Follow up        MEDICATIONS ON DISCHARGE     Medication List        START taking these medications        Instructions   dapagliflozin propanediol 5 MG Tabs  Commonly known as: Farxiga   Take 1 Tablet by mouth every day.  Dose: 5 mg            CHANGE how you take these medications        Instructions   amLODIPine 10 MG Tabs  What changed:   medication  strength  how much to take  Commonly known as: NORVASC   Take 1 Tablet by mouth every day.  Dose: 10 mg            CONTINUE taking these medications        Instructions   albuterol 108 (90 Base) MCG/ACT Aers inhalation aerosol   INHALE TWO PUFFS BY MOUTH EVERY 6 HOURS AS NEEDED FOR SHORTNESS OF BREATH - taking more often lately     Blood Glucose Test Strips   Test strips for Freestyle meter. Use to test blood sugars 2-3 times daily, Diagnosis code E11.9     CareTouch 2 CPAP Hose  Misc   CPAP     diphenhydrAMINE 25 MG Tabs  Commonly known as: BENADRYL   Take 50 mg by mouth every 6 hours as needed.  Dose: 50 mg     EPINEPHrine 0.3 MG/0.3ML Soaj solution for injection  Commonly known as: EPIPEN   Inject 0.3 mL into the shoulder, thigh, or buttocks as needed.  Dose: 0.3 mg     FLUoxetine 20 MG Caps  Commonly known as: PROZAC   TAKE THREE CAPSULES BY MOUTH EVERY MORNING     furosemide 20 MG Tabs  Commonly known as: LASIX   2 tablets daily in am     gabapentin 300 MG Caps  Commonly known as: NEURONTIN   Take 1 Capsule by mouth 3 times a day.  Dose: 300 mg     hydrocortisone 2.5 % Crea topical cream   2 times daily as needed     hydrocortisone rectal 2.5% Crea  Commonly known as: Perianal   APPLY TO AFFECTED AREA(S) TWO TIMES A DAY AS DIRECTED     Lancets   Lancets for Freestyle meter. Use to test blood sugars 2-3 times daily, Diagnosis code E11.9     metoprolol tartrate 100 MG Tabs  Commonly known as: LOPRESSOR   Take 1 Tablet by mouth 2 times a day.  Dose: 100 mg     Movantik 25 MG Tabs  Generic drug: Naloxegol Oxalate   TAKE ONE TABLET BY MOUTH OR FEEDING TUBE DAILY     oxyCODONE-acetaminophen  MG Tabs  Commonly known as: PERCOCET-10   Take 1 Tab by mouth every four hours as needed for Severe Pain.  Dose: 1 Tablet     OXYCONTIN PO   Take 15 mg by mouth 2 Times a Day.  Dose: 15 mg     polyethylene glycol/lytes 17 g Pack  Commonly known as: MIRALAX   Take 1 Packet by mouth every day.  Dose: 17 g     potassium  chloride 10 MEQ capsule  Commonly known as: MICRO-K   Take 1 capsule by mouth 2 times a day.  Dose: 10 mEq     Pradaxa 150 MG Caps capsule  Generic drug: dabigatran   TAKE ONE CAPSULE BY MOUTH TWICE A DAY     pravastatin 40 MG tablet  Commonly known as: PRAVACHOL   TAKE ONE TABLET BY MOUTH EVERY EVENING     * ROPINIRole 1 MG Tabs  Commonly known as: REQUIP   1 tab at hs with an increasing amount of the 0.25 mg tablets as directed     * ROPINIRole 0.25 MG Tabs  Commonly known as: REQUIP   TAKE 3 TO 7 TABLETS BY MOUTH AT BEDTIME WITH 1 MG TABLET AS DICTATED BY SYMPTOMS     terazosin 1 MG Caps  Commonly known as: HYTRIN   TAKE ONE CAPSULE BY MOUTH EVERY MORNING AND TAKE TWO CAPSULES EVERY NIGHT AT BEDTIME     thiamine 100 MG tablet  Commonly known as: THIAMINE   Take 1 tablet by mouth every day.  Dose: 100 mg     tizanidine 2 MG tablet  Commonly known as: ZANAFLEX   Take 1 mg by mouth every day.  Dose: 1 mg           * This list has 2 medication(s) that are the same as other medications prescribed for you. Read the directions carefully, and ask your doctor or other care provider to review them with you.                  Allergies  Allergies   Allergen Reactions    Bee Venom Anaphylaxis    Morphine Itching       DIET  No orders of the defined types were placed in this encounter.      ACTIVITY  As tolerated and directed by rehab.  Weight bearing as tolerated    CONSULTATIONS  General surgery, physical therapy, occupational therapy.     PROCEDURES  Exploratory laparotomy and lysis of adhesions (2/28/2023).     LABORATORY  Lab Results   Component Value Date    SODIUM 143 03/02/2023    POTASSIUM 4.1 03/02/2023    CHLORIDE 102 03/02/2023    CO2 30 03/02/2023    GLUCOSE 169 (H) 03/02/2023    BUN 25 (H) 03/02/2023    CREATININE 1.05 03/02/2023    GLOMRATE 74 07/29/2022        Lab Results   Component Value Date    WBC 13.8 (H) 03/02/2023    HEMOGLOBIN 15.3 03/02/2023    HEMATOCRIT 46.0 03/02/2023    PLATELETCT 276 03/02/2023       Total time of the discharge process exceeds 45 minutes.

## 2023-04-30 ENCOUNTER — HOSPITAL ENCOUNTER (OUTPATIENT)
Dept: RADIOLOGY | Facility: MEDICAL CENTER | Age: 63
End: 2023-04-30

## 2023-04-30 ENCOUNTER — HOSPITAL ENCOUNTER (INPATIENT)
Facility: MEDICAL CENTER | Age: 63
LOS: 3 days | DRG: 388 | End: 2023-05-03
Attending: EMERGENCY MEDICINE | Admitting: HOSPITALIST
Payer: MEDICARE

## 2023-04-30 DIAGNOSIS — K56.609 SBO (SMALL BOWEL OBSTRUCTION) (HCC): ICD-10-CM

## 2023-04-30 PROBLEM — J96.01 ACUTE RESPIRATORY FAILURE WITH HYPOXIA (HCC): Status: ACTIVE | Noted: 2023-04-30

## 2023-04-30 LAB
ALBUMIN SERPL BCP-MCNC: 4.6 G/DL (ref 3.2–4.9)
ALBUMIN/GLOB SERPL: 1.4 G/DL
ALP SERPL-CCNC: 63 U/L (ref 30–99)
ALT SERPL-CCNC: 27 U/L (ref 2–50)
ANION GAP SERPL CALC-SCNC: 15 MMOL/L (ref 7–16)
AST SERPL-CCNC: 34 U/L (ref 12–45)
BASOPHILS # BLD AUTO: 0.3 % (ref 0–1.8)
BASOPHILS # BLD: 0.05 K/UL (ref 0–0.12)
BILIRUB SERPL-MCNC: 1.5 MG/DL (ref 0.1–1.5)
BUN SERPL-MCNC: 38 MG/DL (ref 8–22)
CALCIUM ALBUM COR SERPL-MCNC: 9.2 MG/DL (ref 8.5–10.5)
CALCIUM SERPL-MCNC: 9.7 MG/DL (ref 8.5–10.5)
CHLORIDE SERPL-SCNC: 102 MMOL/L (ref 96–112)
CO2 SERPL-SCNC: 22 MMOL/L (ref 20–33)
CREAT SERPL-MCNC: 2.14 MG/DL (ref 0.5–1.4)
EKG IMPRESSION: NORMAL
EOSINOPHIL # BLD AUTO: 0 K/UL (ref 0–0.51)
EOSINOPHIL NFR BLD: 0 % (ref 0–6.9)
ERYTHROCYTE [DISTWIDTH] IN BLOOD BY AUTOMATED COUNT: 48.3 FL (ref 35.9–50)
GFR SERPLBLD CREATININE-BSD FMLA CKD-EPI: 33 ML/MIN/1.73 M 2
GLOBULIN SER CALC-MCNC: 3.4 G/DL (ref 1.9–3.5)
GLUCOSE BLD STRIP.AUTO-MCNC: 174 MG/DL (ref 65–99)
GLUCOSE BLD STRIP.AUTO-MCNC: 179 MG/DL (ref 65–99)
GLUCOSE BLD STRIP.AUTO-MCNC: 192 MG/DL (ref 65–99)
GLUCOSE SERPL-MCNC: 187 MG/DL (ref 65–99)
HCT VFR BLD AUTO: 47 % (ref 42–52)
HGB BLD-MCNC: 15.4 G/DL (ref 14–18)
IMM GRANULOCYTES # BLD AUTO: 0.12 K/UL (ref 0–0.11)
IMM GRANULOCYTES NFR BLD AUTO: 0.6 % (ref 0–0.9)
LACTATE SERPL-SCNC: 1.8 MMOL/L (ref 0.5–2)
LIPASE SERPL-CCNC: 37 U/L (ref 11–82)
LYMPHOCYTES # BLD AUTO: 0.82 K/UL (ref 1–4.8)
LYMPHOCYTES NFR BLD: 4.3 % (ref 22–41)
MAGNESIUM SERPL-MCNC: 2.4 MG/DL (ref 1.5–2.5)
MCH RBC QN AUTO: 31.8 PG (ref 27–33)
MCHC RBC AUTO-ENTMCNC: 32.8 G/DL (ref 33.7–35.3)
MCV RBC AUTO: 97.1 FL (ref 81.4–97.8)
MONOCYTES # BLD AUTO: 1.32 K/UL (ref 0–0.85)
MONOCYTES NFR BLD AUTO: 7 % (ref 0–13.4)
NEUTROPHILS # BLD AUTO: 16.55 K/UL (ref 1.82–7.42)
NEUTROPHILS NFR BLD: 87.8 % (ref 44–72)
NRBC # BLD AUTO: 0.02 K/UL
NRBC BLD-RTO: 0.1 /100 WBC
PLATELET # BLD AUTO: 317 K/UL (ref 164–446)
PMV BLD AUTO: 10.2 FL (ref 9–12.9)
POTASSIUM SERPL-SCNC: 4.6 MMOL/L (ref 3.6–5.5)
PROT SERPL-MCNC: 8 G/DL (ref 6–8.2)
RBC # BLD AUTO: 4.84 M/UL (ref 4.7–6.1)
SODIUM SERPL-SCNC: 139 MMOL/L (ref 135–145)
WBC # BLD AUTO: 18.9 K/UL (ref 4.8–10.8)

## 2023-04-30 PROCEDURE — 700105 HCHG RX REV CODE 258: Performed by: EMERGENCY MEDICINE

## 2023-04-30 PROCEDURE — 700111 HCHG RX REV CODE 636 W/ 250 OVERRIDE (IP): Performed by: HOSPITALIST

## 2023-04-30 PROCEDURE — 700111 HCHG RX REV CODE 636 W/ 250 OVERRIDE (IP): Performed by: SURGERY

## 2023-04-30 PROCEDURE — 99223 1ST HOSP IP/OBS HIGH 75: CPT | Mod: AI | Performed by: HOSPITALIST

## 2023-04-30 PROCEDURE — 82962 GLUCOSE BLOOD TEST: CPT | Mod: 91

## 2023-04-30 PROCEDURE — 85025 COMPLETE CBC W/AUTO DIFF WBC: CPT

## 2023-04-30 PROCEDURE — 80053 COMPREHEN METABOLIC PANEL: CPT

## 2023-04-30 PROCEDURE — 700111 HCHG RX REV CODE 636 W/ 250 OVERRIDE (IP)

## 2023-04-30 PROCEDURE — 99285 EMERGENCY DEPT VISIT HI MDM: CPT

## 2023-04-30 PROCEDURE — 96375 TX/PRO/DX INJ NEW DRUG ADDON: CPT

## 2023-04-30 PROCEDURE — 700111 HCHG RX REV CODE 636 W/ 250 OVERRIDE (IP): Performed by: EMERGENCY MEDICINE

## 2023-04-30 PROCEDURE — 93010 ELECTROCARDIOGRAM REPORT: CPT | Performed by: INTERNAL MEDICINE

## 2023-04-30 PROCEDURE — 93005 ELECTROCARDIOGRAM TRACING: CPT

## 2023-04-30 PROCEDURE — 83735 ASSAY OF MAGNESIUM: CPT

## 2023-04-30 PROCEDURE — 99284 EMERGENCY DEPT VISIT MOD MDM: CPT | Performed by: SURGERY

## 2023-04-30 PROCEDURE — 96365 THER/PROPH/DIAG IV INF INIT: CPT

## 2023-04-30 PROCEDURE — 83605 ASSAY OF LACTIC ACID: CPT

## 2023-04-30 PROCEDURE — C9399 UNCLASSIFIED DRUGS OR BIOLOG: HCPCS | Performed by: EMERGENCY MEDICINE

## 2023-04-30 PROCEDURE — 700102 HCHG RX REV CODE 250 W/ 637 OVERRIDE(OP): Performed by: HOSPITALIST

## 2023-04-30 PROCEDURE — 770020 HCHG ROOM/CARE - TELE (206)

## 2023-04-30 PROCEDURE — 93005 ELECTROCARDIOGRAM TRACING: CPT | Performed by: HOSPITALIST

## 2023-04-30 PROCEDURE — 83690 ASSAY OF LIPASE: CPT

## 2023-04-30 PROCEDURE — 36415 COLL VENOUS BLD VENIPUNCTURE: CPT

## 2023-04-30 PROCEDURE — 700105 HCHG RX REV CODE 258: Performed by: SURGERY

## 2023-04-30 RX ORDER — MAGNESIUM SULFATE HEPTAHYDRATE 40 MG/ML
2 INJECTION, SOLUTION INTRAVENOUS ONCE
Status: COMPLETED | OUTPATIENT
Start: 2023-04-30 | End: 2023-04-30

## 2023-04-30 RX ORDER — OXYCODONE HYDROCHLORIDE 15 MG/1
15 TABLET, FILM COATED, EXTENDED RELEASE ORAL 2 TIMES DAILY
COMMUNITY

## 2023-04-30 RX ORDER — ONDANSETRON 4 MG/1
4 TABLET, ORALLY DISINTEGRATING ORAL EVERY 4 HOURS PRN
Status: DISCONTINUED | OUTPATIENT
Start: 2023-04-30 | End: 2023-05-03 | Stop reason: HOSPADM

## 2023-04-30 RX ORDER — METOPROLOL TARTRATE 1 MG/ML
5 INJECTION, SOLUTION INTRAVENOUS
Status: DISCONTINUED | OUTPATIENT
Start: 2023-04-30 | End: 2023-05-03 | Stop reason: HOSPADM

## 2023-04-30 RX ORDER — GABAPENTIN 300 MG/1
300 CAPSULE ORAL 2 TIMES DAILY
COMMUNITY
End: 2024-02-27 | Stop reason: SDUPTHER

## 2023-04-30 RX ORDER — HYDROMORPHONE HYDROCHLORIDE 1 MG/ML
0.5 INJECTION, SOLUTION INTRAMUSCULAR; INTRAVENOUS; SUBCUTANEOUS ONCE
Status: COMPLETED | OUTPATIENT
Start: 2023-04-30 | End: 2023-04-30

## 2023-04-30 RX ORDER — OXYCODONE HYDROCHLORIDE 5 MG/1
5 TABLET ORAL
Status: DISCONTINUED | OUTPATIENT
Start: 2023-04-30 | End: 2023-05-01

## 2023-04-30 RX ORDER — HYDRALAZINE HYDROCHLORIDE 20 MG/ML
10 INJECTION INTRAMUSCULAR; INTRAVENOUS EVERY 4 HOURS PRN
Status: DISCONTINUED | OUTPATIENT
Start: 2023-04-30 | End: 2023-05-03 | Stop reason: HOSPADM

## 2023-04-30 RX ORDER — OXYCODONE HYDROCHLORIDE 10 MG/1
10 TABLET ORAL
Status: DISCONTINUED | OUTPATIENT
Start: 2023-04-30 | End: 2023-05-01

## 2023-04-30 RX ORDER — HYDROMORPHONE HYDROCHLORIDE 1 MG/ML
0.5 INJECTION, SOLUTION INTRAMUSCULAR; INTRAVENOUS; SUBCUTANEOUS ONCE
Status: ACTIVE | OUTPATIENT
Start: 2023-04-30 | End: 2023-05-01

## 2023-04-30 RX ORDER — ONDANSETRON 2 MG/ML
4 INJECTION INTRAMUSCULAR; INTRAVENOUS EVERY 4 HOURS PRN
Status: DISCONTINUED | OUTPATIENT
Start: 2023-04-30 | End: 2023-05-03 | Stop reason: HOSPADM

## 2023-04-30 RX ORDER — SODIUM CHLORIDE 9 MG/ML
INJECTION, SOLUTION INTRAVENOUS CONTINUOUS
Status: DISCONTINUED | OUTPATIENT
Start: 2023-04-30 | End: 2023-05-03 | Stop reason: HOSPADM

## 2023-04-30 RX ORDER — HYDROMORPHONE HYDROCHLORIDE 1 MG/ML
0.5 INJECTION, SOLUTION INTRAMUSCULAR; INTRAVENOUS; SUBCUTANEOUS
Status: DISCONTINUED | OUTPATIENT
Start: 2023-04-30 | End: 2023-05-01

## 2023-04-30 RX ADMIN — MAGNESIUM SULFATE HEPTAHYDRATE 2 G: 40 INJECTION, SOLUTION INTRAVENOUS at 06:14

## 2023-04-30 RX ADMIN — HYDROMORPHONE HYDROCHLORIDE 0.5 MG: 1 INJECTION, SOLUTION INTRAMUSCULAR; INTRAVENOUS; SUBCUTANEOUS at 13:53

## 2023-04-30 RX ADMIN — IDARUCIZUMAB 2.5 G: 50 INJECTION INTRAVENOUS at 05:02

## 2023-04-30 RX ADMIN — ONDANSETRON HYDROCHLORIDE 4 MG: 2 SOLUTION INTRAMUSCULAR; INTRAVENOUS at 07:50

## 2023-04-30 RX ADMIN — PIPERACILLIN AND TAZOBACTAM 4.5 G: 4; .5 INJECTION, POWDER, LYOPHILIZED, FOR SOLUTION INTRAVENOUS; PARENTERAL at 22:03

## 2023-04-30 RX ADMIN — IDARUCIZUMAB 2.5 G: 50 INJECTION INTRAVENOUS at 05:06

## 2023-04-30 RX ADMIN — ONDANSETRON HYDROCHLORIDE 4 MG: 2 SOLUTION INTRAMUSCULAR; INTRAVENOUS at 10:37

## 2023-04-30 RX ADMIN — ONDANSETRON HYDROCHLORIDE 4 MG: 2 SOLUTION INTRAMUSCULAR; INTRAVENOUS at 22:01

## 2023-04-30 RX ADMIN — PIPERACILLIN AND TAZOBACTAM 4.5 G: 4; .5 INJECTION, POWDER, LYOPHILIZED, FOR SOLUTION INTRAVENOUS; PARENTERAL at 07:55

## 2023-04-30 RX ADMIN — HYDROMORPHONE HYDROCHLORIDE 0.5 MG: 1 INJECTION, SOLUTION INTRAMUSCULAR; INTRAVENOUS; SUBCUTANEOUS at 21:10

## 2023-04-30 RX ADMIN — HYDROMORPHONE HYDROCHLORIDE 0.5 MG: 1 INJECTION, SOLUTION INTRAMUSCULAR; INTRAVENOUS; SUBCUTANEOUS at 04:01

## 2023-04-30 RX ADMIN — ONDANSETRON HYDROCHLORIDE 4 MG: 2 SOLUTION INTRAMUSCULAR; INTRAVENOUS at 17:52

## 2023-04-30 RX ADMIN — HYDROMORPHONE HYDROCHLORIDE 0.5 MG: 1 INJECTION, SOLUTION INTRAMUSCULAR; INTRAVENOUS; SUBCUTANEOUS at 17:52

## 2023-04-30 RX ADMIN — HYDROMORPHONE HYDROCHLORIDE 0.5 MG: 1 INJECTION, SOLUTION INTRAMUSCULAR; INTRAVENOUS; SUBCUTANEOUS at 10:37

## 2023-04-30 RX ADMIN — ONDANSETRON HYDROCHLORIDE 4 MG: 2 SOLUTION INTRAMUSCULAR; INTRAVENOUS at 13:50

## 2023-04-30 RX ADMIN — SODIUM CHLORIDE: 9 INJECTION, SOLUTION INTRAVENOUS at 03:08

## 2023-04-30 RX ADMIN — HYDROMORPHONE HYDROCHLORIDE 0.5 MG: 1 INJECTION, SOLUTION INTRAMUSCULAR; INTRAVENOUS; SUBCUTANEOUS at 07:51

## 2023-04-30 RX ADMIN — PIPERACILLIN AND TAZOBACTAM 4.5 G: 4; .5 INJECTION, POWDER, LYOPHILIZED, FOR SOLUTION INTRAVENOUS; PARENTERAL at 09:33

## 2023-04-30 ASSESSMENT — ENCOUNTER SYMPTOMS
COUGH: 0
NAUSEA: 1
WEAKNESS: 0
PHOTOPHOBIA: 0
DIARRHEA: 0
DOUBLE VISION: 0
WHEEZING: 0
CHILLS: 0
BACK PAIN: 0
DIZZINESS: 0
POLYDIPSIA: 0
SINUS PAIN: 0
EYE PAIN: 0
NECK PAIN: 0
HEMOPTYSIS: 0
DIAPHORESIS: 0
BLURRED VISION: 0
FALLS: 0
BLOOD IN STOOL: 0
VOMITING: 1
HEADACHES: 0
PND: 0
TREMORS: 0
CONSTIPATION: 0
SORE THROAT: 0
MYALGIAS: 0
ORTHOPNEA: 0
DEPRESSION: 0
SHORTNESS OF BREATH: 1
HEARTBURN: 0
CLAUDICATION: 0
ABDOMINAL PAIN: 1
STRIDOR: 0
FLANK PAIN: 0
HALLUCINATIONS: 0
PALPITATIONS: 0
FEVER: 0
TINGLING: 0
SPUTUM PRODUCTION: 0
BRUISES/BLEEDS EASILY: 0

## 2023-04-30 ASSESSMENT — COGNITIVE AND FUNCTIONAL STATUS - GENERAL
TOILETING: A LITTLE
DAILY ACTIVITIY SCORE: 20
HELP NEEDED FOR BATHING: A LITTLE
SUGGESTED CMS G CODE MODIFIER MOBILITY: CK
STANDING UP FROM CHAIR USING ARMS: A LITTLE
MOVING FROM LYING ON BACK TO SITTING ON SIDE OF FLAT BED: A LITTLE
MOBILITY SCORE: 19
DRESSING REGULAR UPPER BODY CLOTHING: A LITTLE
SUGGESTED CMS G CODE MODIFIER DAILY ACTIVITY: CJ
WALKING IN HOSPITAL ROOM: A LITTLE
DRESSING REGULAR LOWER BODY CLOTHING: A LITTLE
MOVING TO AND FROM BED TO CHAIR: A LITTLE
CLIMB 3 TO 5 STEPS WITH RAILING: A LITTLE

## 2023-04-30 ASSESSMENT — LIFESTYLE VARIABLES
CONSUMPTION TOTAL: POSITIVE
HOW MANY TIMES IN THE PAST YEAR HAVE YOU HAD 5 OR MORE DRINKS IN A DAY: 5
EVER FELT BAD OR GUILTY ABOUT YOUR DRINKING: NO
ALCOHOL_USE: NO
ON A TYPICAL DAY WHEN YOU DRINK ALCOHOL HOW MANY DRINKS DO YOU HAVE: 2
AVERAGE NUMBER OF DAYS PER WEEK YOU HAVE A DRINK CONTAINING ALCOHOL: 14
TOTAL SCORE: 0
DOES PATIENT WANT TO STOP DRINKING: NO
SUBSTANCE_ABUSE: 0
TOTAL SCORE: 0
EVER HAD A DRINK FIRST THING IN THE MORNING TO STEADY YOUR NERVES TO GET RID OF A HANGOVER: NO
HAVE PEOPLE ANNOYED YOU BY CRITICIZING YOUR DRINKING: NO
HAVE YOU EVER FELT YOU SHOULD CUT DOWN ON YOUR DRINKING: NO
TOTAL SCORE: 0

## 2023-04-30 ASSESSMENT — PATIENT HEALTH QUESTIONNAIRE - PHQ9
1. LITTLE INTEREST OR PLEASURE IN DOING THINGS: NOT AT ALL
SUM OF ALL RESPONSES TO PHQ9 QUESTIONS 1 AND 2: 0
2. FEELING DOWN, DEPRESSED, IRRITABLE, OR HOPELESS: NOT AT ALL

## 2023-04-30 ASSESSMENT — PAIN DESCRIPTION - PAIN TYPE
TYPE: ACUTE PAIN;CHRONIC PAIN

## 2023-04-30 ASSESSMENT — FIBROSIS 4 INDEX: FIB4 SCORE: 1.48

## 2023-04-30 NOTE — ASSESSMENT & PLAN NOTE
CT with small bowel obstruction and portal venous gas.    Lactate has corrected with fluid resuscitation.   Reverse anticoagulation, NG decompression and NPO.    Patient is high risk for surgery should he fail to resolve his obstruction.  Unclear if compromised bowel in the face of normalized lactate.  Abx for portal venous gas and pneumatosis. Close observation.  5/1 Nausea improved, passing flatus.  D/C G tube, start clears.  5/2 Full liquid diet.  5/3 Regular diet.

## 2023-04-30 NOTE — CARE PLAN
The patient is Watcher - Medium risk of patient condition declining or worsening    Shift Goals  Clinical Goals: NG decompression  Patient Goals: Comfort - N/V and pain relief  Family Goals: rosemarie    Progress made toward(s) clinical / shift goals:    Problem: Skin Integrity  Goal: Skin integrity is maintained or improved  4/30/2023 1545 by Casey Zaldivar R.N.  Outcome: Progressing  4/30/2023 1543 by Casey Zaldivar R.N.  Outcome: Progressing   Skin assessment complete, skin intact, pt encouraged to turn self and ambulate as tolerated.     Problem: Pain - Standard  Goal: Alleviation of pain or a reduction in pain to the patient’s comfort goal  Outcome: Progressing   Pt receiving prn pain medication as ordered per MD. Pt also using distraction to decrease pain.

## 2023-04-30 NOTE — H&P
DATE OF CONSULTATION:  4/30/2023     REFERRING PHYSICIAN:   Darwin English M.D.     CONSULTING PHYSICIAN:  Cydney Donnelly M.D.     REASON FOR CONSULTATION:  I have been asked by  to see the patient in surgical consultation for evaluation of ischemic bowel.    HISTORY OF PRESENT ILLNESS: The patient is a 63 year-old White man who presents to the Emergency Department with a 2- day history of moderate and severe generalized abdominal pain. The pain is associated with nausea, abdominal distension, and malaise. The patient denies any recent or intercurrent illness. The patient has undergone innumerable hernia repairs (he states he lost count) and recent exploratory laparotomy (2/28/23) for bowel obstruction. The patient has undergone previous abdominal surgery for bowel obstruction. His last exploration for obstruction was two months ago.  At that time he had severe lactic acidosis and no source for that was identified through copious lysis of adhesions.  He presents with a similar picture this time.  He states he was passing flatus up to last night and now has stopped.  He did have a significant lactic acidosis at outside hospital, but it is now within normal limits.  He has a nasogastric tube, which I was able to get a liter out of after some manipulation. He also has portal venous gas on his scan, which was not previously present. He has been taking his pradaxa for atrial fibrillation through last night.      PAST MEDICAL HISTORY:  has a past medical history of Arrhythmia, Bowel obstruction (HCC), H/O abdominal abscess (02/10/2008), H/O TIA (transient ischemic attack) and stroke (04/01/2012), Hernias of the abdominal cavity, Hyperlipidemia, Hypertension, MI (myocardial infarction) (HCC) (11/05/2015), Morbid obesity ([facility]), Obesity, Sleep apnea, and Stroke ([facility]).  Heart failure, HTN, Afib    PAST SURGICAL HISTORY:  has a past surgical history that includes appendectomy (1978); splenectomy (1978);  colectomy (12/15/2005); skin abscess incision and drainage (12/23/2005); incision and drainage general (02/10/2008); lysis adhesions general (07/23/2008); umbilical hernia repair (12/30/2004); hernia repair (02/05/2008); incision hernia repair (07/23/2008); other cardiac surgery; ventral hernia repair (11/13/2013); wound dehiscence (11/19/2013); ir recovery room (12/30/2013); pr exploratory of abdomen (N/A, 2/28/2023); and lysis adhesions general (N/A, 2/28/2023).    ALLERGIES:   Allergies   Allergen Reactions    Bee Venom Anaphylaxis    Morphine Itching       CURRENT MEDICATIONS:    Home Medications    **Home medications have not yet been reviewed for this encounter**     Current Outpatient Medications:     FLUoxetine (PROZAC) 20 MG Cap, TAKE THREE CAPSULES BY MOUTH EVERY MORNING, Disp: 270 Capsule, Rfl: 3    sacubitril-valsartan (ENTRESTO) 24-26 MG Tab, Entresto 24 mg-26 mg tablet, Disp: , Rfl:     dapagliflozin propanediol (FARXIGA) 5 MG Tab, Take 1 Tablet by mouth every day., Disp: 100 Tablet, Rfl: 3    amLODIPine (NORVASC) 10 MG Tab, Take 1 Tablet by mouth every day., Disp: 100 Tablet, Rfl: 3    gabapentin (NEURONTIN) 300 MG Cap, Take 1 Capsule by mouth 3 times a day., Disp: 90 Capsule, Rfl: 3    MOVANTIK 25 MG Tab, TAKE ONE TABLET BY MOUTH OR FEEDING TUBE DAILY, Disp: 90 Tablet, Rfl: 3    pravastatin (PRAVACHOL) 40 MG tablet, TAKE ONE TABLET BY MOUTH EVERY EVENING, Disp: 90 Tablet, Rfl: 3    terazosin (HYTRIN) 1 MG Cap, TAKE ONE CAPSULE BY MOUTH EVERY MORNING AND TAKE TWO CAPSULES EVERY NIGHT AT BEDTIME, Disp: 270 Capsule, Rfl: 3    furosemide (LASIX) 20 MG Tab, 2 tablets daily in am, Disp: 180 Tablet, Rfl: 3    albuterol 108 (90 Base) MCG/ACT Aero Soln inhalation aerosol, INHALE TWO PUFFS BY MOUTH EVERY 6 HOURS AS NEEDED FOR SHORTNESS OF BREATH - taking more often lately, Disp: 1 Each, Rfl: 3    metoprolol tartrate (LOPRESSOR) 100 MG Tab, Take 1 Tablet by mouth 2 times a day. (Patient not taking: Reported on  4/5/2023), Disp: 60 Tablet, Rfl: 0    PRADAXA 150 MG Cap capsule, TAKE ONE CAPSULE BY MOUTH TWICE A DAY, Disp: 180 Capsule, Rfl: 3    hydrocortisone rectal (PERIANAL) 2.5% Cream, APPLY TO AFFECTED AREA(S) TWO TIMES A DAY AS DIRECTED, Disp: 30 g, Rfl: 3    EPINEPHrine (EPIPEN) 0.3 MG/0.3ML Solution Auto-injector solution for injection, Inject 0.3 mL into the shoulder, thigh, or buttocks as needed., Disp: 2 Each, Rfl: 3    hydrocortisone 2.5 % Cream topical cream, 2 times daily as needed, Disp: 60 g, Rfl: 3    potassium chloride (MICRO-K) 10 MEQ capsule, Take 1 capsule by mouth 2 times a day., Disp: 60 capsule, Rfl: 3    polyethylene glycol/lytes (MIRALAX) 17 g Pack, Take 1 Packet by mouth every day., Disp: 30 Each, Rfl: 1    thiamine (THIAMINE) 100 MG tablet, Take 1 tablet by mouth every day., Disp: 30 tablet, Rfl: 3    Respiratory Therapy Supplies (CARETOUCH 2 CPAP HOSE ) Misc, CPAP, Disp: , Rfl:     ROPINIRole (REQUIP) 1 MG Tab, 1 tab at hs with an increasing amount of the 0.25 mg tablets as directed, Disp: 90 Tab, Rfl: 3    oxyCODONE-acetaminophen (PERCOCET-10)  MG Tab, Take 1 Tab by mouth every four hours as needed for Severe Pain., Disp: , Rfl:     tizanidine (ZANAFLEX) 2 MG tablet, Take 1 mg by mouth every day., Disp: , Rfl:     oxyCODONE HCl (OXYCONTIN PO), Take 15 mg by mouth 2 Times a Day., Disp: , Rfl:     Blood Glucose Monitoring Suppl SUPPLIES Misc, Test strips for Freestyle meter. Use to test blood sugars 2-3 times daily, Diagnosis code E11.9, Disp: 100 Strip, Rfl: 11    Lancets Misc, Lancets for Freestyle meter. Use to test blood sugars 2-3 times daily, Diagnosis code E11.9, Disp: 100 Each, Rfl: 11    diphenhydrAMINE (BENADRYL) 25 MG TABS, Take 50 mg by mouth every 6 hours as needed., Disp: , Rfl:     FAMILY HISTORY: family history includes Diabetes in his sister; Heart Disease in his father; Hypertension in his mother; Lung Disease in his father; Stroke in his father.    SOCIAL HISTORY:   reports that he quit smoking about 11 years ago. His smoking use included cigarettes. He has a 10.00 pack-year smoking history. He has never used smokeless tobacco. He reports current alcohol use of about 1.2 oz per week. He reports that he does not currently use drugs after having used the following drugs: Inhaled and Marijuana.    REVIEW OF SYSTEMS: Comprehensive review of systems is negative with the exception of the aforementioned HPI, PMH, and PSH bullets in accordance with CMS guidelines.    PHYSICAL EXAMINATION:    Physical Exam  Vitals and nursing note reviewed. Exam conducted with a chaperone present.   Constitutional:       Appearance: He is obese.   HENT:      Head: Normocephalic and atraumatic.      Right Ear: External ear normal.      Left Ear: External ear normal.      Nose:      Comments: NG in place     Mouth/Throat:      Pharynx: Oropharynx is clear.   Eyes:      Pupils: Pupils are equal, round, and reactive to light.   Cardiovascular:      Rate and Rhythm: Tachycardia present. Rhythm irregular.      Pulses: Normal pulses.   Pulmonary:      Effort: Pulmonary effort is normal.   Abdominal:      General: There is distension.      Tenderness: There is abdominal tenderness. There is no guarding or rebound.      Hernia: A hernia is present.      Comments: Enormous ventral hernia and distention, tender globally   Musculoskeletal:         General: No swelling. Normal range of motion.      Cervical back: Normal range of motion.   Skin:     General: Skin is warm.      Capillary Refill: Capillary refill takes 2 to 3 seconds.   Neurological:      Mental Status: He is alert and oriented to person, place, and time.   Psychiatric:         Mood and Affect: Mood normal.       LABORATORY VALUES:   Recent Labs     04/29/23  2230 04/30/23  0302   WBC 21.5* 18.9*   RBC 4.86 4.84   HEMOGLOBIN 15.7 15.4   HEMATOCRIT 45.9 47.0   MCV 94.4* 97.1   MCH 32.3* 31.8   MCHC 34.2 32.8*   RDW 13.6 48.3   PLATELETCT 292 317   MPV  10.6* 10.2     Recent Labs     04/29/23 2230 04/30/23  0302   SODIUM 134* 139   POTASSIUM 4.5 4.6   CHLORIDE 96* 102   CO2 22 22   GLUCOSE 190* 187*   BUN 35* 38*   CREATININE 2.1* 2.14*   CALCIUM 10.1 9.7     Recent Labs     04/29/23 2230 04/30/23  0302   ASTSGOT 40* 34   ALTSGPT 36 27   TBILIRUBIN 0.8 1.5   ALKPHOSPHAT 70 63   GLOBULIN  --  3.4            IMAGING:   EC-ECHOCARDIOGRAM COMPLETE W/O CONT    (Results Pending)       ASSESSMENT AND PLAN:     SBO (small bowel obstruction) (HCC)- (present on admission)  Assessment & Plan  CT with small bowel obstruction and portal venous gas.    Lactate has corrected with fluid resuscitation.   Reverse anticoagulation, NG decompression and NPO.   Ordered echo to better characterize heart failure.    Patient is high risk for surgery should he fail to resolve his obstruction.  Unclear if compromised bowel in the face of normalized lactate.  Abx for portal venous gas and pneumatosis. Close observation.        DISPOSITION: Medical evaluation and admission for heart failure, COPD, HTN, obesity. [facility] will follow.     ____________________________________     Cydney Donnelly M.D.    DD: 4/30/2023  4:38 AM    ACS NSQIP Surgical Risk Calculator

## 2023-04-30 NOTE — ASSESSMENT & PLAN NOTE
On 4 L of O2 above his baseline. appears to be a component of HEATHER but because of NG tube proper seal cannot be completed; patient given oxygen supplementation as compromised.  Patient denies cough, shortness of breath.  Patient only endorses hypoxia when napping, sleeping.  -Can encourage ISS and ambulation

## 2023-04-30 NOTE — ASSESSMENT & PLAN NOTE
Appears to be not in exacerbation.  Monitor volume status  Repeat cardiac echo  Monitor on telemetry  Holding Entresto

## 2023-04-30 NOTE — ED TRIAGE NOTES
"Chief Complaint   Patient presents with    Abdominal Pain     Patient is IFT from [facility] with a diagnosis of a large bowel obstruction. Given 4mg Dilaudid pta, very distended abdomen on arrival. ERP present on arrival of patient.      Patient biba for above as transer from [facility]. A/Ox4, nonambulatory but able to stand and pivot with assistance, EMS placed PIV pta. Patient has NG tube that was placed at previous facility as well. Respirations even and unlabored, but on 4LPM O2 to maintain sats.     BP (!) 144/75   Pulse (!) 110   Temp 36.5 °C (97.7 °F)   Resp 12   Ht 1.778 m (5' 10\")   Wt (!) 136 kg (300 lb)   SpO2 92%   BMI 43.05 kg/m²    "

## 2023-04-30 NOTE — PROGRESS NOTES
"  DATE: 4/30/2023    Hospital Day 1 admitted with SBO.    INTERVAL EVENTS:  Patient feeling better after NG tube placed.    ROS:  Review of systems is remarkable for the following no abdominal pain, nausea, vomiting. The remainder of the comprehensive ROS is negative with the exception of the aforementioned HPI, PMH, and PSH bullets in accordance with CMS guideline.    PHYSICAL EXAMINATION:  Vital Signs: BP (!) 152/89   Pulse 89   Temp 36.4 °C (97.5 °F) (Temporal)   Resp 20   Ht 1.778 m (5' 10\")   Wt (!) 136 kg (300 lb)   SpO2 99%     Physical Exam  Abdominal:      General: Abdomen is protuberant. A surgical scar is present. There is distension.      Palpations: Abdomen is soft.      Tenderness: There is generalized abdominal tenderness.      Hernia: A hernia is present. Hernia is present in the ventral area.       LABORATORY VALUES:   Recent Labs     04/29/23 2230 04/30/23  0302   WBC 21.5* 18.9*   RBC 4.86 4.84   HEMOGLOBIN 15.7 15.4   HEMATOCRIT 45.9 47.0   MCV 94.4* 97.1   MCH 32.3* 31.8   MCHC 34.2 32.8*   RDW 13.6 48.3   PLATELETCT 292 317   MPV 10.6* 10.2     Recent Labs     04/29/23 2230 04/30/23  0302   SODIUM 134* 139   POTASSIUM 4.5 4.6   CHLORIDE 96* 102   CO2 22 22   GLUCOSE 190* 187*   BUN 35* 38*   CREATININE 2.1* 2.14*   CALCIUM 10.1 9.7     Recent Labs     04/29/23 2230 04/30/23  0302   ASTSGOT 40* 34   ALTSGPT 36 27   TBILIRUBIN 0.8 1.5   ALKPHOSPHAT 70 63   GLOBULIN  --  3.4            IMAGING:   OUTSIDE IMAGES-CT ABDOMEN /PELVIS   Final Result      OUTSIDE IMAGES-DX CHEST   Final Result      EC-ECHOCARDIOGRAM COMPLETE W/O CONT    (Results Pending)       ASSESSMENT AND PLAN:   * SBO (small bowel obstruction) (HCC)- (present on admission)  Assessment & Plan  CT with small bowel obstruction and portal venous gas.    Lactate has corrected with fluid resuscitation.   Reverse anticoagulation, NG decompression and NPO.   Ordered echo to better characterize heart failure.    Patient is high risk " for surgery should he fail to resolve his obstruction.  Unclear if compromised bowel in the face of normalized lactate.  Abx for portal venous gas and pneumatosis. Close observation.      - [facility] service following  Follow up this afternoon he reports ongoing improvement no guarding no rebound     ____________________________________     Maylin rBown P.A.-C.    DD: 4/30/2023  12:43 PM

## 2023-04-30 NOTE — ASSESSMENT & PLAN NOTE
Patient's had multiple abdominal surgeries in the past most recently in February of this year for hernia evaluation/management and removal of adhesions.  His last bowel movement was the day prior to admission.  -Lactic acid was elevated at admission which was concerning for incarcerated hernia versus bowel ischemia.  This has since resolved (Trend 4.6 to 1.8.)  --Continuing IV Zosyn   -Continuing IV fluids with NS at 83 mL/hr  -Pain control: Patient we will be de-escalating to oral Oxy from pure IV Dilaudid as pain appears to be improving.  --Tolerating clear liquid diet.  Advancing diet as tolerated to high-fiber diet.  -Surgery is following; appreciate recommendations.  Appears no need for acute intervention at this time.

## 2023-04-30 NOTE — ED NOTES
RN noticed that the patient had a run of V-Tach for 6 beats on the monitor. Printed strip and placed in patient's chart, assessed patient and found him to be stable and still in A-Fib per his baseline. Hospitalist informed of event and stated that Mag Sulfate had been ordered and he would like it started. Medicated per MAR.

## 2023-04-30 NOTE — ED PROVIDER NOTES
ED Provider Note    CHIEF COMPLAINT  Chief Complaint   Patient presents with    Abdominal Pain     Patient is IFT from [facility] with a diagnosis of a large bowel obstruction. Given 4mg Dilaudid pta, very distended abdomen on arrival. ERP present on arrival of patient.        EXTERNAL RECORDS REVIEWED  Inpatient Notes [place] records reviewed upon arrival  [facility] surgical notes also reviewed from recent surgeries completed by Dr. Can    HPI/ROS  LIMITATION TO HISTORY   Select: : None  OUTSIDE HISTORIAN(S):  EMS report directly to myself    Yonny Wallace is a 63 y.o. male who presents to the emergency room as a transfer from [facility] for finding of recurrent SBO and concern for bowel ischemia.  Patient with known A-fib on Pradaxa.  Patient with complicated medical and surgical history as documented below.  Recent surgery completed by Dr. Can for extensive lysis of adhesions.  Patient is now presenting back to his local ER with increasing nausea, vomiting abdominal pain similar to prior bowel obstruction.  There is CT imaging confirms recurrent SBO with likely bowel ischemia.  Initial elevated white count and elevated lactic acid.  Patient was treated aggressively with IV fluids, NG tube and antibiotics.  Continues to have moderate to severe pain.  No significant changes or clinical decline while in route.    Patient states that he did have a bowel movement yesterday.  Did have some passage of gas.  No fevers or chills.    PAST MEDICAL HISTORY   has a past medical history of Arrhythmia, Bowel obstruction ([facility]), H/O abdominal abscess (02/10/2008), H/O TIA (transient ischemic attack) and stroke (04/01/2012), Hernias of the abdominal cavity, Hyperlipidemia, Hypertension, MI (myocardial infarction) ([facility]) (11/05/2015), Morbid obesity ([facility]), Obesity, Sleep apnea, and Stroke ([facility]).    SURGICAL HISTORY   has a past surgical history that includes appendectomy (1978); splenectomy (1978); colectomy  "(12/15/2005); skin abscess incision and drainage (2005); incision and drainage general (02/10/2008); lysis adhesions general (2008); umbilical hernia repair (2004); hernia repair (2008); incision hernia repair (2008); other cardiac surgery; ventral hernia repair (2013); wound dehiscence (2013); ir recovery room (2013); exploratory of abdomen (N/A, 2023); and lysis adhesions general (N/A, 2023).    FAMILY HISTORY  Family History   Problem Relation Age of Onset    Heart Disease Father     Stroke Father     Lung Disease Father         lung cancer    Hypertension Mother     Diabetes Sister        SOCIAL HISTORY  Social History     Tobacco Use    Smoking status: Former     Packs/day: 0.50     Years: 20.00     Pack years: 10.00     Types: Cigarettes     Quit date: 2012     Years since quittin.0    Smokeless tobacco: Never   Vaping Use    Vaping Use: Never used   Substance and Sexual Activity    Alcohol use: Yes     Alcohol/week: 1.2 oz     Types: 2 Cans of beer per week    Drug use: Not Currently     Types: Inhaled, Marijuana    Sexual activity: Not Currently       CURRENT MEDICATIONS  Home Medications    **Home medications have not yet been reviewed for this encounter**         ALLERGIES  Allergies   Allergen Reactions    Bee Venom Anaphylaxis    Morphine Itching       PHYSICAL EXAM  VITAL SIGNS: BP (!) 144/75   Pulse (!) 114   Temp 36.5 °C (97.7 °F)   Resp 14   Ht 1.778 m (5' 10\")   Wt (!) 136 kg (300 lb)   SpO2 91%   BMI 43.05 kg/m²      Pulse ox interpretation: I interpret this pulse ox as normal.  Constitutional: Alert in no apparent distress.  HENT: No signs of trauma, Bilateral external ears normal, Nose normal.   Eyes: Pupils are equal and reactive  Neck: Normal range of motion, No tenderness, Supple  Cardiovascular: Regular rate and rhythm, no murmurs.   Thorax & Lungs: Normal breath sounds, No respiratory distress, No wheezing, No chest " tenderness.   Abdomen: Bowel sounds normal, Soft, No tenderness  Skin: Warm, Dry, No erythema, No rash.   Extremities: Intact distal pulses, No edema, No tenderness  Musculoskeletal: Good range of motion in all major joints. No tenderness to palpation or major deformities noted.   Neurologic: Alert , Normal motor function, Normal sensory function, No focal deficits noted.   Psychiatric: Affect normal, Judgment normal, Mood normal.         DIAGNOSTIC STUDIES / PROCEDURES    LABS  Results for orders placed or performed during the hospital encounter of 04/30/23   CBC WITH DIFFERENTIAL   Result Value Ref Range    WBC 18.9 (H) 4.8 - 10.8 K/uL    RBC 4.84 4.70 - 6.10 M/uL    Hemoglobin 15.4 14.0 - 18.0 g/dL    Hematocrit 47.0 42.0 - 52.0 %    MCV 97.1 81.4 - 97.8 fL    MCH 31.8 27.0 - 33.0 pg    MCHC 32.8 (L) 33.7 - 35.3 g/dL    RDW 48.3 35.9 - 50.0 fL    Platelet Count 317 164 - 446 K/uL    MPV 10.2 9.0 - 12.9 fL    Neutrophils-Polys 87.80 (H) 44.00 - 72.00 %    Lymphocytes 4.30 (L) 22.00 - 41.00 %    Monocytes 7.00 0.00 - 13.40 %    Eosinophils 0.00 0.00 - 6.90 %    Basophils 0.30 0.00 - 1.80 %    Immature Granulocytes 0.60 0.00 - 0.90 %    Nucleated RBC 0.10 /100 WBC    Neutrophils (Absolute) 16.55 (H) 1.82 - 7.42 K/uL    Lymphs (Absolute) 0.82 (L) 1.00 - 4.80 K/uL    Monos (Absolute) 1.32 (H) 0.00 - 0.85 K/uL    Eos (Absolute) 0.00 0.00 - 0.51 K/uL    Baso (Absolute) 0.05 0.00 - 0.12 K/uL    Immature Granulocytes (abs) 0.12 (H) 0.00 - 0.11 K/uL    NRBC (Absolute) 0.02 K/uL   COMP METABOLIC PANEL   Result Value Ref Range    Sodium 139 135 - 145 mmol/L    Potassium 4.6 3.6 - 5.5 mmol/L    Chloride 102 96 - 112 mmol/L    Co2 22 20 - 33 mmol/L    Anion Gap 15.0 7.0 - 16.0    Glucose 187 (H) 65 - 99 mg/dL    Bun 38 (H) 8 - 22 mg/dL    Creatinine 2.14 (H) 0.50 - 1.40 mg/dL    Calcium 9.7 8.5 - 10.5 mg/dL    AST(SGOT) 34 12 - 45 U/L    ALT(SGPT) 27 2 - 50 U/L    Alkaline Phosphatase 63 30 - 99 U/L    Total Bilirubin 1.5 0.1  - 1.5 mg/dL    Albumin 4.6 3.2 - 4.9 g/dL    Total Protein 8.0 6.0 - 8.2 g/dL    Globulin 3.4 1.9 - 3.5 g/dL    A-G Ratio 1.4 g/dL   LIPASE   Result Value Ref Range    Lipase 37 11 - 82 U/L   LACTIC ACID   Result Value Ref Range    Lactic Acid 1.8 0.5 - 2.0 mmol/L   CORRECTED CALCIUM   Result Value Ref Range    Correct Calcium 9.2 8.5 - 10.5 mg/dL   ESTIMATED GFR   Result Value Ref Range    GFR (CKD-EPI) 33 (A) >60 mL/min/1.73 m 2     CRITICAL CARE  The very real possibilty of a deterioration of this patient's condition required the highest level of my preparedness for sudden, emergent intervention.  I provided critical care services, which included medication orders, frequent reevaluations of the patient's condition and response to treatment, ordering and reviewing test results, and discussing the case with various consultants.  The critical care time associated with the care of the patient was 35 minutes. Review chart for interventions. This time is exclusive of any other billable procedures.       RADIOLOGY  [place] imaging reviewed: Air-fluid levels consistent with SBO's diagnosed    COURSE & MEDICAL DECISION MAKING    ED Observation Status? No; Patient does not meet criteria for ED Observation.     INITIAL ASSESSMENT, COURSE AND PLAN  Care Narrative: 63-year-old male presented the emergency room as a transfer from [place] for recurrent SBO with bowel ischemia.  Will consult general surgery and repeat labs at this time.      DISPOSITION AND DISCUSSIONS  I have discussed management of the patient with the following physicians and BLU's: General surgery Dr. Donnelly and hospitalist    Discussion of management with other \[facility]\"" or appropriate source(s): Pharmacy for anticoagulation reversal    63-year-old male presenting to the emergency department with the above presentation sending facility with CT showing recurrent SBO and bowel ischemia.  Initial elevated inflammatory markers to include white count and  lactic acid have now downtrending with repeat here.  Patient does have NG in place and is feeling better but was still requiring ongoing narcotic pain management.    Of note the patient does have a significant medical history as well complicating his comorbid diseases.  He is on chronic anticoagulation of Pradaxa for his chronic A-fib.  He is currently in A-fib with RVR which I believe is likely secondary to his current intra-abdominal processes.  Given his n.p.o. status he has been continuing on IV fluids.  He will be provided with [employer] for reversal.    With consultation with general surgeon they will continue consultation care and surgical planning but will defer from emergent operative care plan at this hour.  They have requested that the hospitalist be additionally brought on for primary admission and medical management.  Of note the patient does have significant cardiac history not only to include his A-fib as described above but also with significantly diminished heart failure with most recent ejection fraction of roughly 40%.        FINAL DIAGNOSIS  1. SBO (small bowel obstruction) (HCC)    2.  A-fib RVR on anticoagulation  3.  History of CHF  4.  SUHAS       Electronically signed by: Darwin English M.D., 4/30/2023 3:45 AM

## 2023-04-30 NOTE — ASSESSMENT & PLAN NOTE
Start on insulin sliding scale with serial Accu-Checks  Hemoglobin A1c was 6.2  Hypoglycemic protocol in place

## 2023-04-30 NOTE — ASSESSMENT & PLAN NOTE
Patient was found to have a heart rate greater than 130 in ER and was given another dose of IV metoprolol  IV metoprolol has been ordered    Pradaxa was reversed in the ER.  Restarting anticoagulation with heparin drip.

## 2023-04-30 NOTE — ASSESSMENT & PLAN NOTE
Likely prerenal given loss of appetite prior, prior n.p.o. status  -Continue IV fluid resuscitation as above.    -Monitor BMP and assess response  -Avoid IV contrast/nephrotoxins/NSAIDs. Dose adjust meds for decreased GFR

## 2023-04-30 NOTE — H&P
Hospital Medicine History & Physical Note    Date of Service  4/30/2023    Primary Care Physician  Carter Murphy M.D.    Consultants  general surgery    Specialist Names:     Code Status  Full Code    Chief Complaint  Chief Complaint   Patient presents with    Abdominal Pain     Patient is IFT from [facility] with a diagnosis of a large bowel obstruction. Given 4mg Dilaudid pta, very distended abdomen on arrival. ERP present on arrival of patient.        History of Presenting Illness  Yonny Wallace is a 63 y.o. male who presented 4/30/2023 with past medical history of multiple abdominal surgeries including multiple hernia surgeries, atrial fibrillation on Pradaxa, systolic and diastolic heart failure, type 2 diabetes, upper extremity DVT who presents to the hospital for abdominal pain, distention at his hernia site.  Patient states that he has a hernia has been growing for over the past year.  Patient's last hernia surgery was in 2/28/2023 and underwent a lysis of adhesions.  His last bowel movement was this morning.  He denies any bloody stools or diarrhea.  It is associated with nausea and vomiting.  He denies any fever, chills, dysuria, chest pain, shortness of breath.      Patient was transferred from [facility]  He was found to have a lactic acid of 4.  There was concern of bowel ischemia or incarceration  CT scan of the abdomen found small bowel obstruction  Chest x-ray found increased intravascular congestion and acute pulmonary edema    I discussed the plan of care with patient.    Review of Systems  Review of Systems   Constitutional:  Negative for chills, diaphoresis, fever and malaise/fatigue.   HENT:  Negative for congestion, ear discharge, ear pain, hearing loss, nosebleeds, sinus pain, sore throat and tinnitus.    Eyes:  Negative for blurred vision, double vision, photophobia and pain.   Respiratory:  Positive for shortness of breath. Negative for cough, hemoptysis,  sputum production, wheezing and stridor.    Cardiovascular:  Positive for leg swelling. Negative for chest pain, palpitations, orthopnea, claudication and PND.   Gastrointestinal:  Positive for abdominal pain, nausea and vomiting. Negative for blood in stool, constipation, diarrhea, heartburn and melena.   Genitourinary:  Negative for dysuria, flank pain, frequency, hematuria and urgency.   Musculoskeletal:  Negative for back pain, falls, joint pain, myalgias and neck pain.   Skin:  Negative for itching and rash.   Neurological:  Negative for dizziness, tingling, tremors, weakness and headaches.   Endo/Heme/Allergies:  Negative for environmental allergies and polydipsia. Does not bruise/bleed easily.   Psychiatric/Behavioral:  Negative for depression, hallucinations, substance abuse and suicidal ideas.      Past Medical History   has a past medical history of Arrhythmia, Bowel obstruction ([facility]), H/O abdominal abscess (02/10/2008), H/O TIA (transient ischemic attack) and stroke (04/01/2012), Hernias of the abdominal cavity, Hyperlipidemia, Hypertension, MI (myocardial infarction) ([facility]) (11/05/2015), Morbid obesity ([facility]), Obesity, Sleep apnea, and Stroke ([facility]).    Surgical History   has a past surgical history that includes appendectomy (1978); splenectomy (1978); colectomy (12/15/2005); skin abscess incision and drainage (12/23/2005); incision and drainage general (02/10/2008); lysis adhesions general (07/23/2008); umbilical hernia repair (12/30/2004); hernia repair (02/05/2008); incision hernia repair (07/23/2008); other cardiac surgery; ventral hernia repair (11/13/2013); wound dehiscence (11/19/2013); ir recovery room (12/30/2013); pr exploratory of abdomen (N/A, 2/28/2023); and lysis adhesions general (N/A, 2/28/2023).     Family History  family history includes Diabetes in his sister; Heart Disease in his father; Hypertension in his mother; Lung Disease in his father; Stroke in his father.   Family history  reviewed with patient. There is no family history that is pertinent to the chief complaint.     Social History   reports that he quit smoking about 11 years ago. His smoking use included cigarettes. He has a 10.00 pack-year smoking history. He has never used smokeless tobacco. He reports current alcohol use of about 1.2 oz per week. He reports that he does not currently use drugs after having used the following drugs: Inhaled and Marijuana.    Allergies  Allergies   Allergen Reactions    Bee Venom Anaphylaxis    Morphine Itching       Medications  Prior to Admission Medications   Prescriptions Last Dose Informant Patient Reported? Taking?   Blood Glucose Monitoring Suppl SUPPLIES Misc   No No   Sig: Test strips for Freestyle meter. Use to test blood sugars 2-3 times daily, Diagnosis code E11.9   EPINEPHrine (EPIPEN) 0.3 MG/0.3ML Solution Auto-injector solution for injection   No No   Sig: Inject 0.3 mL into the shoulder, thigh, or buttocks as needed.   FLUoxetine (PROZAC) 20 MG Cap   No No   Sig: TAKE THREE CAPSULES BY MOUTH EVERY MORNING   Lancets Misc   No No   Sig: Lancets for Freestyle meter. Use to test blood sugars 2-3 times daily, Diagnosis code E11.9   MOVANTIK 25 MG Tab   No No   Sig: TAKE ONE TABLET BY MOUTH OR FEEDING TUBE DAILY   PRADAXA 150 MG Cap capsule   No No   Sig: TAKE ONE CAPSULE BY MOUTH TWICE A DAY   ROPINIRole (REQUIP) 1 MG Tab   No No   Si tab at hs with an increasing amount of the 0.25 mg tablets as directed   Respiratory Therapy Supplies (CARETOUCH 2 CPAP HOSE ) Misc   Yes No   Sig: CPAP   albuterol 108 (90 Base) MCG/ACT Aero Soln inhalation aerosol   No No   Sig: INHALE TWO PUFFS BY MOUTH EVERY 6 HOURS AS NEEDED FOR SHORTNESS OF BREATH - taking more often lately   amLODIPine (NORVASC) 10 MG Tab   No No   Sig: Take 1 Tablet by mouth every day.   dapagliflozin propanediol (FARXIGA) 5 MG Tab   No No   Sig: Take 1 Tablet by mouth every day.   diphenhydrAMINE (BENADRYL) 25 MG TABS    Yes No   Sig: Take 50 mg by mouth every 6 hours as needed.   furosemide (LASIX) 20 MG Tab   No No   Si tablets daily in am   hydrocortisone 2.5 % Cream topical cream   No No   Si times daily as needed   hydrocortisone rectal (PERIANAL) 2.5% Cream   No No   Sig: APPLY TO AFFECTED AREA(S) TWO TIMES A DAY AS DIRECTED   metoprolol tartrate (LOPRESSOR) 100 MG Tab   No No   Sig: Take 1 Tablet by mouth 2 times a day.   Patient not taking: Reported on 2023   oxyCODONE-acetaminophen (PERCOCET-10)  MG Tab   Yes No   Sig: Take 1 Tab by mouth every four hours as needed for Severe Pain.   polyethylene glycol/lytes (MIRALAX) 17 g Pack   No No   Sig: Take 1 Packet by mouth every day.   potassium chloride (MICRO-K) 10 MEQ capsule   No No   Sig: Take 1 capsule by mouth 2 times a day.   pravastatin (PRAVACHOL) 40 MG tablet   No No   Sig: TAKE ONE TABLET BY MOUTH EVERY EVENING   sacubitril-valsartan (ENTRESTO) 24-26 MG Tab   Yes No   Sig: Entresto 24 mg-26 mg tablet   terazosin (HYTRIN) 1 MG Cap   No No   Sig: TAKE ONE CAPSULE BY MOUTH EVERY MORNING AND TAKE TWO CAPSULES EVERY NIGHT AT BEDTIME   thiamine (THIAMINE) 100 MG tablet   No No   Sig: Take 1 tablet by mouth every day.   tizanidine (ZANAFLEX) 2 MG tablet   Yes No   Sig: Take 1 mg by mouth every day.      Facility-Administered Medications: None       Physical Exam  Temp:  [36.5 °C (97.7 °F)] 36.5 °C (97.7 °F)  Pulse:  [101-122] 114  Resp:  [12-19] 14  BP: (131-174)/() 144/75  SpO2:  [88 %-97 %] 91 %  Blood Pressure : (!) 144/75   Temperature: 36.5 °C (97.7 °F)   Pulse: (!) 114   Respiration: 14   Pulse Oximetry: 91 %       Physical Exam  Vitals and nursing note reviewed.   Constitutional:       General: He is not in acute distress.     Appearance: Normal appearance. He is not ill-appearing, toxic-appearing or diaphoretic.   HENT:      Head: Normocephalic and atraumatic.      Nose: No congestion or rhinorrhea.      Mouth/Throat:      Pharynx: No posterior  oropharyngeal erythema.   Eyes:      General: No scleral icterus.        Right eye: No discharge.   Cardiovascular:      Rate and Rhythm: Normal rate and regular rhythm.      Pulses: Normal pulses.      Heart sounds: Normal heart sounds. No murmur heard.    No friction rub. No gallop.   Pulmonary:      Effort: Pulmonary effort is normal. No respiratory distress.      Breath sounds: Normal breath sounds. No stridor. No wheezing, rhonchi or rales.   Abdominal:      General: There is distension.      Tenderness: There is abdominal tenderness.      Comments: Right-sided abdomen has a large hernia with midline incision  Hypoactive bowel sounds   Musculoskeletal:         General: No swelling, tenderness, deformity or signs of injury.      Cervical back: Normal range of motion.      Right lower leg: Edema present.      Left lower leg: Edema present.   Skin:     Capillary Refill: Capillary refill takes more than 3 seconds.      Coloration: Skin is not jaundiced or pale.      Findings: No bruising, erythema, lesion or rash.   Neurological:      General: No focal deficit present.      Mental Status: He is alert and oriented to person, place, and time.       Laboratory:  Recent Labs     04/29/23 2230 04/30/23  0302   WBC 21.5* 18.9*   RBC 4.86 4.84   HEMOGLOBIN 15.7 15.4   HEMATOCRIT 45.9 47.0   MCV 94.4* 97.1   MCH 32.3* 31.8   MCHC 34.2 32.8*   RDW 13.6 48.3   PLATELETCT 292 317   MPV 10.6* 10.2     Recent Labs     04/29/23 2230 04/30/23  0302   SODIUM 134* 139   POTASSIUM 4.5 4.6   CHLORIDE 96* 102   CO2 22 22   GLUCOSE 190* 187*   BUN 35* 38*   CREATININE 2.1* 2.14*   CALCIUM 10.1 9.7     Recent Labs     04/29/23 2230 04/30/23  0302   ALTSGPT 36 27   ASTSGOT 40* 34   ALKPHOSPHAT 70 63   TBILIRUBIN 0.8 1.5   LIPASE 29 37   GLUCOSE 190* 187*         No results for input(s): NTPROBNP in the last 72 hours.      No results for input(s): TROPONINT in the last 72 hours.    Imaging:  EC-ECHOCARDIOGRAM COMPLETE W/O CONT     (Results Pending)       X-Ray:  I have personally reviewed the images and compared with prior images.  EKG:  I have personally reviewed the images and compared with prior images.    Assessment/Plan:  Justification for Admission Status  I anticipate this patient will require at least two midnights for appropriate medical management, necessitating inpatient admission because SBO    Patient will need a Telemetry bed on MEDICAL service .  The need is secondary to SBO.    * SBO (small bowel obstruction) ([facility])- (present on admission)  Assessment & Plan  Patient's had multiple abdominal surgeries in the past.  His last bowel movement was yesterday.  Lactic acid is elevated and concerned about incarcerated hernia or bowel ischemia  IV Zosyn has ordered  IV fluids  Pain control  N.p.o.  Surgery is consulted    Acute respiratory failure with hypoxia ([facility])  Assessment & Plan  On 4 L of O2 above his baseline  Can encourage ISS and ambulation    Type 2 diabetes mellitus, without long-term current use of insulin ([facility])- (present on admission)  Assessment & Plan  Start on insulin sliding scale with serial Accu-Checks  Hemoglobin A1c was 6.2  Hypoglycemic protocol in place        SUHAS (acute kidney injury) ([facility])- (present on admission)  Assessment & Plan  Likely prerenal  IV fluid hydration  Monitor BMP and assess response  Avoid IV contrast/nephrotoxins/NSAIDs  Dose adjust meds for decreased GFR        Elevated lactic acid level- (present on admission)  Assessment & Plan  Concern for incarceration or bowel ischemia    Systolic congestive heart failure ([facility])- (present on admission)  Assessment & Plan  Decompensated  Monitor volume status  Repeat cardiac echo  Monitor on telemetry  Holding Entresto    Atrial fibrillation with RVR ([facility])  Assessment & Plan  Patient was found to have a heart rate greater than 130 in ER and was given another dose of IV metoprolol  Patient is n.p.o.  IV metoprolol has been ordered    Pradaxa was reversed  in the ER    HEATHER (obstructive sleep apnea)- (present on admission)  Assessment & Plan  CPAP machine    HTN (hypertension)- (present on admission)  Assessment & Plan  Patient is n.p.o.  IV prn medications have been ordered        VTE prophylaxis: SCDs/TEDs

## 2023-05-01 LAB
ALBUMIN SERPL BCP-MCNC: 3.6 G/DL (ref 3.2–4.9)
ALBUMIN/GLOB SERPL: 1.2 G/DL
ALP SERPL-CCNC: 44 U/L (ref 30–99)
ALT SERPL-CCNC: 21 U/L (ref 2–50)
ANION GAP SERPL CALC-SCNC: 12 MMOL/L (ref 7–16)
AST SERPL-CCNC: 45 U/L (ref 12–45)
BASOPHILS # BLD AUTO: 0.4 % (ref 0–1.8)
BASOPHILS # BLD: 0.04 K/UL (ref 0–0.12)
BILIRUB SERPL-MCNC: 0.9 MG/DL (ref 0.1–1.5)
BUN SERPL-MCNC: 42 MG/DL (ref 8–22)
CALCIUM ALBUM COR SERPL-MCNC: 8.6 MG/DL (ref 8.5–10.5)
CALCIUM SERPL-MCNC: 8.3 MG/DL (ref 8.5–10.5)
CHLORIDE SERPL-SCNC: 106 MMOL/L (ref 96–112)
CO2 SERPL-SCNC: 24 MMOL/L (ref 20–33)
CREAT SERPL-MCNC: 1.52 MG/DL (ref 0.5–1.4)
EOSINOPHIL # BLD AUTO: 0.1 K/UL (ref 0–0.51)
EOSINOPHIL NFR BLD: 1 % (ref 0–6.9)
ERYTHROCYTE [DISTWIDTH] IN BLOOD BY AUTOMATED COUNT: 50.9 FL (ref 35.9–50)
GFR SERPLBLD CREATININE-BSD FMLA CKD-EPI: 50 ML/MIN/1.73 M 2
GLOBULIN SER CALC-MCNC: 2.9 G/DL (ref 1.9–3.5)
GLUCOSE BLD STRIP.AUTO-MCNC: 117 MG/DL (ref 65–99)
GLUCOSE BLD STRIP.AUTO-MCNC: 119 MG/DL (ref 65–99)
GLUCOSE BLD STRIP.AUTO-MCNC: 144 MG/DL (ref 65–99)
GLUCOSE BLD STRIP.AUTO-MCNC: 154 MG/DL (ref 65–99)
GLUCOSE BLD STRIP.AUTO-MCNC: 97 MG/DL (ref 65–99)
GLUCOSE SERPL-MCNC: 142 MG/DL (ref 65–99)
HCT VFR BLD AUTO: 45.4 % (ref 42–52)
HGB BLD-MCNC: 14.6 G/DL (ref 14–18)
IMM GRANULOCYTES # BLD AUTO: 0.03 K/UL (ref 0–0.11)
IMM GRANULOCYTES NFR BLD AUTO: 0.3 % (ref 0–0.9)
LYMPHOCYTES # BLD AUTO: 1.43 K/UL (ref 1–4.8)
LYMPHOCYTES NFR BLD: 14.7 % (ref 22–41)
MCH RBC QN AUTO: 31.7 PG (ref 27–33)
MCHC RBC AUTO-ENTMCNC: 32.2 G/DL (ref 33.7–35.3)
MCV RBC AUTO: 98.5 FL (ref 81.4–97.8)
MONOCYTES # BLD AUTO: 1.87 K/UL (ref 0–0.85)
MONOCYTES NFR BLD AUTO: 19.2 % (ref 0–13.4)
NEUTROPHILS # BLD AUTO: 6.26 K/UL (ref 1.82–7.42)
NEUTROPHILS NFR BLD: 64.4 % (ref 44–72)
NRBC # BLD AUTO: 0 K/UL
NRBC BLD-RTO: 0 /100 WBC
PLATELET # BLD AUTO: 271 K/UL (ref 164–446)
PMV BLD AUTO: 10.4 FL (ref 9–12.9)
POTASSIUM SERPL-SCNC: 3.9 MMOL/L (ref 3.6–5.5)
PROT SERPL-MCNC: 6.5 G/DL (ref 6–8.2)
RBC # BLD AUTO: 4.61 M/UL (ref 4.7–6.1)
SODIUM SERPL-SCNC: 142 MMOL/L (ref 135–145)
WBC # BLD AUTO: 9.7 K/UL (ref 4.8–10.8)

## 2023-05-01 PROCEDURE — 700111 HCHG RX REV CODE 636 W/ 250 OVERRIDE (IP): Performed by: HOSPITALIST

## 2023-05-01 PROCEDURE — 700105 HCHG RX REV CODE 258: Performed by: SURGERY

## 2023-05-01 PROCEDURE — 700111 HCHG RX REV CODE 636 W/ 250 OVERRIDE (IP): Performed by: STUDENT IN AN ORGANIZED HEALTH CARE EDUCATION/TRAINING PROGRAM

## 2023-05-01 PROCEDURE — 700105 HCHG RX REV CODE 258: Performed by: HOSPITALIST

## 2023-05-01 PROCEDURE — 36415 COLL VENOUS BLD VENIPUNCTURE: CPT

## 2023-05-01 PROCEDURE — 85025 COMPLETE CBC W/AUTO DIFF WBC: CPT

## 2023-05-01 PROCEDURE — 700111 HCHG RX REV CODE 636 W/ 250 OVERRIDE (IP): Performed by: PHYSICIAN ASSISTANT

## 2023-05-01 PROCEDURE — 80053 COMPREHEN METABOLIC PANEL: CPT

## 2023-05-01 PROCEDURE — 99232 SBSQ HOSP IP/OBS MODERATE 35: CPT | Performed by: PHYSICIAN ASSISTANT

## 2023-05-01 PROCEDURE — 700105 HCHG RX REV CODE 258: Performed by: PHYSICIAN ASSISTANT

## 2023-05-01 PROCEDURE — 700111 HCHG RX REV CODE 636 W/ 250 OVERRIDE (IP)

## 2023-05-01 PROCEDURE — 82962 GLUCOSE BLOOD TEST: CPT

## 2023-05-01 PROCEDURE — 770020 HCHG ROOM/CARE - TELE (206)

## 2023-05-01 PROCEDURE — 99233 SBSQ HOSP IP/OBS HIGH 50: CPT | Mod: GC | Performed by: INTERNAL MEDICINE

## 2023-05-01 PROCEDURE — 700111 HCHG RX REV CODE 636 W/ 250 OVERRIDE (IP): Performed by: SURGERY

## 2023-05-01 RX ORDER — OXYCODONE HYDROCHLORIDE 10 MG/1
10 TABLET ORAL
Status: DISCONTINUED | OUTPATIENT
Start: 2023-05-01 | End: 2023-05-01

## 2023-05-01 RX ORDER — HEPARIN SODIUM 1000 [USP'U]/ML
80 INJECTION, SOLUTION INTRAVENOUS; SUBCUTANEOUS DAILY
Status: DISCONTINUED | OUTPATIENT
Start: 2023-05-01 | End: 2023-05-01

## 2023-05-01 RX ORDER — OXYCODONE HYDROCHLORIDE 5 MG/1
5 TABLET ORAL
Status: DISCONTINUED | OUTPATIENT
Start: 2023-05-01 | End: 2023-05-03 | Stop reason: HOSPADM

## 2023-05-01 RX ORDER — ENOXAPARIN SODIUM 150 MG/ML
1 INJECTION SUBCUTANEOUS EVERY 12 HOURS
Status: DISCONTINUED | OUTPATIENT
Start: 2023-05-01 | End: 2023-05-02

## 2023-05-01 RX ORDER — HYDROMORPHONE HYDROCHLORIDE 1 MG/ML
0.5 INJECTION, SOLUTION INTRAMUSCULAR; INTRAVENOUS; SUBCUTANEOUS EVERY 6 HOURS PRN
Status: DISCONTINUED | OUTPATIENT
Start: 2023-05-01 | End: 2023-05-01

## 2023-05-01 RX ORDER — OXYCODONE HYDROCHLORIDE 5 MG/1
5 TABLET ORAL
Status: DISCONTINUED | OUTPATIENT
Start: 2023-05-01 | End: 2023-05-01

## 2023-05-01 RX ORDER — OXYCODONE HYDROCHLORIDE 10 MG/1
10 TABLET ORAL
Status: DISCONTINUED | OUTPATIENT
Start: 2023-05-01 | End: 2023-05-03 | Stop reason: HOSPADM

## 2023-05-01 RX ORDER — HYDROMORPHONE HYDROCHLORIDE 1 MG/ML
0.5 INJECTION, SOLUTION INTRAMUSCULAR; INTRAVENOUS; SUBCUTANEOUS EVERY 4 HOURS PRN
Status: DISCONTINUED | OUTPATIENT
Start: 2023-05-01 | End: 2023-05-03 | Stop reason: HOSPADM

## 2023-05-01 RX ADMIN — HYDROMORPHONE HYDROCHLORIDE 0.5 MG: 1 INJECTION, SOLUTION INTRAMUSCULAR; INTRAVENOUS; SUBCUTANEOUS at 14:14

## 2023-05-01 RX ADMIN — PIPERACILLIN AND TAZOBACTAM 4.5 G: 4; .5 INJECTION, POWDER, LYOPHILIZED, FOR SOLUTION INTRAVENOUS; PARENTERAL at 11:51

## 2023-05-01 RX ADMIN — METHOCARBAMOL 1000 MG: 100 INJECTION INTRAMUSCULAR; INTRAVENOUS at 17:02

## 2023-05-01 RX ADMIN — METHOCARBAMOL 1000 MG: 100 INJECTION INTRAMUSCULAR; INTRAVENOUS at 22:48

## 2023-05-01 RX ADMIN — HYDROMORPHONE HYDROCHLORIDE 0.5 MG: 1 INJECTION, SOLUTION INTRAMUSCULAR; INTRAVENOUS; SUBCUTANEOUS at 10:04

## 2023-05-01 RX ADMIN — HYDROMORPHONE HYDROCHLORIDE 0.5 MG: 1 INJECTION, SOLUTION INTRAMUSCULAR; INTRAVENOUS; SUBCUTANEOUS at 00:19

## 2023-05-01 RX ADMIN — HYDROMORPHONE HYDROCHLORIDE 0.5 MG: 1 INJECTION, SOLUTION INTRAMUSCULAR; INTRAVENOUS; SUBCUTANEOUS at 18:39

## 2023-05-01 RX ADMIN — ONDANSETRON HYDROCHLORIDE 4 MG: 2 SOLUTION INTRAMUSCULAR; INTRAVENOUS at 03:48

## 2023-05-01 RX ADMIN — PIPERACILLIN AND TAZOBACTAM 4.5 G: 4; .5 INJECTION, POWDER, LYOPHILIZED, FOR SOLUTION INTRAVENOUS; PARENTERAL at 21:18

## 2023-05-01 RX ADMIN — ENOXAPARIN SODIUM 150 MG: 150 INJECTION SUBCUTANEOUS at 17:33

## 2023-05-01 RX ADMIN — HYDROMORPHONE HYDROCHLORIDE 0.5 MG: 1 INJECTION, SOLUTION INTRAMUSCULAR; INTRAVENOUS; SUBCUTANEOUS at 22:35

## 2023-05-01 RX ADMIN — PIPERACILLIN AND TAZOBACTAM 4.5 G: 4; .5 INJECTION, POWDER, LYOPHILIZED, FOR SOLUTION INTRAVENOUS; PARENTERAL at 05:15

## 2023-05-01 RX ADMIN — ONDANSETRON HYDROCHLORIDE 4 MG: 2 SOLUTION INTRAMUSCULAR; INTRAVENOUS at 08:04

## 2023-05-01 RX ADMIN — HYDROMORPHONE HYDROCHLORIDE 0.5 MG: 1 INJECTION, SOLUTION INTRAMUSCULAR; INTRAVENOUS; SUBCUTANEOUS at 03:48

## 2023-05-01 RX ADMIN — HYDROMORPHONE HYDROCHLORIDE 0.5 MG: 1 INJECTION, SOLUTION INTRAMUSCULAR; INTRAVENOUS; SUBCUTANEOUS at 06:50

## 2023-05-01 RX ADMIN — SODIUM CHLORIDE: 9 INJECTION, SOLUTION INTRAVENOUS at 05:14

## 2023-05-01 ASSESSMENT — PAIN DESCRIPTION - PAIN TYPE
TYPE: ACUTE PAIN;CHRONIC PAIN
TYPE: ACUTE PAIN
TYPE: ACUTE PAIN
TYPE: ACUTE PAIN;CHRONIC PAIN
TYPE: ACUTE PAIN

## 2023-05-01 ASSESSMENT — ENCOUNTER SYMPTOMS
SEIZURES: 0
BLOOD IN STOOL: 0
ABDOMINAL PAIN: 1
DIARRHEA: 0
INSOMNIA: 0
HEARTBURN: 0
CONSTIPATION: 0
LOSS OF CONSCIOUSNESS: 0
WHEEZING: 0
PALPITATIONS: 0
SHORTNESS OF BREATH: 0
COUGH: 0
CHILLS: 0
FEVER: 0

## 2023-05-01 ASSESSMENT — PATIENT HEALTH QUESTIONNAIRE - PHQ9
2. FEELING DOWN, DEPRESSED, IRRITABLE, OR HOPELESS: NOT AT ALL
1. LITTLE INTEREST OR PLEASURE IN DOING THINGS: NOT AT ALL
SUM OF ALL RESPONSES TO PHQ9 QUESTIONS 1 AND 2: 0

## 2023-05-01 ASSESSMENT — LIFESTYLE VARIABLES: SUBSTANCE_ABUSE: 0

## 2023-05-01 ASSESSMENT — COPD QUESTIONNAIRES
DO YOU EVER COUGH UP ANY MUCUS OR PHLEGM?: NO/ONLY WITH OCCASIONAL COLDS OR INFECTIONS
HAVE YOU SMOKED AT LEAST 100 CIGARETTES IN YOUR ENTIRE LIFE: YES
COPD SCREENING SCORE: 4
DURING THE PAST 4 WEEKS HOW MUCH DID YOU FEEL SHORT OF BREATH: NONE/LITTLE OF THE TIME

## 2023-05-01 NOTE — DIETARY
NUTRITION SERVICES: BMI - Pt with BMI >40 (=Body mass index is 43.05 kg/m².), Class III obesity. Of note, wt per flow sheet is estimated. Please obtain measured wt for pt when feasible. Weight loss counseling not appropriate in acute care setting.    RECOMMEND - If appropriate at DC please refer to outpatient nutrition services for weight management.

## 2023-05-01 NOTE — PROGRESS NOTES
"  DATE: 5/1/2023    Hospital Day 2 admitted with SBO.    INTERVAL EVENTS:  Complaining of significant back pain.  Nausea improved, passing flatus.  NG tube with clear output, 4500 cc.    ROS:  Review of systems is remarkable for the following no abdominal pain, nausea, vomiting. The remainder of the comprehensive ROS is negative with the exception of the aforementioned HPI, PMH, and PSH bullets in accordance with CMS guideline.    PHYSICAL EXAMINATION:  Vital Signs: BP (!) 142/92   Pulse (!) 115   Temp 36.3 °C (97.4 °F) (Temporal)   Resp 18   Ht 1.778 m (5' 10\")   Wt (!) 136 kg (300 lb)   SpO2 95%     Physical Exam  Abdominal:      General: Abdomen is protuberant. A surgical scar is present. There is distension.      Palpations: Abdomen is soft.      Tenderness: There is no abdominal tenderness.      Hernia: A hernia is present. Hernia is present in the ventral area.       LABORATORY VALUES:   Recent Labs     04/29/23 2230 04/30/23 0302 05/01/23 0427   WBC 21.5* 18.9* 9.7   RBC 4.86 4.84 4.61*   HEMOGLOBIN 15.7 15.4 14.6   HEMATOCRIT 45.9 47.0 45.4   MCV 94.4* 97.1 98.5*   MCH 32.3* 31.8 31.7   MCHC 34.2 32.8* 32.2*   RDW 13.6 48.3 50.9*   PLATELETCT 292 317 271   MPV 10.6* 10.2 10.4       Recent Labs     04/29/23 2230 04/30/23 0302 05/01/23 0427   SODIUM 134* 139 142   POTASSIUM 4.5 4.6 3.9   CHLORIDE 96* 102 106   CO2 22 22 24   GLUCOSE 190* 187* 142*   BUN 35* 38* 42*   CREATININE 2.1* 2.14* 1.52*   CALCIUM 10.1 9.7 8.3*       Recent Labs     04/29/23 2230 04/30/23 0302 05/01/23 0427   ASTSGOT 40* 34 45   ALTSGPT 36 27 21   TBILIRUBIN 0.8 1.5 0.9   ALKPHOSPHAT 70 63 44   GLOBULIN  --  3.4 2.9              IMAGING:   OUTSIDE IMAGES-CT ABDOMEN /PELVIS   Final Result      OUTSIDE IMAGES-DX CHEST   Final Result      EC-ECHOCARDIOGRAM COMPLETE W/O CONT    (Results Pending)       ASSESSMENT AND PLAN:   * SBO (small bowel obstruction) ([facility])- (present on admission)  Assessment & Plan  CT with small bowel " obstruction and portal venous gas.    Lactate has corrected with fluid resuscitation.   Reverse anticoagulation, NG decompression and NPO.   Ordered echo to better characterize heart failure.    Patient is high risk for surgery should he fail to resolve his obstruction.  Unclear if compromised bowel in the face of normalized lactate.  Abx for portal venous gas and pneumatosis. Close observation.  5/1 Nausea improved, passing flatus.  Clamp NG tube.      - Clamp NG tube  - Robaxin for back spasms  - [facility] service following       ____________________________________     Maylin Brown P.A.-C.    DD: 4/30/2023  12:43 PM

## 2023-05-01 NOTE — PROGRESS NOTES
[facility] Internal Medicine Daily Progress Note    Date of Service  5/1/2023    R Team: R IM [facility] Team   Attending: Gilmer Sow M.d.  Senior Resident: Dr. Artis Hills  Intern:  Dr. Jesse Santiago  Contact Number: [phone]    Chief Complaint  Yonny Wallace is a 63 y.o. male admitted 4/30/2023 with small bowel obstruction, concern for ischemic bowel within hernia.    Hospital Course  No notes on file    Interval Problem Update  -No acute events overnight  -Patient notes improvement in abdominal pain/tenderness.  Patient states he has been passing gas frequently.  -Surgery following; appears no need for any acute intervention at this time.  -Patient continuing to get Zofran, Dilaudid virtually around-the-clock.    I have discussed this patient's plan of care and discharge plan at IDT rounds today with Case Management, Nursing, Nursing leadership, and other members of the IDT team.    Consultants/Specialty  general surgery    Code Status  Full Code    Disposition  Patient is not medically cleared for discharge.   Anticipate discharge to to home with close outpatient follow-up.  I have placed the appropriate orders for post-discharge needs.    Review of Systems  Review of Systems   Constitutional:  Negative for chills and fever.   Respiratory:  Negative for cough, shortness of breath and wheezing.    Cardiovascular:  Negative for chest pain, palpitations and leg swelling.   Gastrointestinal:  Positive for abdominal pain. Negative for blood in stool, constipation, diarrhea, heartburn and melena.   Neurological:  Negative for seizures and loss of consciousness.   Psychiatric/Behavioral:  Negative for substance abuse. The patient does not have insomnia.       Physical Exam  Temp:  [36.2 °C (97.1 °F)-36.5 °C (97.7 °F)] 36.3 °C (97.4 °F)  Pulse:  [109-127] 115  Resp:  [16-21] 18  BP: (135-154)/() 142/92  SpO2:  [93 %-98 %] 95 %    Physical Exam  Vitals and nursing note reviewed.   Constitutional:       General:  He is not in acute distress.     Appearance: Normal appearance.   HENT:      Head: Normocephalic and atraumatic.      Right Ear: External ear normal.      Left Ear: External ear normal.   Eyes:      General:         Right eye: No discharge.         Left eye: No discharge.   Cardiovascular:      Rate and Rhythm: Normal rate and regular rhythm.      Pulses: Normal pulses.      Heart sounds: Normal heart sounds.   Pulmonary:      Effort: Pulmonary effort is normal. No respiratory distress.      Breath sounds: Normal breath sounds.   Abdominal:      General: There is no distension.      Palpations: There is no mass.      Tenderness: There is no abdominal tenderness. There is no guarding or rebound.      Hernia: A hernia is present.          Comments: Large hernia present in the  right lower quadrant.  Appears irreducible, but also without tenderness, guarding, rebound tenderness.   Musculoskeletal:         General: No swelling.      Right lower leg: No edema.      Left lower leg: No edema.   Skin:     Coloration: Skin is not jaundiced.      Findings: No lesion.   Neurological:      General: No focal deficit present.      Mental Status: He is alert and oriented to person, place, and time. Mental status is at baseline.   Psychiatric:         Mood and Affect: Mood normal.         Behavior: Behavior normal.         Thought Content: Thought content normal.         Judgment: Judgment normal.       Fluids    Intake/Output Summary (Last 24 hours) at 5/1/2023 1249  Last data filed at 5/1/2023 1107  Gross per 24 hour   Intake 621.63 ml   Output 6600 ml   Net -5978.37 ml       Laboratory  Recent Labs     04/29/23 2230 04/30/23  0302 05/01/23  0427   WBC 21.5* 18.9* 9.7   RBC 4.86 4.84 4.61*   HEMOGLOBIN 15.7 15.4 14.6   HEMATOCRIT 45.9 47.0 45.4   MCV 94.4* 97.1 98.5*   MCH 32.3* 31.8 31.7   MCHC 34.2 32.8* 32.2*   RDW 13.6 48.3 50.9*   PLATELETCT 292 317 271   MPV 10.6* 10.2 10.4     Recent Labs     04/29/23 2230 04/30/23  0302  05/01/23  0427   SODIUM 134* 139 142   POTASSIUM 4.5 4.6 3.9   CHLORIDE 96* 102 106   CO2 22 22 24   GLUCOSE 190* 187* 142*   BUN 35* 38* 42*   CREATININE 2.1* 2.14* 1.52*   CALCIUM 10.1 9.7 8.3*                   Imaging  OUTSIDE IMAGES-CT ABDOMEN /PELVIS   Final Result      OUTSIDE IMAGES-DX CHEST   Final Result      EC-ECHOCARDIOGRAM COMPLETE W/O CONT    (Results Pending)        Assessment/Plan  Problem Representation:    * SBO (small bowel obstruction) ([facility])- (present on admission)  Assessment & Plan  Patient's had multiple abdominal surgeries in the past most recently in February of this year for hernia evaluation/management and removal of adhesions.  His last bowel movement was the day prior to admission.  -Lactic acid was elevated at admission which was concerning for incarcerated hernia versus bowel ischemia.  This has since resolved (Trend 4.6 to 1.8.)  -Continuing IV Zosyn   -Continuing IV fluids with NS at 83 mL/hr  -Pain control:  decreasing to Dilaudid 0.5 mg every 4 hours as needed.  Unable to get Oxy at this time due to n.p.o. status/NG tube  -N.p.o.  -Surgery is following; appreciate recommendations.  Appears no need for acute intervention at this time.    Acute respiratory failure with hypoxia ([facility])  Assessment & Plan  On 4 L of O2 above his baseline. appears to be a component of HEATHER but because of NG tube proper seal cannot be completed; patient given oxygen supplementation as compromised.  Patient denies cough, shortness of breath.  Patient only endorses hypoxia when napping, sleeping.  -Can encourage ISS and ambulation    Type 2 diabetes mellitus, without long-term current use of insulin ([facility])- (present on admission)  Assessment & Plan  Start on insulin sliding scale with serial Accu-Checks  Hemoglobin A1c was 6.2  Hypoglycemic protocol in place        SUHAS (acute kidney injury) ([facility])- (present on admission)  Assessment & Plan  Likely prerenal given loss of appetite prior, current n.p.o.  status  -Continue IV fluid resuscitation as above.    -Monitor BMP and assess response  -Avoid IV contrast/nephrotoxins/NSAIDs. Dose adjust meds for decreased GFR        Elevated lactic acid level- (present on admission)  Assessment & Plan  Concern for incarceration or bowel ischemia.  Trend 4.6 to a normal level of 1.8.    Systolic congestive heart failure ([facility])- (present on admission)  Assessment & Plan  Appears to be not in exacerbation.  Monitor volume status  Repeat cardiac echo  Monitor on telemetry  Holding Entresto    Atrial fibrillation with RVR (HCC)  Assessment & Plan  Patient was found to have a heart rate greater than 130 in ER and was given another dose of IV metoprolol  Patient is n.p.o.  IV metoprolol has been ordered    Pradaxa was reversed in the ER.  Holding anticoagulation in case of imminent procedure.    HEATHER (obstructive sleep apnea)- (present on admission)  Assessment & Plan  CPAP machine.  Unable to continue at this time due to the NG tube and inability to make a proper seal.    HTN (hypertension)- (present on admission)  Assessment & Plan  Patient is n.p.o.  IV prn medications have been ordered         VTE prophylaxis: SCDs/TEDs and pharmacologic prophylaxis contraindicated due to possible procedure.    I have performed a physical exam and reviewed and updated ROS and Plan today (5/1/2023). In review of yesterday's note (4/30/2023), there are no changes except as documented above.

## 2023-05-01 NOTE — CARE PLAN
The patient is Watcher - Medium risk of patient condition declining or worsening    Shift Goals  Clinical Goals: Pain control  Patient Goals: pain control  Family Goals: JAMI    Progress made toward(s) clinical / shift goals:    Problem: Skin Integrity  Goal: Skin integrity is maintained or improved  Outcome: Progressing  Note: Pt turns self from side to side.  Pt educated about the importance of frequent repositioning to prevent skin breakdown. Encouraged turning in bed and notifying staff for help. Pt verbalized understanding. Pt cleaned and linens changed frequently as appropriate.           Problem: Pain - Standard  Goal: Alleviation of pain or a reduction in pain to the patient’s comfort goal  Outcome: Progressing       Patient is not progressing towards the following goals:

## 2023-05-01 NOTE — CARE PLAN
The patient is Stable - Low risk of patient condition declining or worsening    Shift Goals  Clinical Goals: improvement in SBO, pain control, VSS  Patient Goals: pain control  Family Goals: JAMI    Progress made toward(s) clinical / shift goals:    Problem: Knowledge Deficit - Standard  Goal: Patient and family/care givers will demonstrate understanding of plan of care, disease process/condition, diagnostic tests and medications  Outcome: Progressing  Note: Pt updated on POC. NGT to low-int sx with bile/brown output. Plan to keep NPO, NGT to sx and wean opioids.      Problem: Skin Integrity  Goal: Skin integrity is maintained or improved  Outcome: Progressing  Note: Skin intact. Placed on waffle cushion for back pain.      Problem: Fall Risk  Goal: Patient will remain free from falls  Outcome: Progressing  Note: Pt w/out fall this shift.      Problem: Pain - Standard  Goal: Alleviation of pain or a reduction in pain to the patient’s comfort goal  Outcome: Progressing  Note: Pt w/ primary back pain. Controlled w/ PRN pain meds. Starting to wean O2.

## 2023-05-02 ENCOUNTER — APPOINTMENT (OUTPATIENT)
Dept: CARDIOLOGY | Facility: MEDICAL CENTER | Age: 63
DRG: 388 | End: 2023-05-02
Attending: SURGERY
Payer: MEDICARE

## 2023-05-02 LAB
ALBUMIN SERPL BCP-MCNC: 3.3 G/DL (ref 3.2–4.9)
ALBUMIN/GLOB SERPL: 1.1 G/DL
ALP SERPL-CCNC: 43 U/L (ref 30–99)
ALT SERPL-CCNC: 29 U/L (ref 2–50)
ANION GAP SERPL CALC-SCNC: 15 MMOL/L (ref 7–16)
APTT PPP: 30.6 SEC (ref 24.7–36)
AST SERPL-CCNC: 76 U/L (ref 12–45)
BILIRUB SERPL-MCNC: 0.6 MG/DL (ref 0.1–1.5)
BUN SERPL-MCNC: 26 MG/DL (ref 8–22)
CALCIUM ALBUM COR SERPL-MCNC: 8.9 MG/DL (ref 8.5–10.5)
CALCIUM SERPL-MCNC: 8.3 MG/DL (ref 8.5–10.5)
CHLORIDE SERPL-SCNC: 106 MMOL/L (ref 96–112)
CO2 SERPL-SCNC: 21 MMOL/L (ref 20–33)
CREAT SERPL-MCNC: 0.97 MG/DL (ref 0.5–1.4)
ERYTHROCYTE [DISTWIDTH] IN BLOOD BY AUTOMATED COUNT: 50.9 FL (ref 35.9–50)
GFR SERPLBLD CREATININE-BSD FMLA CKD-EPI: 86 ML/MIN/1.73 M 2
GLOBULIN SER CALC-MCNC: 3 G/DL (ref 1.9–3.5)
GLUCOSE BLD STRIP.AUTO-MCNC: 100 MG/DL (ref 65–99)
GLUCOSE BLD STRIP.AUTO-MCNC: 117 MG/DL (ref 65–99)
GLUCOSE BLD STRIP.AUTO-MCNC: 121 MG/DL (ref 65–99)
GLUCOSE BLD STRIP.AUTO-MCNC: 86 MG/DL (ref 65–99)
GLUCOSE SERPL-MCNC: 101 MG/DL (ref 65–99)
HCT VFR BLD AUTO: 45.8 % (ref 42–52)
HGB BLD-MCNC: 14.5 G/DL (ref 14–18)
INR PPP: 1.17 (ref 0.87–1.13)
MAGNESIUM SERPL-MCNC: 2.4 MG/DL (ref 1.5–2.5)
MCH RBC QN AUTO: 31.4 PG (ref 27–33)
MCHC RBC AUTO-ENTMCNC: 31.7 G/DL (ref 33.7–35.3)
MCV RBC AUTO: 99.1 FL (ref 81.4–97.8)
PLATELET # BLD AUTO: 259 K/UL (ref 164–446)
PMV BLD AUTO: 10.5 FL (ref 9–12.9)
POTASSIUM SERPL-SCNC: 4.3 MMOL/L (ref 3.6–5.5)
PROT SERPL-MCNC: 6.3 G/DL (ref 6–8.2)
PROTHROMBIN TIME: 14.8 SEC (ref 12–14.6)
RBC # BLD AUTO: 4.62 M/UL (ref 4.7–6.1)
SODIUM SERPL-SCNC: 142 MMOL/L (ref 135–145)
UFH PPP CHRO-ACNC: 0.34 IU/ML
UFH PPP CHRO-ACNC: <0.1 IU/ML
WBC # BLD AUTO: 9.4 K/UL (ref 4.8–10.8)

## 2023-05-02 PROCEDURE — 85730 THROMBOPLASTIN TIME PARTIAL: CPT

## 2023-05-02 PROCEDURE — 770020 HCHG ROOM/CARE - TELE (206)

## 2023-05-02 PROCEDURE — 36415 COLL VENOUS BLD VENIPUNCTURE: CPT

## 2023-05-02 PROCEDURE — 700111 HCHG RX REV CODE 636 W/ 250 OVERRIDE (IP): Performed by: INTERNAL MEDICINE

## 2023-05-02 PROCEDURE — 80053 COMPREHEN METABOLIC PANEL: CPT

## 2023-05-02 PROCEDURE — 700111 HCHG RX REV CODE 636 W/ 250 OVERRIDE (IP): Performed by: STUDENT IN AN ORGANIZED HEALTH CARE EDUCATION/TRAINING PROGRAM

## 2023-05-02 PROCEDURE — 99232 SBSQ HOSP IP/OBS MODERATE 35: CPT | Performed by: PHYSICIAN ASSISTANT

## 2023-05-02 PROCEDURE — 83735 ASSAY OF MAGNESIUM: CPT

## 2023-05-02 PROCEDURE — 85610 PROTHROMBIN TIME: CPT

## 2023-05-02 PROCEDURE — 99232 SBSQ HOSP IP/OBS MODERATE 35: CPT | Mod: GC | Performed by: INTERNAL MEDICINE

## 2023-05-02 PROCEDURE — 700105 HCHG RX REV CODE 258: Performed by: PHYSICIAN ASSISTANT

## 2023-05-02 PROCEDURE — 700105 HCHG RX REV CODE 258: Performed by: HOSPITALIST

## 2023-05-02 PROCEDURE — 700111 HCHG RX REV CODE 636 W/ 250 OVERRIDE (IP)

## 2023-05-02 PROCEDURE — 85027 COMPLETE CBC AUTOMATED: CPT

## 2023-05-02 PROCEDURE — 700111 HCHG RX REV CODE 636 W/ 250 OVERRIDE (IP): Performed by: PHYSICIAN ASSISTANT

## 2023-05-02 PROCEDURE — 85520 HEPARIN ASSAY: CPT | Mod: 91

## 2023-05-02 PROCEDURE — 700102 HCHG RX REV CODE 250 W/ 637 OVERRIDE(OP): Performed by: STUDENT IN AN ORGANIZED HEALTH CARE EDUCATION/TRAINING PROGRAM

## 2023-05-02 PROCEDURE — A9270 NON-COVERED ITEM OR SERVICE: HCPCS

## 2023-05-02 PROCEDURE — 700111 HCHG RX REV CODE 636 W/ 250 OVERRIDE (IP): Performed by: SURGERY

## 2023-05-02 PROCEDURE — 700102 HCHG RX REV CODE 250 W/ 637 OVERRIDE(OP)

## 2023-05-02 PROCEDURE — 700105 HCHG RX REV CODE 258: Performed by: SURGERY

## 2023-05-02 PROCEDURE — A9270 NON-COVERED ITEM OR SERVICE: HCPCS | Performed by: STUDENT IN AN ORGANIZED HEALTH CARE EDUCATION/TRAINING PROGRAM

## 2023-05-02 PROCEDURE — 82962 GLUCOSE BLOOD TEST: CPT

## 2023-05-02 RX ORDER — HEPARIN SODIUM 5000 [USP'U]/100ML
0-30 INJECTION, SOLUTION INTRAVENOUS CONTINUOUS
Status: DISCONTINUED | OUTPATIENT
Start: 2023-05-02 | End: 2023-05-02

## 2023-05-02 RX ORDER — HEPARIN SODIUM 1000 [USP'U]/ML
40 INJECTION, SOLUTION INTRAVENOUS; SUBCUTANEOUS PRN
Status: DISCONTINUED | OUTPATIENT
Start: 2023-05-02 | End: 2023-05-03 | Stop reason: HOSPADM

## 2023-05-02 RX ORDER — HEPARIN SODIUM 5000 [USP'U]/100ML
0-30 INJECTION, SOLUTION INTRAVENOUS CONTINUOUS
Status: DISCONTINUED | OUTPATIENT
Start: 2023-05-02 | End: 2023-05-03 | Stop reason: HOSPADM

## 2023-05-02 RX ORDER — ACETAMINOPHEN 325 MG/1
650 TABLET ORAL EVERY 4 HOURS PRN
Status: DISCONTINUED | OUTPATIENT
Start: 2023-05-02 | End: 2023-05-03 | Stop reason: HOSPADM

## 2023-05-02 RX ORDER — POLYETHYLENE GLYCOL 3350 17 G/17G
1 POWDER, FOR SOLUTION ORAL DAILY
Status: DISCONTINUED | OUTPATIENT
Start: 2023-05-02 | End: 2023-05-03 | Stop reason: HOSPADM

## 2023-05-02 RX ADMIN — HEPARIN SODIUM 18 UNITS/KG/HR: 5000 INJECTION, SOLUTION INTRAVENOUS at 23:09

## 2023-05-02 RX ADMIN — PIPERACILLIN AND TAZOBACTAM 4.5 G: 4; .5 INJECTION, POWDER, LYOPHILIZED, FOR SOLUTION INTRAVENOUS; PARENTERAL at 20:45

## 2023-05-02 RX ADMIN — HYDROMORPHONE HYDROCHLORIDE 0.5 MG: 1 INJECTION, SOLUTION INTRAMUSCULAR; INTRAVENOUS; SUBCUTANEOUS at 02:07

## 2023-05-02 RX ADMIN — HYDROMORPHONE HYDROCHLORIDE 0.5 MG: 1 INJECTION, SOLUTION INTRAMUSCULAR; INTRAVENOUS; SUBCUTANEOUS at 22:46

## 2023-05-02 RX ADMIN — OXYCODONE 5 MG: 5 TABLET ORAL at 20:21

## 2023-05-02 RX ADMIN — PSYLLIUM HUSK 1 PACKET: 3.4 POWDER ORAL at 15:47

## 2023-05-02 RX ADMIN — METHOCARBAMOL 1000 MG: 100 INJECTION INTRAMUSCULAR; INTRAVENOUS at 05:07

## 2023-05-02 RX ADMIN — OXYCODONE HYDROCHLORIDE 10 MG: 10 TABLET ORAL at 10:14

## 2023-05-02 RX ADMIN — POLYETHYLENE GLYCOL 3350 1 PACKET: 17 POWDER, FOR SOLUTION ORAL at 15:47

## 2023-05-02 RX ADMIN — PIPERACILLIN AND TAZOBACTAM 4.5 G: 4; .5 INJECTION, POWDER, LYOPHILIZED, FOR SOLUTION INTRAVENOUS; PARENTERAL at 12:53

## 2023-05-02 RX ADMIN — PIPERACILLIN AND TAZOBACTAM 4.5 G: 4; .5 INJECTION, POWDER, LYOPHILIZED, FOR SOLUTION INTRAVENOUS; PARENTERAL at 05:06

## 2023-05-02 RX ADMIN — OXYCODONE HYDROCHLORIDE 10 MG: 10 TABLET ORAL at 14:14

## 2023-05-02 RX ADMIN — ENOXAPARIN SODIUM 150 MG: 150 INJECTION SUBCUTANEOUS at 05:11

## 2023-05-02 RX ADMIN — HYDROMORPHONE HYDROCHLORIDE 0.5 MG: 1 INJECTION, SOLUTION INTRAMUSCULAR; INTRAVENOUS; SUBCUTANEOUS at 06:13

## 2023-05-02 RX ADMIN — METHOCARBAMOL 1000 MG: 100 INJECTION INTRAMUSCULAR; INTRAVENOUS at 15:48

## 2023-05-02 RX ADMIN — SODIUM CHLORIDE: 9 INJECTION, SOLUTION INTRAVENOUS at 05:03

## 2023-05-02 ASSESSMENT — ENCOUNTER SYMPTOMS
VOMITING: 0
BLOOD IN STOOL: 0
CONSTIPATION: 0
CHILLS: 0
INSOMNIA: 0
SEIZURES: 0
COUGH: 0
FEVER: 0
WHEEZING: 0
HEARTBURN: 0
SHORTNESS OF BREATH: 0
ABDOMINAL PAIN: 0
DIARRHEA: 0
NAUSEA: 0
LOSS OF CONSCIOUSNESS: 0
PALPITATIONS: 0

## 2023-05-02 ASSESSMENT — PAIN DESCRIPTION - PAIN TYPE
TYPE: ACUTE PAIN

## 2023-05-02 ASSESSMENT — PATIENT HEALTH QUESTIONNAIRE - PHQ9
1. LITTLE INTEREST OR PLEASURE IN DOING THINGS: NOT AT ALL
SUM OF ALL RESPONSES TO PHQ9 QUESTIONS 1 AND 2: 0

## 2023-05-02 ASSESSMENT — LIFESTYLE VARIABLES: SUBSTANCE_ABUSE: 0

## 2023-05-02 ASSESSMENT — FIBROSIS 4 INDEX: FIB4 SCORE: 3.54

## 2023-05-02 NOTE — PROGRESS NOTES
Telemetry Shift Summary     Rhythm:A fib  HR: ***  Ectopy: ***      Normal values:   Rhythm: SR  HR:   Measurements: 0.12-0.20/0.08-0.10/0.30-0.52

## 2023-05-02 NOTE — PROGRESS NOTES
"  DATE: 5/2/2023    Hospital Day 3 admitted with SBO.    INTERVAL EVENTS:  Feeling better.      ROS:  Review of systems is remarkable for the following no abdominal pain, nausea, vomiting. The remainder of the comprehensive ROS is negative with the exception of the aforementioned HPI, PMH, and PSH bullets in accordance with CMS guideline.    PHYSICAL EXAMINATION:  Vital Signs: BP (!) 180/89   Pulse 95   Temp 35.9 °C (96.6 °F) (Temporal)   Resp 16   Ht 1.778 m (5' 10\")   Wt (!) 133 kg (293 lb 14 oz)   SpO2 92%     Physical Exam  Abdominal:      General: Abdomen is protuberant. A surgical scar is present. There is distension.      Palpations: Abdomen is soft.      Tenderness: There is no abdominal tenderness.      Hernia: A hernia is present. Hernia is present in the ventral area.       LABORATORY VALUES:   Recent Labs     04/30/23 0302 05/01/23 0427 05/02/23  0234   WBC 18.9* 9.7 9.4   RBC 4.84 4.61* 4.62*   HEMOGLOBIN 15.4 14.6 14.5   HEMATOCRIT 47.0 45.4 45.8   MCV 97.1 98.5* 99.1*   MCH 31.8 31.7 31.4   MCHC 32.8* 32.2* 31.7*   RDW 48.3 50.9* 50.9*   PLATELETCT 317 271 259   MPV 10.2 10.4 10.5     Recent Labs     04/30/23 0302 05/01/23 0427 05/02/23  0234   SODIUM 139 142 142   POTASSIUM 4.6 3.9 4.3   CHLORIDE 102 106 106   CO2 22 24 21   GLUCOSE 187* 142* 101*   BUN 38* 42* 26*   CREATININE 2.14* 1.52* 0.97   CALCIUM 9.7 8.3* 8.3*     Recent Labs     04/30/23 0302 05/01/23 0427 05/02/23  0234   ASTSGOT 34 45 76*   ALTSGPT 27 21 29   TBILIRUBIN 1.5 0.9 0.6   ALKPHOSPHAT 63 44 43   GLOBULIN 3.4 2.9 3.0            IMAGING:   OUTSIDE IMAGES-CT ABDOMEN /PELVIS   Final Result      OUTSIDE IMAGES-DX CHEST   Final Result      EC-ECHOCARDIOGRAM COMPLETE W/O CONT    (Results Pending)       ASSESSMENT AND PLAN:   * SBO (small bowel obstruction) (HCC)- (present on admission)  Assessment & Plan  CT with small bowel obstruction and portal venous gas.    Lactate has corrected with fluid resuscitation.   Reverse " anticoagulation, NG decompression and NPO.    Patient is high risk for surgery should he fail to resolve his obstruction.  Unclear if compromised bowel in the face of normalized lactate.  Abx for portal venous gas and pneumatosis. Close observation.  5/1 Nausea improved, passing flatus.  D/C G tube, start clears.  5/2 Full liquid diet.      - Advance to full liquids  - Mobilize as tolerated  - [facility] service following         ____________________________________     Maylin Brown P.A.-C.    DD: 4/30/2023  12:43 PM

## 2023-05-02 NOTE — CARE PLAN
Patient A/O x4; V/S stable; afebrile on IV fluids, on Iv antibiotics and muscle relaxants; Complained of abdominal and back pain; relieved by IV dilaudid; NGT discontinued as per Dr's order; Progress diet to Clear liquid;as ordered; tolerated without nausea or vomiting; Abdomen large rounded; Hernia on RLQ;Pt turns from side to side; hypoactive bowel sounds; safety measures explained; advised to use call light      The patient is Stable - Low risk of patient condition declining or worsening    Shift Goals  Clinical Goals: Improved gastro status; Pain control; no N/V  Patient Goals: Pain control  Family Goals: JAMI    Progress made toward(s) clinical / shift goals:    Problem: Knowledge Deficit - Standard  Goal: Patient and family/care givers will demonstrate understanding of plan of care, disease process/condition, diagnostic tests and medications  Outcome: Progressing     Problem: Skin Integrity  Goal: Skin integrity is maintained or improved  Outcome: Progressing     Problem: Fall Risk  Goal: Patient will remain free from falls  Outcome: Progressing     Problem: Pain - Standard  Goal: Alleviation of pain or a reduction in pain to the patient’s comfort goal  Outcome: Progressing       Patient is not progressing towards the following goals:

## 2023-05-02 NOTE — PROGRESS NOTES
[facility] Internal Medicine Daily Progress Note    Date of Service  5/2/2023    R Team: R IM Vasquez Team   Attending: Alirio Berkowitz M.d.  Senior Resident: Dr. Artis Hills  Intern:  Dr. Jesse Santiago  Contact Number: [phone]    Chief Complaint  Yonny Wallace is a 63 y.o. male admitted 4/30/2023 with small bowel obstruction, concern for ischemic bowel within hernia.    Hospital Course  No notes on file    Interval Problem Update  -No acute events overnight  -Patient now tolerating clear liquid diet.  Notes continued improvement in abdominal pain, nausea vomiting.  Patient notes small bowel movement this morning.  -Surgery following; will still no plans for any acute intervention.  -Patient using more oxycodone as a de-escalation from Dilaudid which was IV.    I have discussed this patient's plan of care and discharge plan at IDT rounds today with Case Management, Nursing, Nursing leadership, and other members of the IDT team.    Consultants/Specialty  general surgery    Code Status  Full Code    Disposition  Patient is not medically cleared for discharge.   Anticipate discharge to to home with close outpatient follow-up.  I have placed the appropriate orders for post-discharge needs.    Review of Systems  Review of Systems   Constitutional:  Negative for chills, fever and malaise/fatigue.   Respiratory:  Negative for cough, shortness of breath and wheezing.    Cardiovascular:  Negative for chest pain, palpitations and leg swelling.   Gastrointestinal:  Negative for abdominal pain, blood in stool, constipation, diarrhea, heartburn, melena, nausea and vomiting.   Neurological:  Negative for seizures and loss of consciousness.   Psychiatric/Behavioral:  Negative for substance abuse. The patient does not have insomnia.       Physical Exam  Temp:  [35.8 °C (96.5 °F)-36.7 °C (98.1 °F)] 35.9 °C (96.6 °F)  Pulse:  [] 95  Resp:  [16-20] 16  BP: (130-180)/() 180/89  SpO2:  [92 %-98 %] 92 %    Physical  Exam  Vitals and nursing note reviewed.   Constitutional:       General: He is not in acute distress.     Appearance: Normal appearance.   HENT:      Head: Normocephalic and atraumatic.      Right Ear: External ear normal.      Left Ear: External ear normal.   Eyes:      General:         Right eye: No discharge.         Left eye: No discharge.   Cardiovascular:      Rate and Rhythm: Normal rate and regular rhythm.      Pulses: Normal pulses.      Heart sounds: Normal heart sounds.   Pulmonary:      Effort: Pulmonary effort is normal. No respiratory distress.      Breath sounds: Normal breath sounds.   Abdominal:      General: There is no distension.      Palpations: There is no mass.      Tenderness: There is no abdominal tenderness. There is no guarding or rebound.      Hernia: A hernia is present.          Comments:  Large hernia present in the  right lower quadrant.  Appears irreducible, but also without tenderness, guarding, rebound tenderness.   Musculoskeletal:         General: No swelling.      Right lower leg: No edema.      Left lower leg: No edema.   Skin:     Coloration: Skin is not jaundiced.      Findings: No lesion.   Neurological:      General: No focal deficit present.      Mental Status: He is alert and oriented to person, place, and time. Mental status is at baseline.   Psychiatric:         Mood and Affect: Mood normal.         Behavior: Behavior normal.         Thought Content: Thought content normal.         Judgment: Judgment normal.       Fluids    Intake/Output Summary (Last 24 hours) at 5/2/2023 1514  Last data filed at 5/2/2023 1220  Gross per 24 hour   Intake 520.86 ml   Output 1070 ml   Net -549.14 ml       Laboratory  Recent Labs     04/30/23  0302 05/01/23  0427 05/02/23  0234   WBC 18.9* 9.7 9.4   RBC 4.84 4.61* 4.62*   HEMOGLOBIN 15.4 14.6 14.5   HEMATOCRIT 47.0 45.4 45.8   MCV 97.1 98.5* 99.1*   MCH 31.8 31.7 31.4   MCHC 32.8* 32.2* 31.7*   RDW 48.3 50.9* 50.9*   PLATELETCT 317 271  259   MPV 10.2 10.4 10.5     Recent Labs     04/30/23  0302 05/01/23  0427 05/02/23  0234   SODIUM 139 142 142   POTASSIUM 4.6 3.9 4.3   CHLORIDE 102 106 106   CO2 22 24 21   GLUCOSE 187* 142* 101*   BUN 38* 42* 26*   CREATININE 2.14* 1.52* 0.97   CALCIUM 9.7 8.3* 8.3*                   Imaging  OUTSIDE IMAGES-CT ABDOMEN /PELVIS   Final Result      OUTSIDE IMAGES-DX CHEST   Final Result      EC-ECHOCARDIOGRAM COMPLETE W/O CONT    (Results Pending)        Assessment/Plan  Problem Representation:    * SBO (small bowel obstruction) (HCC)- (present on admission)  Assessment & Plan  Patient's had multiple abdominal surgeries in the past most recently in February of this year for hernia evaluation/management and removal of adhesions.  His last bowel movement was the day prior to admission.  -Lactic acid was elevated at admission which was concerning for incarcerated hernia versus bowel ischemia.  This has since resolved (Trend 4.6 to 1.8.)  --Continuing IV Zosyn   -Continuing IV fluids with NS at 83 mL/hr  -Pain control: Patient we will be de-escalating to oral Oxy from pure IV Dilaudid as pain appears to be improving.  --Tolerating clear liquid diet.  Advancing diet as tolerated to high-fiber diet.  -Surgery is following; appreciate recommendations.  Appears no need for acute intervention at this time.    Acute respiratory failure with hypoxia (HCC)  Assessment & Plan  On 4 L of O2 above his baseline. appears to be a component of HEATHER but because of NG tube proper seal cannot be completed; patient given oxygen supplementation as compromised.  Patient denies cough, shortness of breath.  Patient only endorses hypoxia when napping, sleeping.  -Can encourage ISS and ambulation    Type 2 diabetes mellitus, without long-term current use of insulin (HCC)- (present on admission)  Assessment & Plan  Start on insulin sliding scale with serial Accu-Checks  Hemoglobin A1c was 6.2  Hypoglycemic protocol in place        SUHAS (acute  kidney injury) (HCC)- (present on admission)  Assessment & Plan  Likely prerenal given loss of appetite prior, prior n.p.o. status  -Continue IV fluid resuscitation as above.    -Monitor BMP and assess response  -Avoid IV contrast/nephrotoxins/NSAIDs. Dose adjust meds for decreased GFR        Elevated lactic acid level- (present on admission)  Assessment & Plan  Concern for incarceration or bowel ischemia.  Trend 4.6 to a normal level of 1.8.    Systolic congestive heart failure (HCC)- (present on admission)  Assessment & Plan  Appears to be not in exacerbation.  Monitor volume status  Repeat cardiac echo  Monitor on telemetry  Holding Entresto    Atrial fibrillation with RVR (HCC)  Assessment & Plan  Patient was found to have a heart rate greater than 130 in ER and was given another dose of IV metoprolol  IV metoprolol has been ordered    Pradaxa was reversed in the ER.  Restarting anticoagulation with heparin drip.    HEATHER (obstructive sleep apnea)- (present on admission)  Assessment & Plan  CPAP machine at home    HTN (hypertension)- (present on admission)  Assessment & Plan  IV prn medications have been ordered         VTE prophylaxis: SCDs/TEDs and pharmacologic prophylaxis contraindicated due to possible procedure.    I have performed a physical exam and reviewed and updated ROS and Plan today (5/2/2023). In review of yesterday's note (5/1/2023), there are no changes except as documented above.

## 2023-05-03 ENCOUNTER — APPOINTMENT (OUTPATIENT)
Dept: CARDIOLOGY | Facility: MEDICAL CENTER | Age: 63
DRG: 388 | End: 2023-05-03
Attending: SURGERY
Payer: MEDICARE

## 2023-05-03 ENCOUNTER — PATIENT OUTREACH (OUTPATIENT)
Dept: SCHEDULING | Facility: IMAGING CENTER | Age: 63
End: 2023-05-03
Payer: MEDICARE

## 2023-05-03 VITALS
BODY MASS INDEX: 42.07 KG/M2 | RESPIRATION RATE: 20 BRPM | OXYGEN SATURATION: 95 % | SYSTOLIC BLOOD PRESSURE: 172 MMHG | HEIGHT: 70 IN | TEMPERATURE: 97.2 F | DIASTOLIC BLOOD PRESSURE: 89 MMHG | HEART RATE: 62 BPM | WEIGHT: 293.87 LBS

## 2023-05-03 LAB
ALBUMIN SERPL BCP-MCNC: 3.5 G/DL (ref 3.2–4.9)
ALBUMIN/GLOB SERPL: 1.1 G/DL
ALP SERPL-CCNC: 46 U/L (ref 30–99)
ALT SERPL-CCNC: 27 U/L (ref 2–50)
ANION GAP SERPL CALC-SCNC: 11 MMOL/L (ref 7–16)
AST SERPL-CCNC: 43 U/L (ref 12–45)
BILIRUB SERPL-MCNC: 0.4 MG/DL (ref 0.1–1.5)
BUN SERPL-MCNC: 17 MG/DL (ref 8–22)
CALCIUM ALBUM COR SERPL-MCNC: 9.2 MG/DL (ref 8.5–10.5)
CALCIUM SERPL-MCNC: 8.8 MG/DL (ref 8.5–10.5)
CHLORIDE SERPL-SCNC: 104 MMOL/L (ref 96–112)
CO2 SERPL-SCNC: 21 MMOL/L (ref 20–33)
CREAT SERPL-MCNC: 0.87 MG/DL (ref 0.5–1.4)
ERYTHROCYTE [DISTWIDTH] IN BLOOD BY AUTOMATED COUNT: 47.3 FL (ref 35.9–50)
GFR SERPLBLD CREATININE-BSD FMLA CKD-EPI: 96 ML/MIN/1.73 M 2
GLOBULIN SER CALC-MCNC: 3.2 G/DL (ref 1.9–3.5)
GLUCOSE BLD STRIP.AUTO-MCNC: 106 MG/DL (ref 65–99)
GLUCOSE BLD STRIP.AUTO-MCNC: 116 MG/DL (ref 65–99)
GLUCOSE SERPL-MCNC: 163 MG/DL (ref 65–99)
HCT VFR BLD AUTO: 44.1 % (ref 42–52)
HGB BLD-MCNC: 14.5 G/DL (ref 14–18)
LV EJECT FRACT  99904: 55
MAGNESIUM SERPL-MCNC: 2.2 MG/DL (ref 1.5–2.5)
MCH RBC QN AUTO: 32.1 PG (ref 27–33)
MCHC RBC AUTO-ENTMCNC: 32.9 G/DL (ref 33.7–35.3)
MCV RBC AUTO: 97.6 FL (ref 81.4–97.8)
PLATELET # BLD AUTO: 303 K/UL (ref 164–446)
PMV BLD AUTO: 10.8 FL (ref 9–12.9)
POTASSIUM SERPL-SCNC: 4.1 MMOL/L (ref 3.6–5.5)
PROT SERPL-MCNC: 6.7 G/DL (ref 6–8.2)
RBC # BLD AUTO: 4.52 M/UL (ref 4.7–6.1)
SODIUM SERPL-SCNC: 136 MMOL/L (ref 135–145)
UFH PPP CHRO-ACNC: 0.73 IU/ML
WBC # BLD AUTO: 10.6 K/UL (ref 4.8–10.8)

## 2023-05-03 PROCEDURE — 700111 HCHG RX REV CODE 636 W/ 250 OVERRIDE (IP): Performed by: HOSPITALIST

## 2023-05-03 PROCEDURE — 700105 HCHG RX REV CODE 258: Performed by: SURGERY

## 2023-05-03 PROCEDURE — 700101 HCHG RX REV CODE 250: Performed by: HOSPITALIST

## 2023-05-03 PROCEDURE — 93306 TTE W/DOPPLER COMPLETE: CPT | Mod: 26 | Performed by: INTERNAL MEDICINE

## 2023-05-03 PROCEDURE — 36415 COLL VENOUS BLD VENIPUNCTURE: CPT

## 2023-05-03 PROCEDURE — 99239 HOSP IP/OBS DSCHRG MGMT >30: CPT | Mod: GC | Performed by: INTERNAL MEDICINE

## 2023-05-03 PROCEDURE — 700111 HCHG RX REV CODE 636 W/ 250 OVERRIDE (IP): Performed by: SURGERY

## 2023-05-03 PROCEDURE — 700102 HCHG RX REV CODE 250 W/ 637 OVERRIDE(OP): Performed by: STUDENT IN AN ORGANIZED HEALTH CARE EDUCATION/TRAINING PROGRAM

## 2023-05-03 PROCEDURE — 85520 HEPARIN ASSAY: CPT

## 2023-05-03 PROCEDURE — 700111 HCHG RX REV CODE 636 W/ 250 OVERRIDE (IP): Performed by: PHYSICIAN ASSISTANT

## 2023-05-03 PROCEDURE — 82962 GLUCOSE BLOOD TEST: CPT | Mod: 91

## 2023-05-03 PROCEDURE — 700105 HCHG RX REV CODE 258: Performed by: PHYSICIAN ASSISTANT

## 2023-05-03 PROCEDURE — 80053 COMPREHEN METABOLIC PANEL: CPT

## 2023-05-03 PROCEDURE — 85027 COMPLETE CBC AUTOMATED: CPT

## 2023-05-03 PROCEDURE — A9270 NON-COVERED ITEM OR SERVICE: HCPCS | Performed by: STUDENT IN AN ORGANIZED HEALTH CARE EDUCATION/TRAINING PROGRAM

## 2023-05-03 PROCEDURE — 700111 HCHG RX REV CODE 636 W/ 250 OVERRIDE (IP): Performed by: INTERNAL MEDICINE

## 2023-05-03 PROCEDURE — 93306 TTE W/DOPPLER COMPLETE: CPT

## 2023-05-03 PROCEDURE — 83735 ASSAY OF MAGNESIUM: CPT

## 2023-05-03 PROCEDURE — 94660 CPAP INITIATION&MGMT: CPT

## 2023-05-03 PROCEDURE — 99232 SBSQ HOSP IP/OBS MODERATE 35: CPT | Performed by: SURGERY

## 2023-05-03 RX ORDER — METOPROLOL SUCCINATE 100 MG/1
100 TABLET, EXTENDED RELEASE ORAL DAILY
Qty: 30 TABLET | Refills: 1 | Status: SHIPPED | OUTPATIENT
Start: 2023-05-03

## 2023-05-03 RX ORDER — AMOXICILLIN AND CLAVULANATE POTASSIUM 875; 125 MG/1; MG/1
1 TABLET, FILM COATED ORAL 2 TIMES DAILY
Qty: 7 TABLET | Refills: 0 | Status: ACTIVE | OUTPATIENT
Start: 2023-05-03 | End: 2023-05-07

## 2023-05-03 RX ORDER — AMLODIPINE BESYLATE 5 MG/1
10 TABLET ORAL DAILY
Status: DISCONTINUED | OUTPATIENT
Start: 2023-05-03 | End: 2023-05-03 | Stop reason: HOSPADM

## 2023-05-03 RX ADMIN — AMLODIPINE BESYLATE 10 MG: 5 TABLET ORAL at 08:58

## 2023-05-03 RX ADMIN — ONDANSETRON 4 MG: 4 TABLET, ORALLY DISINTEGRATING ORAL at 04:47

## 2023-05-03 RX ADMIN — SACUBITRIL AND VALSARTAN 1 TABLET: 24; 26 TABLET, FILM COATED ORAL at 08:58

## 2023-05-03 RX ADMIN — PIPERACILLIN AND TAZOBACTAM 4.5 G: 4; .5 INJECTION, POWDER, LYOPHILIZED, FOR SOLUTION INTRAVENOUS; PARENTERAL at 04:39

## 2023-05-03 RX ADMIN — OXYCODONE HYDROCHLORIDE 10 MG: 10 TABLET ORAL at 03:24

## 2023-05-03 RX ADMIN — METHOCARBAMOL 1000 MG: 100 INJECTION INTRAMUSCULAR; INTRAVENOUS at 12:24

## 2023-05-03 RX ADMIN — HEPARIN SODIUM 3900 UNITS: 1000 INJECTION, SOLUTION INTRAVENOUS; SUBCUTANEOUS at 04:31

## 2023-05-03 RX ADMIN — METOPROLOL TARTRATE 5 MG: 1 INJECTION, SOLUTION INTRAVENOUS at 08:22

## 2023-05-03 ASSESSMENT — PAIN DESCRIPTION - PAIN TYPE
TYPE: ACUTE PAIN;CHRONIC PAIN
TYPE: ACUTE PAIN;CHRONIC PAIN

## 2023-05-03 ASSESSMENT — ENCOUNTER SYMPTOMS
CONSTIPATION: 0
NAUSEA: 0
BLOOD IN STOOL: 0
ABDOMINAL PAIN: 0
VOMITING: 0

## 2023-05-03 NOTE — PROGRESS NOTES
Surgical Progress Note    Author: Syed Fontanez M.D. Date & Time created: 5/3/2023   11:19 AM     Interval Events:  Patient doing well tolerating diet without clinical deterioration  Denies abdominal pain no nausea or vomiting continues to have stools.  No melena or hematochezia  No surgical concern for bowel ischemia or obstruction  No perceived barrier to discharge  Surgery will sign off    Review of Systems   Gastrointestinal:  Negative for abdominal pain, blood in stool, constipation, nausea and vomiting.   Hemodynamics:  Temp (24hrs), Av.2 °C (97.2 °F), Min:35.9 °C (96.6 °F), Max:36.7 °C (98 °F)  Temperature: 36.1 °C (96.9 °F)  Pulse  Av.8  Min: 78  Max: 127   Blood Pressure : (!) 173/107 (Rn notified )     Respiratory:    Respiration: 20, Pulse Oximetry: 96 %           Neuro:  GCS       Fluids:    Intake/Output Summary (Last 24 hours) at 5/3/2023 1119  Last data filed at 2023 1220  Gross per 24 hour   Intake 240 ml   Output --   Net 240 ml       Current Diet Order   Procedures    Diet Order Diet: Consistent CHO (Diabetic)     Physical Exam  Labs:  Recent Results (from the past 24 hour(s))   POCT glucose device results    Collection Time: 23 12:20 PM   Result Value Ref Range    POC Glucose, Blood 117 (H) 65 - 99 mg/dL   aPTT    Collection Time: 23  4:20 PM   Result Value Ref Range    APTT 30.6 24.7 - 36.0 sec   Prothrombin Time    Collection Time: 23  4:20 PM   Result Value Ref Range    PT 14.8 (H) 12.0 - 14.6 sec    INR 1.17 (H) 0.87 - 1.13   Heparin Xa (Unfractionated)    Collection Time: 23  4:20 PM   Result Value Ref Range    Heparin Xa (UFH) 0.34 IU/mL   POCT glucose device results    Collection Time: 23  4:43 PM   Result Value Ref Range    POC Glucose, Blood 100 (H) 65 - 99 mg/dL   Heparin Xa (Unfractionated)    Collection Time: 23 10:54 PM   Result Value Ref Range    Heparin Xa (UFH) <0.10 IU/mL   POCT glucose device results    Collection Time: 23  11:43 PM   Result Value Ref Range    POC Glucose, Blood 121 (H) 65 - 99 mg/dL   POCT glucose device results    Collection Time: 05/03/23  6:42 AM   Result Value Ref Range    POC Glucose, Blood 116 (H) 65 - 99 mg/dL   CBC WITHOUT DIFFERENTIAL    Collection Time: 05/03/23  6:43 AM   Result Value Ref Range    WBC 10.6 4.8 - 10.8 K/uL    RBC 4.52 (L) 4.70 - 6.10 M/uL    Hemoglobin 14.5 14.0 - 18.0 g/dL    Hematocrit 44.1 42.0 - 52.0 %    MCV 97.6 81.4 - 97.8 fL    MCH 32.1 27.0 - 33.0 pg    MCHC 32.9 (L) 33.7 - 35.3 g/dL    RDW 47.3 35.9 - 50.0 fL    Platelet Count 303 164 - 446 K/uL    MPV 10.8 9.0 - 12.9 fL   MAGNESIUM    Collection Time: 05/03/23  6:43 AM   Result Value Ref Range    Magnesium 2.2 1.5 - 2.5 mg/dL   Heparin Xa (Unfractionated)    Collection Time: 05/03/23  6:43 AM   Result Value Ref Range    Heparin Xa (UFH) 0.73 IU/mL   Comp Metabolic Panel    Collection Time: 05/03/23  7:51 AM   Result Value Ref Range    Sodium 136 135 - 145 mmol/L    Potassium 4.1 3.6 - 5.5 mmol/L    Chloride 104 96 - 112 mmol/L    Co2 21 20 - 33 mmol/L    Anion Gap 11.0 7.0 - 16.0    Glucose 163 (H) 65 - 99 mg/dL    Bun 17 8 - 22 mg/dL    Creatinine 0.87 0.50 - 1.40 mg/dL    Calcium 8.8 8.5 - 10.5 mg/dL    AST(SGOT) 43 12 - 45 U/L    ALT(SGPT) 27 2 - 50 U/L    Alkaline Phosphatase 46 30 - 99 U/L    Total Bilirubin 0.4 0.1 - 1.5 mg/dL    Albumin 3.5 3.2 - 4.9 g/dL    Total Protein 6.7 6.0 - 8.2 g/dL    Globulin 3.2 1.9 - 3.5 g/dL    A-G Ratio 1.1 g/dL   CORRECTED CALCIUM    Collection Time: 05/03/23  7:51 AM   Result Value Ref Range    Correct Calcium 9.2 8.5 - 10.5 mg/dL   ESTIMATED GFR    Collection Time: 05/03/23  7:51 AM   Result Value Ref Range    GFR (CKD-EPI) 96 >60 mL/min/1.73 m 2     Medical Decision Making, by Problem:  Active Hospital Problems    Diagnosis     SBO (small bowel obstruction) ([facility]) [K56.609]      Priority: High    Elevated lactic acid level [R79.89]      Priority: Medium    Acute respiratory failure with  hypoxia ([facility]) [J96.01]     Type 2 diabetes mellitus, without long-term current use of insulin (HCC) [E11.9]     SUHAS (acute kidney injury) ([facility]) [N17.9]     Systolic congestive heart failure (HCC) [I50.20]     Atrial fibrillation with RVR ([facility]) [I48.91]     HEATHER (obstructive sleep apnea) [G47.33]     HTN (hypertension) [I10]      Plan:  As noted above    Quality Measures:  Quality-Core Measures    Discussed patient condition with Patient

## 2023-05-03 NOTE — PROGRESS NOTES
"  DATE: 5/3/2023    Hospital Day 4 admitted with SBO.    INTERVAL EVENTS:  Feeling better.  +BM, tolerating full liquids.    ROS:  Review of systems is remarkable for the following no abdominal pain, nausea, vomiting. The remainder of the comprehensive ROS is negative with the exception of the aforementioned HPI, PMH, and PSH bullets in accordance with CMS guideline.    PHYSICAL EXAMINATION:  Vital Signs: BP (!) 173/107   Pulse 93   Temp 36.1 °C (96.9 °F) (Temporal)   Resp 20   Ht 1.778 m (5' 10\")   Wt (!) 133 kg (293 lb 14 oz)   SpO2 96%     Physical Exam  Abdominal:      General: Abdomen is protuberant. A surgical scar is present. There is no distension.      Palpations: Abdomen is soft.      Tenderness: There is no abdominal tenderness.      Hernia: A hernia is present. Hernia is present in the ventral area.       LABORATORY VALUES:   Recent Labs     05/01/23 0427 05/02/23 0234 05/03/23  0643   WBC 9.7 9.4 10.6   RBC 4.61* 4.62* 4.52*   HEMOGLOBIN 14.6 14.5 14.5   HEMATOCRIT 45.4 45.8 44.1   MCV 98.5* 99.1* 97.6   MCH 31.7 31.4 32.1   MCHC 32.2* 31.7* 32.9*   RDW 50.9* 50.9* 47.3   PLATELETCT 271 259 303   MPV 10.4 10.5 10.8     Recent Labs     05/01/23 0427 05/02/23 0234 05/03/23  0751   SODIUM 142 142 136   POTASSIUM 3.9 4.3 4.1   CHLORIDE 106 106 104   CO2 24 21 21   GLUCOSE 142* 101* 163*   BUN 42* 26* 17   CREATININE 1.52* 0.97 0.87   CALCIUM 8.3* 8.3* 8.8     Recent Labs     05/01/23 0427 05/02/23 0234 05/02/23  1620 05/03/23  0751   ASTSGOT 45 76*  --  43   ALTSGPT 21 29  --  27   TBILIRUBIN 0.9 0.6  --  0.4   ALKPHOSPHAT 44 43  --  46   GLOBULIN 2.9 3.0  --  3.2   INR  --   --  1.17*  --      Recent Labs     05/02/23  1620   APTT 30.6   INR 1.17*        IMAGING:   OUTSIDE IMAGES-CT ABDOMEN /PELVIS   Final Result      OUTSIDE IMAGES-DX CHEST   Final Result      EC-ECHOCARDIOGRAM COMPLETE W/O CONT    (Results Pending)       ASSESSMENT AND PLAN:   * SBO (small bowel obstruction) (HCC)- (present " on admission)  Assessment & Plan  CT with small bowel obstruction and portal venous gas.    Lactate has corrected with fluid resuscitation.   Reverse anticoagulation, NG decompression and NPO.    Patient is high risk for surgery should he fail to resolve his obstruction.  Unclear if compromised bowel in the face of normalized lactate.  Abx for portal venous gas and pneumatosis. Close observation.  5/1 Nausea improved, passing flatus.  D/C G tube, start clears.  5/2 Full liquid diet.  5/3 Regular diet.      - Advance to regular diet  - Okay for discharge from surgical standpoint  - Follow up with ACS service as needed         ____________________________________     Maylin Brown P.A.-C.    DD: 4/30/2023  12:43 PM

## 2023-05-03 NOTE — PROGRESS NOTES
Nurse rec'd BSR from night shift nurse, updated on POC. Patient denies any pain or any s/s of distress/discomfort. Nurse to resume care, call light in reach. Fall precaution in place. WCTM

## 2023-05-03 NOTE — DISCHARGE PLANNING
Case Management Discharge Planning    Admission Date: 4/30/2023  GMLOS: 4.6  ALOS: 3    6-Clicks ADL Score: 20  6-Clicks Mobility Score: 19      Anticipated Discharge Dispo: Discharge Disposition: Discharged to home/self care (01)    DME Needed: No    Action(s) Taken: Updated Provider/Nurse on Discharge Plan and OTHER    Escalations Completed: None    Medically Clear: Yes    Next Steps: None    Barriers to Discharge: None    Patient to DC home and has a ride.  Coordination of care completed with Primary RN.  No CM  needs at this time.

## 2023-05-03 NOTE — PROGRESS NOTES
Patient discharge to home; patient secured all personal belongings by self. Patient is alert and oriented, denies any pain, reviewed and educated patient on discharge instructions and handed over. Rxs called in by MD. Patient is escorted to vehicle by nursing staff via w/c.

## 2023-05-03 NOTE — DISCHARGE SUMMARY
[facility] Internal Medicine Discharge Summary    Attending: Alirio Berkowitz M.d.  Senior Resident: Dr. Artis Hills  Intern:  Dr. Jesse Santiago  Contact Number: [phone]    CHIEF COMPLAINT ON ADMISSION  Chief Complaint   Patient presents with    Abdominal Pain     Patient is IFT from [facility] with a diagnosis of a large bowel obstruction. Given 4mg Dilaudid pta, very distended abdomen on arrival. ERP present on arrival of patient.        Reason for Admission  EMS     Admission Date  4/30/2023    CODE STATUS  Full Code    HPI & HOSPITAL COURSE  63-year-old male presented 04/30/2023 for abdominal pain and distention at hernia site.  PMHx significant for multiple abdominal surgeries including hernia repair, A-fib on Pradaxa, systolic and diastolic heart failure, T2DM, upper extremity DVT.    He was diagnosed with small bowel obstruction in the setting of multiple previous abdominal surgeries, most recently in February of this year, with resultant adhesions.  CT was consistent with small bowel obstruction and portal venous gas.  Lactic acid was initially elevated but corrected with fluid resuscitation.  General surgery was consulted and SBO was managed medically with nasogastric decompression and n.p.o. status.  He was given fluids, intravenous antibiotics and pain control was achieved with Dilaudid and oxycodone.  This was slowly weaned to avoid opiate contribution to SBO.    He required some oxygen supplementation (up to 4L) throughout his stay which resolved gradually.  He was on room air during evaluation of the morning of discharge.  T2DM was treated with insulin sliding scale.  His acute kidney injury corrected with fluid hydration and avoidance of nephrotoxins.  Pradaxa was reversed in the emergency department and held throughout his stay but restarted on the day of discharge due to low concern for bleeding.  He did not require diuresis for heart failure exacerbation.  His metoprolol was switched to long-acting  form.     He was discharged with Augmentin for total of 7 days of antibiotic therapy.  He also expressed understanding to advance his diet slowly as tolerated.      Therefore, he is discharged in good and stable condition to home with close outpatient follow-up.    The patient met 2-midnight criteria for an inpatient stay at the time of discharge.    Discharge Date  05/03/2023.    Physical Exam on Day of Discharge  Physical Exam  Physical Exam  Vitals and nursing note reviewed.   Constitutional:       General: He is not in acute distress.     Appearance: Normal appearance.   HENT:      Head: Normocephalic and atraumatic.      Right Ear: External ear normal.      Left Ear: External ear normal.   Eyes:      General:         Right eye: No discharge.         Left eye: No discharge.   Cardiovascular:      Rate and Rhythm: Normal rate and regular rhythm.      Pulses: Normal pulses.      Heart sounds: Normal heart sounds.   Pulmonary:      Effort: Pulmonary effort is normal. No respiratory distress.      Breath sounds: Normal breath sounds.   Abdominal:      General: There is no distension.      Palpations: There is no mass.      Tenderness: There is no abdominal tenderness. There is no guarding or rebound.      Hernia: A hernia is present.           Comments:  Large hernia present in the  right lower quadrant.  Appears irreducible, but also without tenderness, guarding, rebound tenderness.   Musculoskeletal:         General: No swelling.      Right lower leg: No edema.      Left lower leg: No edema.   Skin:     Coloration: Skin is not jaundiced.      Findings: No lesion.   Neurological:      General: No focal deficit present.      Mental Status: He is alert and oriented to person, place, and time. Mental status is at baseline.   Psychiatric:         Mood and Affect: Mood normal.         Behavior: Behavior normal.         Thought Content: Thought content normal.         Judgment: Judgment normal.     FOLLOW UP ITEMS POST  DISCHARGE  Blood pressure, pain control, no recurrence of symptoms.    DISCHARGE DIAGNOSES  Principal Problem:    SBO (small bowel obstruction) ([facility]) POA: Yes  Active Problems:    HTN (hypertension) POA: Yes    HEATHER (obstructive sleep apnea) POA: Yes    Atrial fibrillation with RVR ([facility]) POA: Unknown    Systolic congestive heart failure ([facility]) POA: Yes    Elevated lactic acid level POA: Yes    SUHAS (acute kidney injury) ([facility]) POA: Yes    Type 2 diabetes mellitus, without long-term current use of insulin ([facility]) POA: Yes    Acute respiratory failure with hypoxia ([facility]) POA: Unknown  Resolved Problems:    * No resolved hospital problems. *      FOLLOW UP  Future Appointments   Date Time Provider Department Center   5/9/2023  1:00 PM Carter Murphy M.D. [facility]   6/14/2023 10:00 AM Carter Murphy M.D. [facility]     Carter Murphy M.D.  [address] [id number]  [phone]    Go on 5/9/2023  Go to your appointment with Carter Murphy M.D. on Tuesday May 9th 2023 at 1:00pm      MEDICATIONS ON DISCHARGE     Medication List        START taking these medications        Instructions   amoxicillin-clavulanate 875-125 MG Tabs  Commonly known as: AUGMENTIN   Take 1 Tablet by mouth 2 times a day for 4 days.  Dose: 1 Tablet     metoprolol  MG Tb24  Commonly known as: TOPROL XL   Take 1 Tablet by mouth every day.  Dose: 100 mg            CHANGE how you take these medications        Instructions   ROPINIRole 1 MG Tabs  What changed:   how much to take  how to take this  when to take this  Commonly known as: REQUIP   1 tab at hs with an increasing amount of the 0.25 mg tablets as directed            CONTINUE taking these medications        Instructions   albuterol 108 (90 Base) MCG/ACT Aers inhalation aerosol   INHALE TWO PUFFS BY MOUTH EVERY 6 HOURS AS NEEDED FOR SHORTNESS OF BREATH - taking more often lately     amLODIPine 10 MG Tabs  Commonly known as: NORVASC   Take 1 Tablet by mouth  every day.  Dose: 10 mg     Blood Glucose Test Strips   Test strips for Freestyle meter. Use to test blood sugars 2-3 times daily, Diagnosis code E11.9     CareTouch 2 CPAP Hose  Misc   CPAP     dapagliflozin propanediol 5 MG Tabs  Commonly known as: Farxiga   Take 1 Tablet by mouth every day.  Dose: 5 mg     Entresto 24-26 MG Tabs  Generic drug: sacubitril-valsartan   Take 1 Tablet by mouth 2 times a day.  Dose: 1 Tablet     EPINEPHrine 0.3 MG/0.3ML Soaj solution for injection  Commonly known as: EPIPEN   Inject 0.3 mL into the shoulder, thigh, or buttocks as needed.  Dose: 0.3 mg     FLUoxetine 20 MG Caps  Commonly known as: PROZAC   TAKE THREE CAPSULES BY MOUTH EVERY MORNING     furosemide 20 MG Tabs  Commonly known as: LASIX   2 tablets daily in am     gabapentin 300 MG Caps  Commonly known as: NEURONTIN   Take 300 mg by mouth 2 times a day.  Dose: 300 mg     hydrocortisone 2.5 % Crea topical cream   2 times daily as needed     hydrocortisone rectal 2.5% Crea  Commonly known as: Perianal   APPLY TO AFFECTED AREA(S) TWO TIMES A DAY AS DIRECTED     Lancets   Lancets for Freestyle meter. Use to test blood sugars 2-3 times daily, Diagnosis code E11.9     Movantik 25 MG Tabs  Generic drug: Naloxegol Oxalate   TAKE ONE TABLET BY MOUTH OR FEEDING TUBE DAILY     oxyCODONE HCl ER 15 MG T12a   Take 15 mg by mouth 2 times a day.  Dose: 15 mg     polyethylene glycol/lytes 17 g Pack  Commonly known as: MIRALAX   Take 1 Packet by mouth every day.  Dose: 17 g     potassium chloride 10 MEQ capsule  Commonly known as: MICRO-K   Take 1 capsule by mouth 2 times a day.  Dose: 10 mEq     Pradaxa 150 MG Caps capsule  Generic drug: dabigatran   TAKE ONE CAPSULE BY MOUTH TWICE A DAY     pravastatin 40 MG tablet  Commonly known as: PRAVACHOL   TAKE ONE TABLET BY MOUTH EVERY EVENING     terazosin 1 MG Caps  Commonly known as: HYTRIN   TAKE ONE CAPSULE BY MOUTH EVERY MORNING AND TAKE TWO CAPSULES EVERY NIGHT AT BEDTIME     thiamine  100 MG tablet  Commonly known as: THIAMINE   Take 1 tablet by mouth every day.  Dose: 100 mg     tizanidine 2 MG tablet  Commonly known as: ZANAFLEX   Take 4 mg by mouth at bedtime. 4MG=2 TABLETS  Dose: 4 mg            STOP taking these medications      diphenhydrAMINE 25 MG Tabs  Commonly known as: BENADRYL     metoprolol tartrate 100 MG Tabs  Commonly known as: LOPRESSOR     oxyCODONE-acetaminophen  MG Tabs  Commonly known as: PERCOCET-10              Allergies  Allergies   Allergen Reactions    Bee Venom Anaphylaxis    Morphine Itching       DIET  Orders Placed This Encounter   Procedures    Diet Order Diet: Consistent CHO (Diabetic)     Standing Status:   Standing     Number of Occurrences:   1     Order Specific Question:   Diet:     Answer:   Consistent CHO (Diabetic) [4]       ACTIVITY  As tolerated.  Weight bearing as tolerated    CONSULTATIONS  General surgery as above.    PROCEDURES  None.    LABORATORY  Lab Results   Component Value Date    SODIUM 136 05/03/2023    POTASSIUM 4.1 05/03/2023    CHLORIDE 104 05/03/2023    CO2 21 05/03/2023    GLUCOSE 163 (H) 05/03/2023    BUN 17 05/03/2023    CREATININE 0.87 05/03/2023    GLOMRATE 74 07/29/2022        Lab Results   Component Value Date    WBC 10.6 05/03/2023    HEMOGLOBIN 14.5 05/03/2023    HEMATOCRIT 44.1 05/03/2023    PLATELETCT 303 05/03/2023        Total time of the discharge process exceeds 50 minutes.

## 2023-05-03 NOTE — CARE PLAN
The patient is Stable - Low risk of patient condition declining or worsening    Shift Goals  Clinical Goals: Monitor o2, heparin drip, pain control  Patient Goals: Pain management, rest  Family Goals: JAMI    Progress made toward(s) clinical / shift goals:    Problem: Knowledge Deficit - Standard  Goal: Patient and family/care givers will demonstrate understanding of plan of care, disease process/condition, diagnostic tests and medications  Outcome: Progressing     Problem: Skin Integrity  Goal: Skin integrity is maintained or improved  Outcome: Progressing     Problem: Fall Risk  Goal: Patient will remain free from falls  Outcome: Progressing     Problem: Pain - Standard  Goal: Alleviation of pain or a reduction in pain to the patient’s comfort goal  Outcome: Progressing       Patient is not progressing towards the following goals:

## 2023-05-03 NOTE — CARE PLAN
The patient is Stable - Low risk of patient condition declining or worsening    Shift Goals  Clinical Goals: Improved gastro status; Pain control; no N/V  Patient Goals: Pain control  Family Goals: JAMI    Progress made toward(s) clinical / shift goals:  Patient is aware of POC discussed by interdisciplinary team. Patient skin integrity is maintained by self turns.     Patient is not progressing towards the following goals:

## 2023-05-08 ENCOUNTER — HOSPITAL ENCOUNTER (OUTPATIENT)
Dept: RADIOLOGY | Facility: MEDICAL CENTER | Age: 63
End: 2023-05-08
Payer: MEDICARE

## 2023-07-09 PROBLEM — E11.9 TYPE 2 DIABETES MELLITUS, WITHOUT LONG-TERM CURRENT USE OF INSULIN (HCC): Status: RESOLVED | Noted: 2023-02-28 | Resolved: 2023-07-09

## 2023-10-28 ENCOUNTER — HOSPITAL ENCOUNTER (INPATIENT)
Facility: MEDICAL CENTER | Age: 63
LOS: 6 days | DRG: 388 | End: 2023-11-03
Attending: STUDENT IN AN ORGANIZED HEALTH CARE EDUCATION/TRAINING PROGRAM | Admitting: STUDENT IN AN ORGANIZED HEALTH CARE EDUCATION/TRAINING PROGRAM
Payer: MEDICARE

## 2023-10-28 DIAGNOSIS — J96.01 ACUTE RESPIRATORY FAILURE WITH HYPOXIA (HCC): ICD-10-CM

## 2023-10-28 DIAGNOSIS — K56.609 SBO (SMALL BOWEL OBSTRUCTION) (HCC): ICD-10-CM

## 2023-10-28 PROCEDURE — 770000 HCHG ROOM/CARE - INTERMEDIATE ICU *

## 2023-10-29 ENCOUNTER — HOSPITAL ENCOUNTER (OUTPATIENT)
Dept: RADIOLOGY | Facility: MEDICAL CENTER | Age: 63
End: 2023-10-29

## 2023-10-29 ENCOUNTER — APPOINTMENT (OUTPATIENT)
Dept: RADIOLOGY | Facility: MEDICAL CENTER | Age: 63
DRG: 388 | End: 2023-10-29
Attending: INTERNAL MEDICINE
Payer: MEDICARE

## 2023-10-29 PROBLEM — E78.2 MIXED HYPERLIPIDEMIA: Status: ACTIVE | Noted: 2017-01-25

## 2023-10-29 PROBLEM — I50.22 CHRONIC SYSTOLIC CONGESTIVE HEART FAILURE (HCC): Status: ACTIVE | Noted: 2022-06-06

## 2023-10-29 LAB
ANION GAP SERPL CALC-SCNC: 14 MMOL/L (ref 7–16)
BASOPHILS # BLD AUTO: 0.7 % (ref 0–1.8)
BASOPHILS # BLD: 0.09 K/UL (ref 0–0.12)
BUN SERPL-MCNC: 34 MG/DL (ref 8–22)
CALCIUM SERPL-MCNC: 9.4 MG/DL (ref 8.5–10.5)
CHLORIDE SERPL-SCNC: 99 MMOL/L (ref 96–112)
CO2 SERPL-SCNC: 27 MMOL/L (ref 20–33)
CREAT SERPL-MCNC: 1.3 MG/DL (ref 0.5–1.4)
EKG IMPRESSION: NORMAL
EOSINOPHIL # BLD AUTO: 0.01 K/UL (ref 0–0.51)
EOSINOPHIL NFR BLD: 0.1 % (ref 0–6.9)
ERYTHROCYTE [DISTWIDTH] IN BLOOD BY AUTOMATED COUNT: 47.2 FL (ref 35.9–50)
GFR SERPLBLD CREATININE-BSD FMLA CKD-EPI: 61 ML/MIN/1.73 M 2
GLUCOSE BLD STRIP.AUTO-MCNC: 123 MG/DL (ref 65–99)
GLUCOSE BLD STRIP.AUTO-MCNC: 124 MG/DL (ref 65–99)
GLUCOSE BLD STRIP.AUTO-MCNC: 209 MG/DL (ref 65–99)
GLUCOSE SERPL-MCNC: 184 MG/DL (ref 65–99)
HCT VFR BLD AUTO: 47.4 % (ref 42–52)
HGB BLD-MCNC: 15.7 G/DL (ref 14–18)
IMM GRANULOCYTES # BLD AUTO: 0.04 K/UL (ref 0–0.11)
IMM GRANULOCYTES NFR BLD AUTO: 0.3 % (ref 0–0.9)
LACTATE SERPL-SCNC: 2.2 MMOL/L (ref 0.5–2)
LACTATE SERPL-SCNC: 3 MMOL/L (ref 0.5–2)
LYMPHOCYTES # BLD AUTO: 1.06 K/UL (ref 1–4.8)
LYMPHOCYTES NFR BLD: 7.8 % (ref 22–41)
MCH RBC QN AUTO: 31.7 PG (ref 27–33)
MCHC RBC AUTO-ENTMCNC: 33.1 G/DL (ref 32.3–36.5)
MCV RBC AUTO: 95.8 FL (ref 81.4–97.8)
MONOCYTES # BLD AUTO: 1.85 K/UL (ref 0–0.85)
MONOCYTES NFR BLD AUTO: 13.6 % (ref 0–13.4)
NEUTROPHILS # BLD AUTO: 10.52 K/UL (ref 1.82–7.42)
NEUTROPHILS NFR BLD: 77.5 % (ref 44–72)
NRBC # BLD AUTO: 0 K/UL
NRBC BLD-RTO: 0 /100 WBC (ref 0–0.2)
PLATELET # BLD AUTO: 336 K/UL (ref 164–446)
PMV BLD AUTO: 10.6 FL (ref 9–12.9)
POTASSIUM SERPL-SCNC: 4.2 MMOL/L (ref 3.6–5.5)
RBC # BLD AUTO: 4.95 M/UL (ref 4.7–6.1)
SODIUM SERPL-SCNC: 140 MMOL/L (ref 135–145)
WBC # BLD AUTO: 13.6 K/UL (ref 4.8–10.8)

## 2023-10-29 PROCEDURE — 80048 BASIC METABOLIC PNL TOTAL CA: CPT

## 2023-10-29 PROCEDURE — 99222 1ST HOSP IP/OBS MODERATE 55: CPT | Performed by: SURGERY

## 2023-10-29 PROCEDURE — 93010 ELECTROCARDIOGRAM REPORT: CPT | Performed by: INTERNAL MEDICINE

## 2023-10-29 PROCEDURE — 85025 COMPLETE CBC W/AUTO DIFF WBC: CPT

## 2023-10-29 PROCEDURE — 700102 HCHG RX REV CODE 250 W/ 637 OVERRIDE(OP): Performed by: STUDENT IN AN ORGANIZED HEALTH CARE EDUCATION/TRAINING PROGRAM

## 2023-10-29 PROCEDURE — 770000 HCHG ROOM/CARE - INTERMEDIATE ICU *

## 2023-10-29 PROCEDURE — 83605 ASSAY OF LACTIC ACID: CPT

## 2023-10-29 PROCEDURE — 74018 RADEX ABDOMEN 1 VIEW: CPT

## 2023-10-29 PROCEDURE — 82962 GLUCOSE BLOOD TEST: CPT

## 2023-10-29 PROCEDURE — 700105 HCHG RX REV CODE 258: Performed by: STUDENT IN AN ORGANIZED HEALTH CARE EDUCATION/TRAINING PROGRAM

## 2023-10-29 PROCEDURE — 93005 ELECTROCARDIOGRAM TRACING: CPT | Performed by: INTERNAL MEDICINE

## 2023-10-29 PROCEDURE — 700111 HCHG RX REV CODE 636 W/ 250 OVERRIDE (IP): Performed by: STUDENT IN AN ORGANIZED HEALTH CARE EDUCATION/TRAINING PROGRAM

## 2023-10-29 PROCEDURE — 94660 CPAP INITIATION&MGMT: CPT

## 2023-10-29 PROCEDURE — 99223 1ST HOSP IP/OBS HIGH 75: CPT | Mod: AI | Performed by: STUDENT IN AN ORGANIZED HEALTH CARE EDUCATION/TRAINING PROGRAM

## 2023-10-29 RX ORDER — BISACODYL 10 MG
10 SUPPOSITORY, RECTAL RECTAL
Status: DISCONTINUED | OUTPATIENT
Start: 2023-10-29 | End: 2023-10-30

## 2023-10-29 RX ORDER — METOPROLOL TARTRATE 1 MG/ML
5 INJECTION, SOLUTION INTRAVENOUS
Status: DISCONTINUED | OUTPATIENT
Start: 2023-10-29 | End: 2023-10-30

## 2023-10-29 RX ORDER — POLYETHYLENE GLYCOL 3350 17 G/17G
1 POWDER, FOR SOLUTION ORAL
Status: DISCONTINUED | OUTPATIENT
Start: 2023-10-29 | End: 2023-10-30

## 2023-10-29 RX ORDER — HYDROMORPHONE HYDROCHLORIDE 1 MG/ML
0.5 INJECTION, SOLUTION INTRAMUSCULAR; INTRAVENOUS; SUBCUTANEOUS
Status: DISCONTINUED | OUTPATIENT
Start: 2023-10-29 | End: 2023-11-01

## 2023-10-29 RX ORDER — LABETALOL HYDROCHLORIDE 5 MG/ML
10 INJECTION, SOLUTION INTRAVENOUS EVERY 4 HOURS PRN
Status: DISCONTINUED | OUTPATIENT
Start: 2023-10-29 | End: 2023-11-03 | Stop reason: HOSPADM

## 2023-10-29 RX ORDER — PROCHLORPERAZINE EDISYLATE 5 MG/ML
10 INJECTION INTRAMUSCULAR; INTRAVENOUS EVERY 6 HOURS PRN
Status: DISCONTINUED | OUTPATIENT
Start: 2023-10-29 | End: 2023-11-03 | Stop reason: HOSPADM

## 2023-10-29 RX ORDER — AMOXICILLIN 250 MG
2 CAPSULE ORAL 2 TIMES DAILY
Status: DISCONTINUED | OUTPATIENT
Start: 2023-10-29 | End: 2023-10-30

## 2023-10-29 RX ORDER — SODIUM CHLORIDE, SODIUM LACTATE, POTASSIUM CHLORIDE, CALCIUM CHLORIDE 600; 310; 30; 20 MG/100ML; MG/100ML; MG/100ML; MG/100ML
INJECTION, SOLUTION INTRAVENOUS CONTINUOUS
Status: DISCONTINUED | OUTPATIENT
Start: 2023-10-29 | End: 2023-10-31

## 2023-10-29 RX ADMIN — HYDROMORPHONE HYDROCHLORIDE 0.5 MG: 1 INJECTION, SOLUTION INTRAMUSCULAR; INTRAVENOUS; SUBCUTANEOUS at 05:27

## 2023-10-29 RX ADMIN — PROCHLORPERAZINE EDISYLATE 10 MG: 5 INJECTION INTRAMUSCULAR; INTRAVENOUS at 08:30

## 2023-10-29 RX ADMIN — PROCHLORPERAZINE EDISYLATE 10 MG: 5 INJECTION INTRAMUSCULAR; INTRAVENOUS at 02:25

## 2023-10-29 RX ADMIN — HYDROMORPHONE HYDROCHLORIDE 0.5 MG: 1 INJECTION, SOLUTION INTRAMUSCULAR; INTRAVENOUS; SUBCUTANEOUS at 01:28

## 2023-10-29 RX ADMIN — PROCHLORPERAZINE EDISYLATE 10 MG: 5 INJECTION INTRAMUSCULAR; INTRAVENOUS at 15:48

## 2023-10-29 RX ADMIN — SODIUM CHLORIDE, POTASSIUM CHLORIDE, SODIUM LACTATE AND CALCIUM CHLORIDE: 600; 310; 30; 20 INJECTION, SOLUTION INTRAVENOUS at 15:50

## 2023-10-29 RX ADMIN — HYDROMORPHONE HYDROCHLORIDE 0.5 MG: 1 INJECTION, SOLUTION INTRAMUSCULAR; INTRAVENOUS; SUBCUTANEOUS at 08:10

## 2023-10-29 RX ADMIN — SODIUM CHLORIDE, POTASSIUM CHLORIDE, SODIUM LACTATE AND CALCIUM CHLORIDE: 600; 310; 30; 20 INJECTION, SOLUTION INTRAVENOUS at 01:17

## 2023-10-29 RX ADMIN — HYDROMORPHONE HYDROCHLORIDE 0.5 MG: 1 INJECTION, SOLUTION INTRAMUSCULAR; INTRAVENOUS; SUBCUTANEOUS at 20:39

## 2023-10-29 RX ADMIN — INSULIN HUMAN 2 UNITS: 100 INJECTION, SOLUTION PARENTERAL at 05:17

## 2023-10-29 RX ADMIN — HYDROMORPHONE HYDROCHLORIDE 0.5 MG: 1 INJECTION, SOLUTION INTRAMUSCULAR; INTRAVENOUS; SUBCUTANEOUS at 15:47

## 2023-10-29 RX ADMIN — HYDROMORPHONE HYDROCHLORIDE 0.5 MG: 1 INJECTION, SOLUTION INTRAMUSCULAR; INTRAVENOUS; SUBCUTANEOUS at 12:09

## 2023-10-29 ASSESSMENT — COGNITIVE AND FUNCTIONAL STATUS - GENERAL
DRESSING REGULAR LOWER BODY CLOTHING: A LITTLE
TURNING FROM BACK TO SIDE WHILE IN FLAT BAD: A LOT
MOVING TO AND FROM BED TO CHAIR: A LITTLE
CLIMB 3 TO 5 STEPS WITH RAILING: A LITTLE
STANDING UP FROM CHAIR USING ARMS: A LITTLE
DAILY ACTIVITIY SCORE: 21
SUGGESTED CMS G CODE MODIFIER DAILY ACTIVITY: CJ
TOILETING: A LITTLE
WALKING IN HOSPITAL ROOM: A LITTLE
MOBILITY SCORE: 17
SUGGESTED CMS G CODE MODIFIER MOBILITY: CK
HELP NEEDED FOR BATHING: A LITTLE
MOVING FROM LYING ON BACK TO SITTING ON SIDE OF FLAT BED: A LITTLE

## 2023-10-29 ASSESSMENT — PAIN DESCRIPTION - PAIN TYPE
TYPE: ACUTE PAIN
TYPE: ACUTE PAIN
TYPE: ACUTE PAIN;CHRONIC PAIN
TYPE: ACUTE PAIN;CHRONIC PAIN
TYPE: ACUTE PAIN
TYPE: ACUTE PAIN;CHRONIC PAIN
TYPE: ACUTE PAIN
TYPE: CHRONIC PAIN
TYPE: ACUTE PAIN
TYPE: CHRONIC PAIN
TYPE: ACUTE PAIN
TYPE: ACUTE PAIN;CHRONIC PAIN

## 2023-10-29 ASSESSMENT — ENCOUNTER SYMPTOMS
DIARRHEA: 0
SHORTNESS OF BREATH: 0
NAUSEA: 1
FEVER: 0
ABDOMINAL PAIN: 1
COUGH: 0
CHILLS: 0

## 2023-10-29 ASSESSMENT — LIFESTYLE VARIABLES
HAVE PEOPLE ANNOYED YOU BY CRITICIZING YOUR DRINKING: NO
TOTAL SCORE: 2
TOTAL SCORE: 2
DOES PATIENT WANT TO STOP DRINKING: NO
CONSUMPTION TOTAL: POSITIVE
ALCOHOL_USE: YES
EVER HAD A DRINK FIRST THING IN THE MORNING TO STEADY YOUR NERVES TO GET RID OF A HANGOVER: NO
HOW MANY TIMES IN THE PAST YEAR HAVE YOU HAD 5 OR MORE DRINKS IN A DAY: 4
EVER FELT BAD OR GUILTY ABOUT YOUR DRINKING: YES
ON A TYPICAL DAY WHEN YOU DRINK ALCOHOL HOW MANY DRINKS DO YOU HAVE: 3
TOTAL SCORE: 2
HAVE YOU EVER FELT YOU SHOULD CUT DOWN ON YOUR DRINKING: YES
AVERAGE NUMBER OF DAYS PER WEEK YOU HAVE A DRINK CONTAINING ALCOHOL: 5

## 2023-10-29 ASSESSMENT — FIBROSIS 4 INDEX: FIB4 SCORE: 0.88

## 2023-10-29 ASSESSMENT — PATIENT HEALTH QUESTIONNAIRE - PHQ9
2. FEELING DOWN, DEPRESSED, IRRITABLE, OR HOPELESS: NOT AT ALL
SUM OF ALL RESPONSES TO PHQ9 QUESTIONS 1 AND 2: 0
1. LITTLE INTEREST OR PLEASURE IN DOING THINGS: NOT AT ALL

## 2023-10-29 NOTE — ASSESSMENT & PLAN NOTE
Hypertensive urgency with pulmonary edema improving.  Ordered for IV Lasix twice daily, monitor urine output.  Restarted Norvasc, metoprolol, Entresto.  IV labetalol and hydralazine as needed for blood pressure control

## 2023-10-29 NOTE — ASSESSMENT & PLAN NOTE
CT scan at outside facility shows SBO, has a history of SBO with admission for this at this facility earlier this year.  Has had many abdominal surgeries in the past  Surgery team consulted   Symptoms have improved and advancing diet

## 2023-10-29 NOTE — ASSESSMENT & PLAN NOTE
His initial SUHAS had resolved  Ordered for IV Lasix, monitor renal function  Renal function so far stable

## 2023-10-29 NOTE — PROGRESS NOTES
4 Eyes Skin Assessment Completed by Jil MOSS, RN and WILTON Chase.    Head WDL  Ears WDL  Nose NG R nare  Mouth WDL  Neck WDL  Breast/Chest WDL  Shoulder Blades WDL  Spine WDL  (R) Arm/Elbow/Hand Redness and Blanching  (L) Arm/Elbow/Hand Redness  Abdomen Scar, hernia  Groin WDL  Scrotum/Coccyx/Buttocks WDL  (R) Leg WDL  (L) Leg WDL  (R) Heel/Foot/Toe Redness  (L) Heel/Foot/Toe Redness          Devices In Places ECG, Tele Box, Blood Pressure Cuff, Pulse Ox, OG/NG, and Nasal Cannula      Interventions In Place NC W/Ear Foams    Possible Skin Injury No    Pictures Uploaded Into Epic N/A  Wound Consult Placed N/A  RN Wound Prevention Protocol Ordered No

## 2023-10-29 NOTE — CONSULTS
DATE OF CONSULTATION:  10/29/2023     REFERRING PHYSICIAN:   Carmen Cantrell M.D.     CONSULTING PHYSICIAN:  Nancy Wells M.D.     REASON FOR CONSULTATION:  I have been asked by  to see the patient in surgical consultation for evaluation of SBO.    HISTORY OF PRESENT ILLNESS: The patient is a 63 year-old White man who presents to the Emergency Department with a 1-day history of moderate generalized abdominal pain. The pain is associated with abdominal distension. The patient denies any recent or intercurrent illness. The patient has undergone multiple abdominal surgeries including appendectomy, splenecctomy, colectomy, ventral hernia repairs. The patient has undergone previous abdominal surgery for bowel obstruction. This was done in 2/23...     PAST MEDICAL HISTORY:  has a past medical history of Arrhythmia, Bowel obstruction ([facility]), H/O abdominal abscess (02/10/2008), H/O TIA (transient ischemic attack) and stroke (04/01/2012), Hernias of the abdominal cavity, Hyperlipidemia, Hypertension, MI (myocardial infarction) ([facility]) (11/05/2015), Morbid obesity ([facility]), Obesity, Sleep apnea, and Stroke ([facility]).    PAST SURGICAL HISTORY:  has a past surgical history that includes appendectomy (1978); splenectomy (1978); colectomy (12/15/2005); skin abscess incision and drainage (12/23/2005); incision and drainage general (02/10/2008); lysis adhesions general (07/23/2008); umbilical hernia repair (12/30/2004); hernia repair (02/05/2008); incision hernia repair (07/23/2008); other cardiac surgery; ventral hernia repair (11/13/2013); wound dehiscence (11/19/2013); ir recovery room (12/30/2013); pr exploratory of abdomen (N/A, 2/28/2023); and lysis adhesions general (N/A, 2/28/2023).    ALLERGIES:   Allergies   Allergen Reactions    Bee Venom Anaphylaxis    Honey Bee Venom     Morphine Itching       CURRENT MEDICATIONS:    Home Medications    **Home medications have not yet been reviewed for this encounter**          FAMILY HISTORY: family history includes Diabetes in his sister; Heart Disease in his father; Hypertension in his mother; Lung Disease in his father; Stroke in his father.    SOCIAL HISTORY:  reports that he quit smoking about 11 years ago. His smoking use included cigarettes. He started smoking about 31 years ago. He has a 10.0 pack-year smoking history. He has never used smokeless tobacco. He reports current alcohol use of about 1.2 oz of alcohol per week. He reports that he does not currently use drugs after having used the following drugs: Inhaled and Marijuana.    REVIEW OF SYSTEMS: Comprehensive review of systems is negative with the exception of the aforementioned HPI, PMH, and PSH bullets in accordance with CMS guidelines.    PHYSICAL EXAMINATION:    Physical Exam  Cardiovascular:      Rate and Rhythm: Tachycardia present.   Pulmonary:      Effort: Pulmonary effort is normal.   Abdominal:      General: There is distension.      Tenderness: There is abdominal tenderness.      Comments: Large ventral hernia that is not peritoneal  Has some tenderness in the right lower quadrant   Musculoskeletal:         General: Normal range of motion.      Cervical back: Neck supple.   Skin:     General: Skin is warm.   Neurological:      General: No focal deficit present.      Mental Status: He is alert.         LABORATORY VALUES:   Recent Labs     10/28/23  1940 10/29/23  0508   WBC 16.7* 13.6*   RBC 5.04 4.95   HEMOGLOBIN 16.4 15.7   HEMATOCRIT 47.4 47.4   MCV 94.0 95.8   MCH 32.5* 31.7   MCHC 34.6 33.1   RDW 13.2 47.2   PLATELETCT 334 336   MPV 10.0 10.6     Recent Labs     10/28/23  1940 10/29/23  0624   SODIUM 138 140   POTASSIUM 3.9 4.2   CHLORIDE 98 99   CO2 23 27   GLUCOSE 149* 184*   BUN 29* 34*   CREATININE 1.7* 1.30   CALCIUM 9.6 9.4     Recent Labs     10/28/23  1940   ASTSGOT 26   ALTSGPT 33   TBILIRUBIN 0.6   ALKPHOSPHAT 68   INR 1.04     Recent Labs     10/28/23  1940   INR 1.04        IMAGING:    [id number] VIEW   Final Result      Stable findings of small bowel instruction. Consider advancing the nasogastric tube further into the stomach.      OUTSIDE IMAGES-CT ABDOMEN /PELVIS   Final Result      OUTSIDE IMAGES-DX CHEST   Final Result          ASSESSMENT AND PLAN:     * SBO (small bowel obstruction) (HCC)- (present on admission)  Assessment & Plan  Mult abdominal surgeries  Last Ex lap BARBARA 2/23  Now with recurrent SBO  NGT   Non op management        DISPOSITION: Medical evaluation and admission. The patient was admitted to the Medical Service prior to surgical consultation. [facility] Surgery Blue Service will follow.     ____________________________________     Nancy Wells M.D.    DD: 10/29/2023  10:59 AM    AAST Grading System for EGS Conditions  ACS NSQIP Surgical Risk Calculator

## 2023-10-29 NOTE — H&P
Hospital Medicine History & Physical Note    Date of Service  10/29/2023    Primary Care Physician  Carter Murphy M.D.    Code Status  Full Code    Chief Complaint  Abd pain    History of Presenting Illness  Yonny Wallace is a 63 y.o. male who was transferred from outside facility 10/28/2023 with SBO.  PMH of A-fib on anticoagulation, hypertension, dyslipidemia, prediabetes, CHF.  Patient has a history of many abdominal surgeries in the past including splenectomy, appendectomy, colectomy, lysis adhesion, multiple hernia repairs.  He has had multiple SBO's in the past as well, most recently 02/2023 when he was admitted here for this and had an expiratory laparotomy and adhesion lysis.  CT abdomen at outside facility does show bowel obstruction.  Labs showed lactic acidosis up to 4.  Patient is in significant amount of pain and tachycardic but BP stable.  Transferred for possible surgical evaluation.    On my evaluation afebrile, tachycardic, BP stable.  Continues to have pain.  Labs at outside facility showed a WBC of 16.7, creatinine 1.7.  Repeat lactic acid here 2.3.    I discussed the plan of care with patient and bedside RN.    Review of Systems  Review of Systems   Constitutional:  Negative for chills and fever.   Respiratory:  Negative for cough and shortness of breath.    Cardiovascular:  Negative for chest pain.   Gastrointestinal:  Positive for abdominal pain and nausea. Negative for diarrhea.   Genitourinary:  Negative for dysuria and urgency.       Past Medical History   has a past medical history of Arrhythmia, Bowel obstruction ([facility]), H/O abdominal abscess (02/10/2008), H/O TIA (transient ischemic attack) and stroke (04/01/2012), Hernias of the abdominal cavity, Hyperlipidemia, Hypertension, MI (myocardial infarction) ([facility]) (11/05/2015), Morbid obesity ([facility]), Obesity, Sleep apnea, and Stroke ([facility]).    Surgical History   has a past surgical history that includes appendectomy (1978); splenectomy (1978);  colectomy (12/15/2005); skin abscess incision and drainage (12/23/2005); incision and drainage general (02/10/2008); lysis adhesions general (07/23/2008); umbilical hernia repair (12/30/2004); hernia repair (02/05/2008); incision hernia repair (07/23/2008); other cardiac surgery; ventral hernia repair (11/13/2013); wound dehiscence (11/19/2013); ir recovery room (12/30/2013); pr exploratory of abdomen (N/A, 2/28/2023); and lysis adhesions general (N/A, 2/28/2023).     Family History  family history includes Diabetes in his sister; Heart Disease in his father; Hypertension in his mother; Lung Disease in his father; Stroke in his father.   Family history reviewed with patient. There is no family history that is pertinent to the chief complaint.     Social History   reports that he quit smoking about 11 years ago. His smoking use included cigarettes. He started smoking about 31 years ago. He has a 10.0 pack-year smoking history. He has never used smokeless tobacco. He reports current alcohol use of about 1.2 oz of alcohol per week. He reports that he does not currently use drugs after having used the following drugs: Inhaled and Marijuana.    Allergies  Allergies   Allergen Reactions    Bee Venom Anaphylaxis    Honey Bee Venom     Morphine Itching       Medications  Prior to Admission Medications   Prescriptions Last Dose Informant Patient Reported? Taking?   Blood Glucose Monitoring Suppl SUPPLIES Misc  Patient No No   Sig: Test strips for Freestyle meter. Use to test blood sugars 2-3 times daily, Diagnosis code E11.9   EPINEPHrine (EPIPEN) 0.3 MG/0.3ML Solution Auto-injector solution for injection  Patient No No   Sig: Inject 0.3 mL into the shoulder, thigh, or buttocks as needed.   FLUoxetine (PROZAC) 20 MG Cap  Patient No No   Sig: TAKE THREE CAPSULES BY MOUTH EVERY MORNING   Lancets Misc  Patient No No   Sig: Lancets for Freestyle meter. Use to test blood sugars 2-3 times daily, Diagnosis code E11.9   MOVANTIK 25  MG Tab  Patient No No   Sig: TAKE ONE TABLET BY MOUTH OR FEEDING TUBE DAILY   ROPINIRole (REQUIP) 0.25 MG Tab   No No   Sig: TAKE 3 TO 7 TABLETS BY MOUTH AT BEDTIME WITH 1MG TABLET AS DICTATED BY SYMPTOMS   ROPINIRole (REQUIP) 1 MG Tab  Patient No No   Si tab at hs with an increasing amount of the 0.25 mg tablets as directed   Patient taking differently: Take 3 mg by mouth at bedtime. 1 tab at hs with an increasing amount of the 0.25 mg tablets as directed   Respiratory Therapy Supplies (CARETOUCH 2 CPAP HOSE ) Misc  Patient Yes No   Sig: CPAP   albuterol (VENTOLIN HFA) 108 (90 Base) MCG/ACT Aero Soln inhalation aerosol   No No   Sig: INHALE TWO PUFFS BY MOUTH EVERY 6 HOURS AS NEEDED FOR SHORTNESS OF BREATH   amLODIPine (NORVASC) 5 MG Tab   No No   Sig: TAKE 1 TABLET BY MOUTH DAILY   dabigatran (PRADAXA) 150 MG Cap capsule   No No   Sig: TAKE ONE CAPSULE BY MOUTH TWICE A DAY   dapagliflozin propanediol (FARXIGA) 5 MG Tab  Patient No No   Sig: Take 1 Tablet by mouth every day.   furosemide (LASIX) 20 MG Tab  Patient No No   Si tablets daily in am   gabapentin (NEURONTIN) 300 MG Cap  Patient Yes No   Sig: Take 300 mg by mouth 2 times a day.   hydrocortisone 2.5 % Cream topical cream  Patient No No   Si times daily as needed   hydrocortisone rectal (PERIANAL) 2.5% Cream   No No   Sig: APPLY CREAM TO THE AFFECTED AREA(S) TWO TIMES A DAY AS DIRECTED   metoprolol SR (TOPROL XL) 100 MG TABLET SR 24 HR   No No   Sig: Take 1 Tablet by mouth every day.   oxyCODONE HCl ER 15 MG Tablet Extended Release 12 hour Abuse-Deterrent  Patient Yes No   Sig: Take 15 mg by mouth 2 times a day.   polyethylene glycol/lytes (MIRALAX) 17 g Pack  Patient No No   Sig: Take 1 Packet by mouth every day.   potassium chloride (MICRO-K) 10 MEQ capsule  Patient No No   Sig: Take 1 capsule by mouth 2 times a day.   pravastatin (PRAVACHOL) 40 MG tablet  Patient No No   Sig: TAKE ONE TABLET BY MOUTH EVERY EVENING   sacubitril-valsartan  (ENTRESTO) 24-26 MG Tab  Patient Yes No   Sig: Take 1 Tablet by mouth 2 times a day.   terazosin (HYTRIN) 1 MG Cap   No No   Sig: TAKE ONE CAPSULE BY MOUTH EVERY MORNING AND TAKE TWO CAPSULES BY MOUTH AT BEDTIME   thiamine (THIAMINE) 100 MG tablet  Patient No No   Sig: Take 1 tablet by mouth every day.   tizanidine (ZANAFLEX) 2 MG tablet  Patient Yes No   Sig: Take 4 mg by mouth at bedtime. 4MG=2 TABLETS      Facility-Administered Medications: None       Physical Exam  Temp:  [36 °C (96.8 °F)-36.6 °C (97.9 °F)] 36 °C (96.8 °F)  Pulse:  [108-139] 116  Resp:  [16-35] 16  BP: (137-178)/() 138/88  SpO2:  [92 %-97 %] 94 %  Blood Pressure : (!) 161/77   Temperature: 36 °C (96.8 °F)   Pulse: (!) 123   Respiration: (!) 21   Pulse Oximetry: 94 %       Physical Exam  Constitutional:       Appearance: Normal appearance. He is obese.   HENT:      Head: Normocephalic and atraumatic.      Mouth/Throat:      Mouth: Mucous membranes are moist.      Pharynx: Oropharynx is clear. No oropharyngeal exudate or posterior oropharyngeal erythema.   Eyes:      General: No scleral icterus.  Cardiovascular:      Rate and Rhythm: Normal rate and regular rhythm.      Pulses: Normal pulses.      Heart sounds: Normal heart sounds. No murmur heard.  Pulmonary:      Effort: Pulmonary effort is normal. No respiratory distress.      Breath sounds: Normal breath sounds. No wheezing.   Abdominal:      Palpations: Abdomen is soft.      Tenderness: There is abdominal tenderness.      Comments: Large hernia   Musculoskeletal:         General: No swelling or tenderness. Normal range of motion.      Cervical back: Normal range of motion.   Skin:     General: Skin is warm and dry.   Neurological:      General: No focal deficit present.      Mental Status: He is alert and oriented to person, place, and time. Mental status is at baseline.   Psychiatric:         Mood and Affect: Mood normal.         Laboratory:  Recent Labs     10/28/23  1940   WBC 16.7*  "  RBC 5.04   HEMOGLOBIN 16.4   HEMATOCRIT 47.4   MCV 94.0   MCH 32.5*   MCHC 34.6   RDW 13.2   PLATELETCT 334   MPV 10.0     Recent Labs     10/28/23  1940   SODIUM 138   POTASSIUM 3.9   CHLORIDE 98   CO2 23   GLUCOSE 149*   BUN 29*   CREATININE 1.7*   CALCIUM 9.6     Recent Labs     10/28/23  1940   ALTSGPT 33   ASTSGOT 26   ALKPHOSPHAT 68   TBILIRUBIN 0.6   LIPASE 77   GLUCOSE 149*     Recent Labs     10/28/23  1940   INR 1.04     No results for input(s): \"NTPROBNP\" in the last 72 hours.      No results for input(s): \"TROPONINT\" in the last 72 hours.    Imaging:  No orders to display       X-Ray:  I have personally reviewed the images and compared with prior images. and My impression is: Consolidation on the left, likely atelectasis    Assessment/Plan:  Justification for Admission Status  I anticipate this patient will require at least two midnights for appropriate medical management, necessitating inpatient admission because sbo    * SBO (small bowel obstruction) (HCC)- (present on admission)  Assessment & Plan  CT scan at outside facility shows SBO, has a history of SBO with admission for this at this facility earlier this year.  Has had many abdominal surgeries in the past  NG tube started prior to transfer, continue.  Strict n.p.o., IV fluids, narcotic pain management due to severe pain.  Needs close hemodynamic and respiratory status monitoring  Patient does not want surgical intervention unless absolutely necessary.  Continue with medical management for now, if worsens or lactic acid continues to trend up then surgical consult    Atrial fibrillation with RVR (HCC)- (present on admission)  Assessment & Plan  History of A-fib, in RVR secondary to pain.  Has not been unable to take his metoprolol due to abdominal pain  As needed IV metoprolol ordered, if unable to control heart rate we will try diltiazem  Pain control  Holding dabigatran for now as he was transferred for possible need for surgery.  If patient " improving and does not need surgery, restart anticoagulation    Chronic systolic congestive heart failure (HCC)- (present on admission)  Assessment & Plan  Recovered EF, EF from earlier this year was 55%  Euvolemic at this time  Hold metoprolol, Entresto, dabigatran due to strict n.p.o. for SBO.  Restart when able    SUHAS (acute kidney injury) ([facility])- (present on admission)  Assessment & Plan  Creatinine outside facility 1.7, baseline about 0.9  IV fluids, avoid nephrotoxic agents.  BMP ordered to continue monitoring    Mixed hyperlipidemia- (present on admission)  Assessment & Plan  Restart home meds when no longer n.p.o.    HEATHER (obstructive sleep apnea)- (present on admission)  Assessment & Plan  CPAP ordered    Primary hypertension- (present on admission)  Assessment & Plan  Strict n.p.o. due to SBO.  As needed antihypertensives ordered  Restart home meds when able        VTE prophylaxis: therapeutic anticoagulation with dabigatran

## 2023-10-29 NOTE — ASSESSMENT & PLAN NOTE
Mult abdominal surgeries  Last Ex lap BARBARA 2/23  Now with recurrent SBO  NGT   Non op management  10/31 + BM, clamp NG tube and start sips of clears  11/1 Tolerating clears, remove NG and advance diet  11/2 Advance diet

## 2023-10-29 NOTE — CONSULTS
[facility] INTENSIVIST DIRECT ADMISSION REPORT    Transferring facility: [facility]    Chief complaint: SBO    Pertinent history & patient course: 63-year-old male with a history of multiple abdominal surgeries, multiple SBO in the past.  Presented to [facility] with another SBO.  Discussed with surgery in [facility], given complexity it was requested that he be transferred here.  He does not have any ICU needs, he is tachycardic in the 130s with a lactate of 4 but he was only received roughly 1 L of fluid so far and has not had a lactate rechecked.  NG was placed for decompression otherwise vitals and labs are normal.    No evidence of perforation but I did asked to give 1 dose of Zosyn prior to transfer until we can reevaluate him here for antibiotic need      Pertinent imaging & lab results: As above      Code Status: Did not discuss but he has been full code in the past    Further work up or recommendations prior to transfer: None    Consultants called prior to transfer and pertinent input from consultants: Surgery     Patient accepted for transfer: Yes  Consultants to be called upon arrival: Surgery  Floor requested: IMCU  ADT order placed for accepted patient: Yes    Please inform the hospitalist upon assignment of an IMCU bed and then again on arrival of the patient.

## 2023-10-29 NOTE — CARE PLAN
The patient is Watcher - Medium risk of patient condition declining or worsening    Shift Goals  Clinical Goals: pain management, monitor I&Os, nausea management  Patient Goals: rest  Family Goals: JAMI    Progress made toward(s) clinical / shift goals:    Problem: Knowledge Deficit - Standard  Goal: Patient and family/care givers will demonstrate understanding of plan of care, disease process/condition, diagnostic tests and medications  10/29/2023 0255 by Jil Werner R.N.  Outcome: Progressing  10/29/2023 0254 by Jil Werner R.N.  Outcome: Progressing     Problem: Pain - Standard  Goal: Alleviation of pain or a reduction in pain to the patient’s comfort goal  10/29/2023 0255 by Jil Werner R.N.  Outcome: Progressing  10/29/2023 0254 by Jil Werner R.N.  Outcome: Progressing     Problem: Skin Integrity  Goal: Skin integrity is maintained or improved  10/29/2023 0255 by Jil Werner R.N.  Outcome: Progressing  10/29/2023 0254 by Jil Werner R.N.  Outcome: Progressing     Problem: Fall Risk  Goal: Patient will remain free from falls  10/29/2023 0255 by Jil Werner R.N.  Outcome: Progressing  10/29/2023 0254 by Jil Werner R.N.  Outcome: Progressing     Problem: Hemodynamics  Goal: Patient's hemodynamics, fluid balance and neurologic status will be stable or improve  Outcome: Progressing     Problem: Respiratory  Goal: Patient will achieve/maintain optimum respiratory ventilation and gas exchange  Outcome: Progressing     Problem: Risk for Aspiration  Goal: Patient's risk for aspiration will be absent or decrease  Outcome: Progressing       Patient is not progressing towards the following goals:

## 2023-10-29 NOTE — CARE PLAN
The patient is Watcher - Medium risk of patient condition declining or worsening    Shift Goals  Clinical Goals: Comfort, pt will be hemodynamically stable  Patient Goals: Comfort  Family Goals: JAMI    Progress made toward(s) clinical / shift goals:    Problem: Pain - Standard  Goal: Alleviation of pain or a reduction in pain to the patient’s comfort goal  Outcome: Progressing, pt has reduced pain with current treatment plan.      Problem: Fall Risk  Goal: Patient will remain free from falls  Outcome: Progressing, pt remains free from falls.        Patient is not progressing towards the following goals:

## 2023-10-29 NOTE — ASSESSMENT & PLAN NOTE
10/30/2023    Recovered EF, EF from earlier this year was 55%  He is now hypertensive with pulmonary edema.  Ordered for IV Lasix twice daily, monitor urine output.  Restarted Norvasc, metoprolol, Entresto.  IV labetalol and hydralazine as needed for blood pressure control

## 2023-10-29 NOTE — ASSESSMENT & PLAN NOTE
In A-fib, rate controlled   telemetry  Continue home metoprolol and Pradaxa  Has intermittent NSVT.  Recent echo this year was unremarkable.  Monitoring electrolytes and repleting as indicated.  I discussed with the patient and he will follow-up with his cardiologist in [place] to consider Holter monitor.

## 2023-10-30 LAB
ANION GAP SERPL CALC-SCNC: 8 MMOL/L (ref 7–16)
BASOPHILS # BLD AUTO: 0.3 % (ref 0–1.8)
BASOPHILS # BLD: 0.03 K/UL (ref 0–0.12)
BUN SERPL-MCNC: 24 MG/DL (ref 8–22)
CALCIUM SERPL-MCNC: 8.6 MG/DL (ref 8.5–10.5)
CHLORIDE SERPL-SCNC: 105 MMOL/L (ref 96–112)
CO2 SERPL-SCNC: 29 MMOL/L (ref 20–33)
CREAT SERPL-MCNC: 1 MG/DL (ref 0.5–1.4)
EOSINOPHIL # BLD AUTO: 0.15 K/UL (ref 0–0.51)
EOSINOPHIL NFR BLD: 1.7 % (ref 0–6.9)
ERYTHROCYTE [DISTWIDTH] IN BLOOD BY AUTOMATED COUNT: 50.5 FL (ref 35.9–50)
GFR SERPLBLD CREATININE-BSD FMLA CKD-EPI: 83 ML/MIN/1.73 M 2
GLUCOSE BLD STRIP.AUTO-MCNC: 108 MG/DL (ref 65–99)
GLUCOSE BLD STRIP.AUTO-MCNC: 110 MG/DL (ref 65–99)
GLUCOSE BLD STRIP.AUTO-MCNC: 112 MG/DL (ref 65–99)
GLUCOSE BLD STRIP.AUTO-MCNC: 132 MG/DL (ref 65–99)
GLUCOSE BLD STRIP.AUTO-MCNC: 147 MG/DL (ref 65–99)
GLUCOSE SERPL-MCNC: 140 MG/DL (ref 65–99)
HCT VFR BLD AUTO: 42.8 % (ref 42–52)
HGB BLD-MCNC: 14 G/DL (ref 14–18)
IMM GRANULOCYTES # BLD AUTO: 0.02 K/UL (ref 0–0.11)
IMM GRANULOCYTES NFR BLD AUTO: 0.2 % (ref 0–0.9)
LYMPHOCYTES # BLD AUTO: 1.31 K/UL (ref 1–4.8)
LYMPHOCYTES NFR BLD: 14.5 % (ref 22–41)
MCH RBC QN AUTO: 32.6 PG (ref 27–33)
MCHC RBC AUTO-ENTMCNC: 32.7 G/DL (ref 32.3–36.5)
MCV RBC AUTO: 99.5 FL (ref 81.4–97.8)
MONOCYTES # BLD AUTO: 1.35 K/UL (ref 0–0.85)
MONOCYTES NFR BLD AUTO: 14.9 % (ref 0–13.4)
NEUTROPHILS # BLD AUTO: 6.18 K/UL (ref 1.82–7.42)
NEUTROPHILS NFR BLD: 68.4 % (ref 44–72)
NRBC # BLD AUTO: 0 K/UL
NRBC BLD-RTO: 0 /100 WBC (ref 0–0.2)
PLATELET # BLD AUTO: 290 K/UL (ref 164–446)
PMV BLD AUTO: 10.5 FL (ref 9–12.9)
POTASSIUM SERPL-SCNC: 3.9 MMOL/L (ref 3.6–5.5)
RBC # BLD AUTO: 4.3 M/UL (ref 4.7–6.1)
SODIUM SERPL-SCNC: 142 MMOL/L (ref 135–145)
WBC # BLD AUTO: 9 K/UL (ref 4.8–10.8)

## 2023-10-30 PROCEDURE — 85025 COMPLETE CBC W/AUTO DIFF WBC: CPT

## 2023-10-30 PROCEDURE — 700105 HCHG RX REV CODE 258: Performed by: STUDENT IN AN ORGANIZED HEALTH CARE EDUCATION/TRAINING PROGRAM

## 2023-10-30 PROCEDURE — 770001 HCHG ROOM/CARE - MED/SURG/GYN PRIV*

## 2023-10-30 PROCEDURE — 82962 GLUCOSE BLOOD TEST: CPT | Mod: 91

## 2023-10-30 PROCEDURE — 700111 HCHG RX REV CODE 636 W/ 250 OVERRIDE (IP): Performed by: STUDENT IN AN ORGANIZED HEALTH CARE EDUCATION/TRAINING PROGRAM

## 2023-10-30 PROCEDURE — 94660 CPAP INITIATION&MGMT: CPT

## 2023-10-30 PROCEDURE — 99233 SBSQ HOSP IP/OBS HIGH 50: CPT | Performed by: INTERNAL MEDICINE

## 2023-10-30 PROCEDURE — 80048 BASIC METABOLIC PNL TOTAL CA: CPT

## 2023-10-30 PROCEDURE — 700111 HCHG RX REV CODE 636 W/ 250 OVERRIDE (IP): Performed by: INTERNAL MEDICINE

## 2023-10-30 PROCEDURE — 99231 SBSQ HOSP IP/OBS SF/LOW 25: CPT | Performed by: SURGERY

## 2023-10-30 RX ORDER — ENOXAPARIN SODIUM 150 MG/ML
1 INJECTION SUBCUTANEOUS 2 TIMES DAILY
Status: DISCONTINUED | OUTPATIENT
Start: 2023-10-30 | End: 2023-11-02

## 2023-10-30 RX ADMIN — HYDROMORPHONE HYDROCHLORIDE 0.5 MG: 1 INJECTION, SOLUTION INTRAMUSCULAR; INTRAVENOUS; SUBCUTANEOUS at 21:39

## 2023-10-30 RX ADMIN — HYDROMORPHONE HYDROCHLORIDE 0.5 MG: 1 INJECTION, SOLUTION INTRAMUSCULAR; INTRAVENOUS; SUBCUTANEOUS at 02:10

## 2023-10-30 RX ADMIN — ENOXAPARIN SODIUM 150 MG: 150 INJECTION SUBCUTANEOUS at 11:54

## 2023-10-30 RX ADMIN — HYDROMORPHONE HYDROCHLORIDE 0.5 MG: 1 INJECTION, SOLUTION INTRAMUSCULAR; INTRAVENOUS; SUBCUTANEOUS at 23:43

## 2023-10-30 RX ADMIN — HYDROMORPHONE HYDROCHLORIDE 0.5 MG: 1 INJECTION, SOLUTION INTRAMUSCULAR; INTRAVENOUS; SUBCUTANEOUS at 10:06

## 2023-10-30 RX ADMIN — SODIUM CHLORIDE, POTASSIUM CHLORIDE, SODIUM LACTATE AND CALCIUM CHLORIDE: 600; 310; 30; 20 INJECTION, SOLUTION INTRAVENOUS at 21:41

## 2023-10-30 RX ADMIN — LABETALOL HYDROCHLORIDE 10 MG: 5 INJECTION INTRAVENOUS at 20:42

## 2023-10-30 RX ADMIN — ENOXAPARIN SODIUM 150 MG: 150 INJECTION SUBCUTANEOUS at 20:12

## 2023-10-30 RX ADMIN — HYDROMORPHONE HYDROCHLORIDE 0.5 MG: 1 INJECTION, SOLUTION INTRAMUSCULAR; INTRAVENOUS; SUBCUTANEOUS at 07:39

## 2023-10-30 RX ADMIN — HYDROMORPHONE HYDROCHLORIDE 0.5 MG: 1 INJECTION, SOLUTION INTRAMUSCULAR; INTRAVENOUS; SUBCUTANEOUS at 17:33

## 2023-10-30 RX ADMIN — HYDROMORPHONE HYDROCHLORIDE 0.5 MG: 1 INJECTION, SOLUTION INTRAMUSCULAR; INTRAVENOUS; SUBCUTANEOUS at 19:34

## 2023-10-30 RX ADMIN — SODIUM CHLORIDE, POTASSIUM CHLORIDE, SODIUM LACTATE AND CALCIUM CHLORIDE: 600; 310; 30; 20 INJECTION, SOLUTION INTRAVENOUS at 04:43

## 2023-10-30 RX ADMIN — HYDROMORPHONE HYDROCHLORIDE 0.5 MG: 1 INJECTION, SOLUTION INTRAMUSCULAR; INTRAVENOUS; SUBCUTANEOUS at 04:46

## 2023-10-30 RX ADMIN — HYDROMORPHONE HYDROCHLORIDE 0.5 MG: 1 INJECTION, SOLUTION INTRAMUSCULAR; INTRAVENOUS; SUBCUTANEOUS at 14:39

## 2023-10-30 RX ADMIN — SODIUM CHLORIDE, POTASSIUM CHLORIDE, SODIUM LACTATE AND CALCIUM CHLORIDE: 600; 310; 30; 20 INJECTION, SOLUTION INTRAVENOUS at 16:22

## 2023-10-30 RX ADMIN — HYDROMORPHONE HYDROCHLORIDE 0.5 MG: 1 INJECTION, SOLUTION INTRAMUSCULAR; INTRAVENOUS; SUBCUTANEOUS at 12:18

## 2023-10-30 RX ADMIN — PROCHLORPERAZINE EDISYLATE 10 MG: 5 INJECTION INTRAMUSCULAR; INTRAVENOUS at 09:21

## 2023-10-30 ASSESSMENT — ENCOUNTER SYMPTOMS
TREMORS: 0
ABDOMINAL PAIN: 1
BACK PAIN: 0
NECK PAIN: 0
SPEECH CHANGE: 0
ORTHOPNEA: 0
HEADACHES: 0
COUGH: 0
NAUSEA: 0
VOMITING: 0
PHOTOPHOBIA: 0
DOUBLE VISION: 0
HALLUCINATIONS: 0
WEIGHT LOSS: 0
MYALGIAS: 0
PALPITATIONS: 0
CHILLS: 0
BLURRED VISION: 0
SPUTUM PRODUCTION: 0
SENSORY CHANGE: 0
DIARRHEA: 0
SHORTNESS OF BREATH: 0
TINGLING: 0
FEVER: 0
CONSTIPATION: 0
EYE PAIN: 0
DIZZINESS: 0
FOCAL WEAKNESS: 0

## 2023-10-30 ASSESSMENT — PAIN DESCRIPTION - PAIN TYPE
TYPE: ACUTE PAIN

## 2023-10-30 ASSESSMENT — FIBROSIS 4 INDEX: FIB4 SCORE: 1.01

## 2023-10-30 ASSESSMENT — LIFESTYLE VARIABLES: SUBSTANCE_ABUSE: 0

## 2023-10-30 NOTE — CARE PLAN
The patient is Watcher - Medium risk of patient condition declining or worsening    Shift Goals  Clinical Goals: pain management, hemodynamic stability, nausea management  Patient Goals: comfort  Family Goals: JAMI    Progress made toward(s) clinical / shift goals:    Problem: Knowledge Deficit - Standard  Goal: Patient and family/care givers will demonstrate understanding of plan of care, disease process/condition, diagnostic tests and medications  Outcome: Progressing     Problem: Pain - Standard  Goal: Alleviation of pain or a reduction in pain to the patient’s comfort goal  Outcome: Progressing     Problem: Skin Integrity  Goal: Skin integrity is maintained or improved  Outcome: Progressing     Problem: Fall Risk  Goal: Patient will remain free from falls  Outcome: Progressing     Problem: Hemodynamics  Goal: Patient's hemodynamics, fluid balance and neurologic status will be stable or improve  Outcome: Progressing     Problem: Respiratory  Goal: Patient will achieve/maintain optimum respiratory ventilation and gas exchange  Outcome: Progressing     Problem: Risk for Aspiration  Goal: Patient's risk for aspiration will be absent or decrease  Outcome: Progressing       Patient is not progressing towards the following goals:

## 2023-10-30 NOTE — PROGRESS NOTES
Hospital Medicine Daily Progress Note    Date of Service  10/29/2023    Chief Complaint  Yonny Wallace is a 63 y.o. male admitted 10/28/2023 with abdominal pain    Hospital Course  Yonny Wallace is a 63 y.o. male who was transferred from outside facility 10/28/2023 with SBO.  PMH of A-fib on anticoagulation, hypertension, dyslipidemia, prediabetes, CHF.  Patient has a history of many abdominal surgeries in the past including splenectomy, appendectomy, colectomy, lysis adhesion, multiple hernia repairs.  He has had multiple SBO's in the past as well, most recently 02/2023 when he was admitted here for this and had an expiratory laparotomy and adhesion lysis.  CT abdomen at outside facility does show bowel obstruction.  Labs showed lactic acidosis up to 4.  Patient is in significant amount of pain and tachycardic but BP stable.  Transferred for possible surgical evaluation.     On my evaluation afebrile, tachycardic, BP stable.  Continues to have pain.  Labs at outside facility showed a WBC of 16.7, creatinine 1.7.  Repeat lactic acid here 2.3.    I requested consultation with general surgery Dr. Wells.  Surgery team recommended medical management.    Interval Problem Update  I evaluated and examined him at the bedside.  He reported the last time he passed gas was yesterday.  Currently he is on NG tube with intermittent wall suctioning.  I requested consultation with surgery Dr. Wells.    I have discussed this patient's plan of care and discharge plan at IDT rounds today with Case Management, Nursing, Nursing leadership, and other members of the IDT team.    Consultants/Specialty  general surgery    Code Status  Full Code    Disposition  The patient is not medically cleared for discharge to home or a post-acute facility.  Anticipate discharge to: home with close outpatient follow-up    I have placed the appropriate orders for post-discharge needs.    Review of Systems  ROS     Physical Exam  Temp:  [36 °C (96.8  °F)-36.6 °C (97.9 °F)] 36.6 °C (97.8 °F)  Pulse:  [] 111  Resp:  [13-35] 14  BP: (127-178)/() 146/78  SpO2:  [91 %-98 %] 94 %    Physical Exam    Fluids    Intake/Output Summary (Last 24 hours) at 10/29/2023 1730  Last data filed at 10/29/2023 0710  Gross per 24 hour   Intake 381.67 ml   Output 2200 ml   Net -1818.33 ml       Laboratory  Recent Labs     10/28/23  1940 10/29/23  0508   WBC 16.7* 13.6*   RBC 5.04 4.95   HEMOGLOBIN 16.4 15.7   HEMATOCRIT 47.4 47.4   MCV 94.0 95.8   MCH 32.5* 31.7   MCHC 34.6 33.1   RDW 13.2 47.2   PLATELETCT 334 336   MPV 10.0 10.6     Recent Labs     10/28/23  1940 10/29/23  0624   SODIUM 138 140   POTASSIUM 3.9 4.2   CHLORIDE 98 99   CO2 23 27   GLUCOSE 149* 184*   BUN 29* 34*   CREATININE 1.7* 1.30   CALCIUM 9.6 9.4     Recent Labs     10/28/23  1940   INR 1.04               Imaging  DX-ABDOMEN FOR TUBE PLACEMENT   Final Result         Gastric drainage tube with tip projecting over the expected area of the stomach. Sidehole at GE junction.      [id number] VIEW   Final Result      Stable findings of small bowel instruction. Consider advancing the nasogastric tube further into the stomach.      OUTSIDE IMAGES-CT ABDOMEN /PELVIS   Final Result      OUTSIDE IMAGES-DX CHEST   Final Result           Assessment/Plan  * SBO (small bowel obstruction) (HCC)- (present on admission)  Assessment & Plan  CT scan at outside facility shows SBO, has a history of SBO with admission for this at this facility earlier this year.  Has had many abdominal surgeries in the past  NG tube started prior to transfer, continue.  Strict n.p.o., IV fluids, narcotic pain management due to severe pain.  Needs close hemodynamic and respiratory status monitoring  Patient does not want surgical intervention unless absolutely necessary.  Continue with medical management for now, if worsens or lactic acid continues to trend up then surgical consult    SUHAS (acute kidney injury) (HCC)- (present on  admission)  Assessment & Plan  Creatinine outside facility 1.7, baseline about 0.9  IV fluids, avoid nephrotoxic agents.  BMP ordered to continue monitoring    Chronic systolic congestive heart failure (HCC)- (present on admission)  Assessment & Plan  Recovered EF, EF from earlier this year was 55%  Euvolemic at this time  Hold metoprolol, Entresto, dabigatran due to strict n.p.o. for SBO.  Restart when able    Mixed hyperlipidemia- (present on admission)  Assessment & Plan  Restart home meds when no longer n.p.o.    Atrial fibrillation with RVR (HCC)- (present on admission)  Assessment & Plan  History of A-fib, in RVR secondary to pain.  Has not been unable to take his metoprolol due to abdominal pain  As needed IV metoprolol ordered, if unable to control heart rate we will try diltiazem  Pain control  Holding dabigatran for now as he was transferred for possible need for surgery.  If patient improving and does not need surgery, restart anticoagulation    HEATHER (obstructive sleep apnea)- (present on admission)  Assessment & Plan  CPAP ordered    Primary hypertension- (present on admission)  Assessment & Plan  Strict n.p.o. due to SBO.  As needed antihypertensives ordered  Restart home meds when able       Holding pharmacological DVT prophylaxis as patient may need surgical intervention at some point of time.  If he will improve plan is to initiate pharmacological DVT prophylaxis.        VTE prophylaxis:   SCDs/TEDs      I have performed a physical exam and reviewed and updated ROS and Plan today (10/29/2023). In review of yesterday's note (10/28/2023), there are no changes except as documented above.

## 2023-10-30 NOTE — PROGRESS NOTES
"  DATE: 10/30/2023    Hospital Day 2  small bowel obstruction .    INTERVAL EVENTS:  Mild nausea this morning.  Passing flatus. No NG output recorded, so unclear how well it has been working. Needs to ambulate. Leukocytosis resolved.    REVIEW OF SYSTEMS:  Comprehensive review of systems is negative with the exception of the aforementioned HPI, PMH, and PSH bullets in accordance with CMS guidelines.    PHYSICAL EXAMINATION:  Vital Signs: /65   Pulse (!) 126   Temp 36.6 °C (97.8 °F) (Temporal)   Resp 20   Ht 1.702 m (5' 7\")   Wt (!) 138 kg (303 lb 9.2 oz)   SpO2 95%   Physical Exam  Constitutional:       Appearance: He is obese.   HENT:      Head: Normocephalic and atraumatic.      Right Ear: External ear normal.      Left Ear: External ear normal.      Nose:      Comments: Nasogastric tube      Mouth/Throat:      Pharynx: Oropharynx is clear.   Eyes:      Pupils: Pupils are equal, round, and reactive to light.   Cardiovascular:      Rate and Rhythm: Regular rhythm. Tachycardia present.      Pulses: Normal pulses.   Pulmonary:      Effort: Pulmonary effort is normal.   Abdominal:      General: There is distension.      Comments: Very mild tenderness   Skin:     General: Skin is warm.   Neurological:      Mental Status: He is alert and oriented to person, place, and time.   Psychiatric:         Mood and Affect: Mood normal.         LABORATORY VALUES:  Recent Labs     10/28/23  1940 10/29/23  0508 10/30/23  0216   WBC 16.7* 13.6* 9.0   RBC 5.04 4.95 4.30*   HEMOGLOBIN 16.4 15.7 14.0   HEMATOCRIT 47.4 47.4 42.8   MCV 94.0 95.8 99.5*   MCH 32.5* 31.7 32.6   MCHC 34.6 33.1 32.7   RDW 13.2 47.2 50.5*   PLATELETCT 334 336 290   MPV 10.0 10.6 10.5     Recent Labs     10/28/23  1940 10/29/23  0624 10/30/23  0216   SODIUM 138 140 142   POTASSIUM 3.9 4.2 3.9   CHLORIDE 98 99 105   CO2 23 27 29   GLUCOSE 149* 184* 140*   BUN 29* 34* 24*   CREATININE 1.7* 1.30 1.00   CALCIUM 9.6 9.4 8.6     Recent Labs     " 10/28/23  1940   ASTSGOT 26   ALTSGPT 33   TBILIRUBIN 0.6   ALKPHOSPHAT 68   INR 1.04     Recent Labs     10/28/23  1940   INR 1.04       IMAGING:  DX-ABDOMEN FOR TUBE PLACEMENT   Final Result         Gastric drainage tube with tip projecting over the expected area of the stomach. Sidehole at GE junction.      [id number] VIEW   Final Result      Stable findings of small bowel instruction. Consider advancing the nasogastric tube further into the stomach.      OUTSIDE IMAGES-CT ABDOMEN /PELVIS   Final Result      OUTSIDE IMAGES-DX CHEST   Final Result          ASSESSMENT AND PLAN:    * SBO (small bowel obstruction) (HCC)- (present on admission)  Assessment & Plan  Mult abdominal surgeries  Last Ex lap BARBARA 2/23  Now with recurrent SBO  NGT   Non op management             ____________________________________     Cydney Donnelly M.D.    DD: 10/30/2023  9:10 AM

## 2023-10-30 NOTE — PROGRESS NOTES
Monitor Summary    Rhythm: A. Fib  Rate: 90s-100s, 140s during mobilization  Ectopy: occasional PVCs

## 2023-10-30 NOTE — DIETARY
NUTRITION SERVICES: BMI - Pt with BMI >40 (=Body mass index is 47.55 kg/m².), Class III obesity. Weight loss counseling not appropriate in acute care setting. RECOMMEND - If appropriate at DC please refer to outpatient nutrition services for weight management.     Pt currently NPO x2 d/t SBO. Pt with NGT. RD to follow for diet advancements and nutrition support as indicated.     RD following

## 2023-10-31 ENCOUNTER — APPOINTMENT (OUTPATIENT)
Dept: RADIOLOGY | Facility: MEDICAL CENTER | Age: 63
DRG: 388 | End: 2023-10-31
Attending: INTERNAL MEDICINE
Payer: MEDICARE

## 2023-10-31 LAB
ALBUMIN SERPL BCP-MCNC: 3.5 G/DL (ref 3.2–4.9)
ALBUMIN/GLOB SERPL: 1 G/DL
ALP SERPL-CCNC: 49 U/L (ref 30–99)
ALT SERPL-CCNC: 12 U/L (ref 2–50)
ANION GAP SERPL CALC-SCNC: 13 MMOL/L (ref 7–16)
AST SERPL-CCNC: 12 U/L (ref 12–45)
BILIRUB SERPL-MCNC: 1.1 MG/DL (ref 0.1–1.5)
BUN SERPL-MCNC: 18 MG/DL (ref 8–22)
CALCIUM ALBUM COR SERPL-MCNC: 9.3 MG/DL (ref 8.5–10.5)
CALCIUM SERPL-MCNC: 8.9 MG/DL (ref 8.5–10.5)
CHLORIDE SERPL-SCNC: 107 MMOL/L (ref 96–112)
CO2 SERPL-SCNC: 23 MMOL/L (ref 20–33)
CREAT SERPL-MCNC: 0.84 MG/DL (ref 0.5–1.4)
ERYTHROCYTE [DISTWIDTH] IN BLOOD BY AUTOMATED COUNT: 50.4 FL (ref 35.9–50)
GFR SERPLBLD CREATININE-BSD FMLA CKD-EPI: 96 ML/MIN/1.73 M 2
GLOBULIN SER CALC-MCNC: 3.4 G/DL (ref 1.9–3.5)
GLUCOSE BLD STRIP.AUTO-MCNC: 107 MG/DL (ref 65–99)
GLUCOSE BLD STRIP.AUTO-MCNC: 142 MG/DL (ref 65–99)
GLUCOSE BLD STRIP.AUTO-MCNC: 170 MG/DL (ref 65–99)
GLUCOSE SERPL-MCNC: 142 MG/DL (ref 65–99)
HCT VFR BLD AUTO: 45.3 % (ref 42–52)
HGB BLD-MCNC: 14.3 G/DL (ref 14–18)
MAGNESIUM SERPL-MCNC: 2.1 MG/DL (ref 1.5–2.5)
MCH RBC QN AUTO: 31.6 PG (ref 27–33)
MCHC RBC AUTO-ENTMCNC: 31.6 G/DL (ref 32.3–36.5)
MCV RBC AUTO: 100.2 FL (ref 81.4–97.8)
NT-PROBNP SERPL IA-MCNC: 3278 PG/ML (ref 0–125)
PHOSPHATE SERPL-MCNC: 2.5 MG/DL (ref 2.5–4.5)
PLATELET # BLD AUTO: 287 K/UL (ref 164–446)
PMV BLD AUTO: 10.5 FL (ref 9–12.9)
POTASSIUM SERPL-SCNC: 4.1 MMOL/L (ref 3.6–5.5)
PROCALCITONIN SERPL-MCNC: 0.12 NG/ML
PROT SERPL-MCNC: 6.9 G/DL (ref 6–8.2)
RBC # BLD AUTO: 4.52 M/UL (ref 4.7–6.1)
SODIUM SERPL-SCNC: 143 MMOL/L (ref 135–145)
WBC # BLD AUTO: 16.7 K/UL (ref 4.8–10.8)

## 2023-10-31 PROCEDURE — 700102 HCHG RX REV CODE 250 W/ 637 OVERRIDE(OP): Performed by: INTERNAL MEDICINE

## 2023-10-31 PROCEDURE — 85027 COMPLETE CBC AUTOMATED: CPT

## 2023-10-31 PROCEDURE — 83735 ASSAY OF MAGNESIUM: CPT

## 2023-10-31 PROCEDURE — 71045 X-RAY EXAM CHEST 1 VIEW: CPT

## 2023-10-31 PROCEDURE — 36415 COLL VENOUS BLD VENIPUNCTURE: CPT

## 2023-10-31 PROCEDURE — 700111 HCHG RX REV CODE 636 W/ 250 OVERRIDE (IP): Performed by: INTERNAL MEDICINE

## 2023-10-31 PROCEDURE — 82962 GLUCOSE BLOOD TEST: CPT | Mod: 91

## 2023-10-31 PROCEDURE — 84100 ASSAY OF PHOSPHORUS: CPT

## 2023-10-31 PROCEDURE — 83880 ASSAY OF NATRIURETIC PEPTIDE: CPT

## 2023-10-31 PROCEDURE — 99233 SBSQ HOSP IP/OBS HIGH 50: CPT | Performed by: INTERNAL MEDICINE

## 2023-10-31 PROCEDURE — A9270 NON-COVERED ITEM OR SERVICE: HCPCS | Performed by: INTERNAL MEDICINE

## 2023-10-31 PROCEDURE — 93005 ELECTROCARDIOGRAM TRACING: CPT | Performed by: INTERNAL MEDICINE

## 2023-10-31 PROCEDURE — 700111 HCHG RX REV CODE 636 W/ 250 OVERRIDE (IP): Mod: JZ | Performed by: INTERNAL MEDICINE

## 2023-10-31 PROCEDURE — 700111 HCHG RX REV CODE 636 W/ 250 OVERRIDE (IP): Performed by: STUDENT IN AN ORGANIZED HEALTH CARE EDUCATION/TRAINING PROGRAM

## 2023-10-31 PROCEDURE — 770020 HCHG ROOM/CARE - TELE (206)

## 2023-10-31 PROCEDURE — 700101 HCHG RX REV CODE 250

## 2023-10-31 PROCEDURE — 94640 AIRWAY INHALATION TREATMENT: CPT

## 2023-10-31 PROCEDURE — 84145 PROCALCITONIN (PCT): CPT

## 2023-10-31 PROCEDURE — 80053 COMPREHEN METABOLIC PANEL: CPT

## 2023-10-31 PROCEDURE — 94660 CPAP INITIATION&MGMT: CPT

## 2023-10-31 PROCEDURE — 700102 HCHG RX REV CODE 250 W/ 637 OVERRIDE(OP): Performed by: STUDENT IN AN ORGANIZED HEALTH CARE EDUCATION/TRAINING PROGRAM

## 2023-10-31 PROCEDURE — 99232 SBSQ HOSP IP/OBS MODERATE 35: CPT | Performed by: PHYSICIAN ASSISTANT

## 2023-10-31 PROCEDURE — 700101 HCHG RX REV CODE 250: Performed by: INTERNAL MEDICINE

## 2023-10-31 RX ORDER — FUROSEMIDE 20 MG/1
40 TABLET ORAL
Status: DISCONTINUED | OUTPATIENT
Start: 2023-10-31 | End: 2023-10-31

## 2023-10-31 RX ORDER — ALBUTEROL SULFATE 90 UG/1
2 AEROSOL, METERED RESPIRATORY (INHALATION) EVERY 4 HOURS PRN
Status: DISCONTINUED | OUTPATIENT
Start: 2023-10-31 | End: 2023-10-31

## 2023-10-31 RX ORDER — AMLODIPINE BESYLATE 10 MG/1
5 TABLET ORAL DAILY
Status: DISCONTINUED | OUTPATIENT
Start: 2023-10-31 | End: 2023-11-01

## 2023-10-31 RX ORDER — PRAVASTATIN SODIUM 20 MG
40 TABLET ORAL EVERY EVENING
Status: DISCONTINUED | OUTPATIENT
Start: 2023-10-31 | End: 2023-11-01

## 2023-10-31 RX ORDER — METOPROLOL TARTRATE 50 MG/1
50 TABLET, FILM COATED ORAL 2 TIMES DAILY
Status: DISCONTINUED | OUTPATIENT
Start: 2023-10-31 | End: 2023-11-01

## 2023-10-31 RX ORDER — TERAZOSIN 2 MG/1
2 CAPSULE ORAL EVERY EVENING
Status: DISCONTINUED | OUTPATIENT
Start: 2023-10-31 | End: 2023-11-01

## 2023-10-31 RX ORDER — HYDRALAZINE HYDROCHLORIDE 20 MG/ML
10 INJECTION INTRAMUSCULAR; INTRAVENOUS EVERY 6 HOURS PRN
Status: DISCONTINUED | OUTPATIENT
Start: 2023-10-31 | End: 2023-11-03 | Stop reason: HOSPADM

## 2023-10-31 RX ORDER — FUROSEMIDE 10 MG/ML
40 INJECTION INTRAMUSCULAR; INTRAVENOUS
Status: DISCONTINUED | OUTPATIENT
Start: 2023-10-31 | End: 2023-11-03 | Stop reason: HOSPADM

## 2023-10-31 RX ORDER — FLUOXETINE HYDROCHLORIDE 20 MG/1
60 CAPSULE ORAL DAILY
Status: DISCONTINUED | OUTPATIENT
Start: 2023-10-31 | End: 2023-11-01

## 2023-10-31 RX ORDER — METOPROLOL SUCCINATE 100 MG/1
100 TABLET, EXTENDED RELEASE ORAL DAILY
Status: DISCONTINUED | OUTPATIENT
Start: 2023-10-31 | End: 2023-10-31

## 2023-10-31 RX ADMIN — FLUOXETINE 60 MG: 20 CAPSULE ORAL at 10:09

## 2023-10-31 RX ADMIN — HYDROMORPHONE HYDROCHLORIDE 0.5 MG: 1 INJECTION, SOLUTION INTRAMUSCULAR; INTRAVENOUS; SUBCUTANEOUS at 20:58

## 2023-10-31 RX ADMIN — HYDROMORPHONE HYDROCHLORIDE 0.5 MG: 1 INJECTION, SOLUTION INTRAMUSCULAR; INTRAVENOUS; SUBCUTANEOUS at 18:16

## 2023-10-31 RX ADMIN — INSULIN HUMAN 1 UNITS: 100 INJECTION, SOLUTION PARENTERAL at 16:49

## 2023-10-31 RX ADMIN — ALBUTEROL SULFATE 2.5 MG: 2.5 SOLUTION RESPIRATORY (INHALATION) at 12:18

## 2023-10-31 RX ADMIN — ALBUTEROL SULFATE 2.5 MG: 2.5 SOLUTION RESPIRATORY (INHALATION) at 20:26

## 2023-10-31 RX ADMIN — ALBUTEROL SULFATE 2.5 MG: 2.5 SOLUTION RESPIRATORY (INHALATION) at 23:30

## 2023-10-31 RX ADMIN — HYDROMORPHONE HYDROCHLORIDE 0.5 MG: 1 INJECTION, SOLUTION INTRAMUSCULAR; INTRAVENOUS; SUBCUTANEOUS at 07:56

## 2023-10-31 RX ADMIN — ENOXAPARIN SODIUM 150 MG: 150 INJECTION SUBCUTANEOUS at 05:15

## 2023-10-31 RX ADMIN — HYDROMORPHONE HYDROCHLORIDE 0.5 MG: 1 INJECTION, SOLUTION INTRAMUSCULAR; INTRAVENOUS; SUBCUTANEOUS at 12:16

## 2023-10-31 RX ADMIN — SACUBITRIL AND VALSARTAN 1 TABLET: 24; 26 TABLET, FILM COATED ORAL at 16:55

## 2023-10-31 RX ADMIN — SACUBITRIL AND VALSARTAN 1 TABLET: 24; 26 TABLET, FILM COATED ORAL at 12:05

## 2023-10-31 RX ADMIN — HYDROMORPHONE HYDROCHLORIDE 0.5 MG: 1 INJECTION, SOLUTION INTRAMUSCULAR; INTRAVENOUS; SUBCUTANEOUS at 15:37

## 2023-10-31 RX ADMIN — AMLODIPINE BESYLATE 5 MG: 10 TABLET ORAL at 10:09

## 2023-10-31 RX ADMIN — ALBUTEROL SULFATE 2.5 MG: 2.5 SOLUTION RESPIRATORY (INHALATION) at 15:33

## 2023-10-31 RX ADMIN — METOPROLOL TARTRATE 50 MG: 50 TABLET, FILM COATED ORAL at 16:54

## 2023-10-31 RX ADMIN — LABETALOL HYDROCHLORIDE 10 MG: 5 INJECTION INTRAVENOUS at 11:55

## 2023-10-31 RX ADMIN — HYDROMORPHONE HYDROCHLORIDE 0.5 MG: 1 INJECTION, SOLUTION INTRAMUSCULAR; INTRAVENOUS; SUBCUTANEOUS at 10:13

## 2023-10-31 RX ADMIN — HYDROMORPHONE HYDROCHLORIDE 0.5 MG: 1 INJECTION, SOLUTION INTRAMUSCULAR; INTRAVENOUS; SUBCUTANEOUS at 23:16

## 2023-10-31 RX ADMIN — TERAZOSIN HYDROCHLORIDE 2 MG: 2 CAPSULE ORAL at 16:55

## 2023-10-31 RX ADMIN — ROPINIROLE HYDROCHLORIDE 3 MG: 2 TABLET, FILM COATED ORAL at 20:58

## 2023-10-31 RX ADMIN — FUROSEMIDE 40 MG: 10 INJECTION, SOLUTION INTRAMUSCULAR; INTRAVENOUS at 10:14

## 2023-10-31 RX ADMIN — ENOXAPARIN SODIUM 150 MG: 150 INJECTION SUBCUTANEOUS at 17:02

## 2023-10-31 RX ADMIN — PRAVASTATIN SODIUM 40 MG: 20 TABLET ORAL at 17:00

## 2023-10-31 RX ADMIN — HYDROMORPHONE HYDROCHLORIDE 0.5 MG: 1 INJECTION, SOLUTION INTRAMUSCULAR; INTRAVENOUS; SUBCUTANEOUS at 05:16

## 2023-10-31 RX ADMIN — FUROSEMIDE 40 MG: 10 INJECTION, SOLUTION INTRAMUSCULAR; INTRAVENOUS at 15:38

## 2023-10-31 RX ADMIN — METOPROLOL TARTRATE 50 MG: 50 TABLET, FILM COATED ORAL at 10:09

## 2023-10-31 RX ADMIN — ALBUTEROL SULFATE 2.5 MG: 2.5 SOLUTION RESPIRATORY (INHALATION) at 09:05

## 2023-10-31 ASSESSMENT — ENCOUNTER SYMPTOMS
CONSTIPATION: 0
WEAKNESS: 1
ABDOMINAL PAIN: 1
SHORTNESS OF BREATH: 1
NAUSEA: 1

## 2023-10-31 ASSESSMENT — PAIN DESCRIPTION - PAIN TYPE
TYPE: ACUTE PAIN

## 2023-10-31 ASSESSMENT — COPD QUESTIONNAIRES
COPD SCREENING SCORE: 7
HAVE YOU SMOKED AT LEAST 100 CIGARETTES IN YOUR ENTIRE LIFE: YES
DO YOU EVER COUGH UP ANY MUCUS OR PHLEGM?: YES, A FEW DAYS A WEEK OR MONTH
DURING THE PAST 4 WEEKS HOW MUCH DID YOU FEEL SHORT OF BREATH: SOME OF THE TIME

## 2023-10-31 NOTE — DISCHARGE PLANNING
0752  Per RN CM request.  Agency/Facility Name: Local [place] [facility]  Sent Referral per Choice Form at: 0752 am    0851  Per RN CM request.  Agency/Facility Name: [facility]  Sent Referral per Choice Form at: 0851 am    1128  Agency/Facility Name: [facility]  Spoke To: Farhat  Outcome: Per Farhat needs PT/OT notes, Pt referral still under review.  RN CM notified via Teams.    1135  Agency/Facility Name: [facility]  Spoke To: Shruti  Outcome: Per Shruti if Pt has his own CPAP equipment then can accept Pt. DPA un able to answer Shruti ATT, DPA to follow up with RN CM.  RN CM notified via Teams.

## 2023-10-31 NOTE — PROGRESS NOTES
Patient arrived to floor via hospital bed. Patient ambulated up to room bed handheld assist. Patient on 3L NC with oxygen saturation maintained above 90%. RT notified of patient transfer and CPAP brought to patient. Patient medicated per MAR. POC discussed with patient. Call light within reach.

## 2023-10-31 NOTE — CARE PLAN
The patient is Watcher - Medium risk of patient condition declining or worsening    Shift Goals  Clinical Goals: OOB, pain control, N&V control, NG care  Patient Goals: comfort  Family Goals: family not here    Progress made toward(s) clinical / shift goals:  Patient has no complaints of nausea and vomiting while NG was clamped. Encouraged pulmonary hygiene.      Patient is not progressing towards the following goals: RT consulted for dyspnea on exertion. Not able to wean off oxygen.          Problem: Knowledge Deficit - Standard  Goal: Patient and family/care givers will demonstrate understanding of plan of care, disease process/condition, diagnostic tests and medications  Outcome: Progressing     Problem: Pain - Standard  Goal: Alleviation of pain or a reduction in pain to the patient’s comfort goal  Outcome: Progressing

## 2023-10-31 NOTE — PROGRESS NOTES
4 Eyes Skin Assessment Completed by WILTON Rea and WILTON Diaz.    Head WDL  Ears WDL  Nose: R nare NGT  Mouth WDL  Neck WDL  Breast/Chest WDL  Shoulder Blades Redness and Blanching  Spine Redness and Blanching  (R) Arm/Elbow/Hand Scab  (L) Arm/Elbow/Hand WDL  Abdomen Hernia  Groin WDL  Scrotum/Coccyx/Buttocks Redness and Blanching  (R) Leg WDL  (L) Leg WDL  (R) Heel/Foot/Toe WDL  (L) Heel/Foot/Toe WDL          Devices In Places Blood Pressure Cuff, Pulse Ox, OG/NG, and Nasal Cannula      Interventions In Place Gray Ear Foams    Possible Skin Injury No    Pictures Uploaded Into Epic N/A  Wound Consult Placed N/A  RN Wound Prevention Protocol Ordered No

## 2023-10-31 NOTE — PROGRESS NOTES
Hospital Medicine Daily Progress Note    Date of Service  10/31/2023    Chief Complaint  Yonny Wallace is a 63 y.o. male admitted 10/28/2023 with abd pain    Hospital Course  62 yo man with A-fib, CHF, HTN, HLD who has had multiple prior abdominal surgeries and SBO who presented with abdominal pain and had CTAP that showed an SBO.  Surgery was consulted.  He was decompressed with NG tube.    Interval Problem Update  NG tube 50 cc  New leukocytosis 16.7  He feels short of breath since last night.  Hypertensive up to SBP 190s.  He had a BM today.  Discussed with surgery and we will clamp his tube and restart his oral home medications.  His BNP is very high.  Chest x-ray shows pulmonary edema.  I ordered for IV Lasix diuresis    I have discussed this patient's plan of care and discharge plan at IDT rounds today with Case Management, Nursing, Nursing leadership, and other members of the IDT team.    Consultants/Specialty  general surgery    Code Status  Full Code    Disposition  The patient is not medically cleared for discharge to home or a post-acute facility.  Anticipate discharge to: skilled nursing facility    I have placed the appropriate orders for post-discharge needs.    Review of Systems  Review of Systems   Constitutional:  Positive for malaise/fatigue.   Respiratory:  Positive for shortness of breath.    Cardiovascular:  Negative for chest pain.   Gastrointestinal:  Positive for abdominal pain and nausea. Negative for constipation.   Neurological:  Positive for weakness.        Physical Exam  Temp:  [36.2 °C (97.2 °F)-36.9 °C (98.4 °F)] 36.2 °C (97.2 °F)  Pulse:  [] 115  Resp:  [20-30] 28  BP: (136-190)/() 190/107  SpO2:  [90 %-95 %] 91 %    Physical Exam  Vitals and nursing note reviewed.   Constitutional:       General: He is not in acute distress.     Appearance: He is not toxic-appearing.   HENT:      Head: Normocephalic.      Mouth/Throat:      Mouth: Mucous membranes are moist.   Eyes:       General:         Right eye: No discharge.         Left eye: No discharge.   Cardiovascular:      Rate and Rhythm: Regular rhythm. Tachycardia present.   Pulmonary:      Breath sounds: No wheezing or rales.      Comments: Tachypneic, poor air movement  Abdominal:      General: There is distension.      Palpations: Abdomen is soft.      Tenderness: There is no abdominal tenderness. There is no guarding or rebound.      Comments: Large right-sided hernia   Musculoskeletal:      Cervical back: Neck supple.      Right lower leg: Edema present.      Left lower leg: Edema present.   Skin:     General: Skin is warm and dry.   Neurological:      Mental Status: He is alert and oriented to person, place, and time.         Fluids    Intake/Output Summary (Last 24 hours) at 10/31/2023 1423  Last data filed at 10/31/2023 1138  Gross per 24 hour   Intake --   Output 2410 ml   Net -2410 ml       Laboratory  Recent Labs     10/29/23  0508 10/30/23  0216 10/31/23  0654   WBC 13.6* 9.0 16.7*   RBC 4.95 4.30* 4.52*   HEMOGLOBIN 15.7 14.0 14.3   HEMATOCRIT 47.4 42.8 45.3   MCV 95.8 99.5* 100.2*   MCH 31.7 32.6 31.6   MCHC 33.1 32.7 31.6*   RDW 47.2 50.5* 50.4*   PLATELETCT 336 290 287   MPV 10.6 10.5 10.5     Recent Labs     10/29/23  0624 10/30/23  0216 10/31/23  0654   SODIUM 140 142 143   POTASSIUM 4.2 3.9 4.1   CHLORIDE 99 105 107   CO2 27 29 23   GLUCOSE 184* 140* 142*   BUN 34* 24* 18   CREATININE 1.30 1.00 0.84   CALCIUM 9.4 8.6 8.9     Recent Labs     10/28/23  1940   INR 1.04               Imaging  DX-CHEST-PORTABLE (1 VIEW)   Final Result      1.  Cardiomegaly.   2.  Bilateral interstitial edema and probable small pleural effusions.      DX-ABDOMEN FOR TUBE PLACEMENT   Final Result         Gastric drainage tube with tip projecting over the expected area of the stomach. Sidehole at GE junction.      [id number] VIEW   Final Result      Stable findings of small bowel instruction. Consider advancing the nasogastric tube further  into the stomach.      OUTSIDE IMAGES-CT ABDOMEN /PELVIS   Final Result      OUTSIDE IMAGES-DX CHEST   Final Result           Assessment/Plan  * SBO (small bowel obstruction) (HCC)- (present on admission)  Assessment & Plan  CT scan at outside facility shows SBO, has a history of SBO with admission for this at this facility earlier this year.  Has had many abdominal surgeries in the past  Surgery team consulted and following  Clamping NG tube today, clear liquid diet    Acute respiratory failure with hypoxia (HCC)- (present on admission)  Assessment & Plan  Chest x-ray showed pulmonary edema and small pleural effusions  BNP is high, ordered for IV Lasix diuresis  Leukocytosis of 16, however procalcitonin was low.  May be reactive.  Trend CBC for now    SUHAS (acute kidney injury) (HCC)- (present on admission)  Assessment & Plan  His initial SUHAS had resolved  Ordered for IV Lasix, monitor renal function    Chronic systolic congestive heart failure (HCC)- (present on admission)  Assessment & Plan  10/30/2023    Recovered EF, EF from earlier this year was 55%  He is now hypertensive with pulmonary edema.  Ordered for IV Lasix twice daily, monitor urine output.  Restarted Norvasc, metoprolol, Entresto.  IV labetalol and hydralazine as needed for blood pressure control      Mixed hyperlipidemia- (present on admission)  Assessment & Plan  Continue home pravastatin    Atrial fibrillation with RVR (HCC)- (present on admission)  Assessment & Plan  He is tachycardic, check EKG for A-fib RVR  Restarted home metoprolol  Holding Pradaxa until certain there will be no surgery.  On therapeutic Lovenox for now    HEATHER (obstructive sleep apnea)- (present on admission)  Assessment & Plan  CPAP ordered    Primary hypertension- (present on admission)  Assessment & Plan  Hypertensive urgency   with pulmonary edema.  Ordered for IV Lasix twice daily, monitor urine output.  Restarted Norvasc, metoprolol, Entresto.  IV labetalol and  hydralazine as needed for blood pressure control         VTE prophylaxis:    enoxaparin ppx      I have performed a physical exam and reviewed and updated ROS and Plan today (10/31/2023). In review of yesterday's note (10/30/2023), there are no changes except as documented above.

## 2023-10-31 NOTE — PROGRESS NOTES
Assumed care of patient at 1900, Pt complaining of pain with elevated BP. PRN pain medication given per orders. Pt otherwise with no complaints. Pt AAOx4 and pleaseant. Report given to Rona NÚÑEZ. Repeat BP taken, . PRN Labetalol given, Rona NÚÑEZ notified prior to transfer to floor. Pt transported to floor via bed, accompanied by transport.

## 2023-10-31 NOTE — CARE PLAN
The patient is Stable - Low risk of patient condition declining or worsening    Shift Goals  Clinical Goals: pain management  Patient Goals: comfort  Family Goals: family not here    Progress made toward(s) clinical / shift goals:    Problem: Knowledge Deficit - Standard  Goal: Patient and family/care givers will demonstrate understanding of plan of care, disease process/condition, diagnostic tests and medications  Outcome: Progressing     Problem: Pain - Standard  Goal: Alleviation of pain or a reduction in pain to the patient’s comfort goal  Outcome: Progressing     Problem: Skin Integrity  Goal: Skin integrity is maintained or improved  Outcome: Progressing     Problem: Fall Risk  Goal: Patient will remain free from falls  Outcome: Progressing     Problem: Hemodynamics  Goal: Patient's hemodynamics, fluid balance and neurologic status will be stable or improve  Outcome: Progressing     Problem: Respiratory  Goal: Patient will achieve/maintain optimum respiratory ventilation and gas exchange  Outcome: Progressing       Patient is not progressing towards the following goals:

## 2023-10-31 NOTE — PROGRESS NOTES
Hospital Medicine Daily Progress Note    Date of Service  10/30/2023    Chief Complaint  Yonny Wallace is a 63 y.o. male admitted 10/28/2023 with abdominal pain    Hospital Course  Yonny Wallace is a 63 y.o. male who was transferred from outside facility 10/28/2023 with SBO.  PMH of A-fib on anticoagulation, hypertension, dyslipidemia, prediabetes, CHF.  Patient has a history of many abdominal surgeries in the past including splenectomy, appendectomy, colectomy, lysis adhesion, multiple hernia repairs.  He has had multiple SBO's in the past as well, most recently 02/2023 when he was admitted here for this and had an expiratory laparotomy and adhesion lysis.  CT abdomen at outside facility does show bowel obstruction.  Labs showed lactic acidosis up to 4.  Patient is in significant amount of pain and tachycardic but BP stable.  Transferred for possible surgical evaluation.     On my evaluation afebrile, tachycardic, BP stable.  Continues to have pain.  Labs at outside facility showed a WBC of 16.7, creatinine 1.7.  Repeat lactic acid here 2.3.    I requested consultation with general surgery Dr. Wells.  Surgery team recommended medical management.    Interval Problem Update    10/29/23    I evaluated and examined him at the bedside.  He reported the last time he passed gas was yesterday.  Currently he is on NG tube with intermittent wall suctioning.  I requested consultation with surgery Dr. Wells.    10/30/23    I evaluated and examined him at the bedside.  He reported that he is feeling better.  He explains that he was able to passed flatus.  Surgery team is following him.  Continue providing symptomatic management  Continue NG intermittent suctioning.  I discussed plan of care with him and answered all his questions.      I have discussed this patient's plan of care and discharge plan at IDT rounds today with Case Management, Nursing, Nursing leadership, and other members of the IDT  team.    Consultants/Specialty  general surgery    Code Status  Full Code    Disposition  The patient is not medically cleared for discharge to home or a post-acute facility.  Anticipate discharge to: home with close outpatient follow-up    I have placed the appropriate orders for post-discharge needs.    Review of Systems  Review of Systems   Constitutional:  Negative for chills, fever and weight loss.   HENT:  Negative for hearing loss and tinnitus.    Eyes:  Negative for blurred vision, double vision, photophobia and pain.   Respiratory:  Negative for cough, sputum production and shortness of breath.    Cardiovascular:  Negative for chest pain, palpitations, orthopnea and leg swelling.   Gastrointestinal:  Positive for abdominal pain (Mild). Negative for constipation, diarrhea, nausea and vomiting.   Genitourinary:  Negative for dysuria, frequency and urgency.   Musculoskeletal:  Negative for back pain, joint pain, myalgias and neck pain.   Skin:  Negative for rash.   Neurological:  Negative for dizziness, tingling, tremors, sensory change, speech change, focal weakness and headaches.   Psychiatric/Behavioral:  Negative for hallucinations and substance abuse.    All other systems reviewed and are negative.       Physical Exam  Temp:  [36.6 °C (97.8 °F)-36.8 °C (98.2 °F)] 36.8 °C (98.2 °F)  Pulse:  [] 120  Resp:  [4-29] 26  BP: (125-177)/(62-97) 176/97  SpO2:  [94 %-97 %] 94 %    Physical Exam  Vitals reviewed.   Constitutional:       General: He is not in acute distress.  HENT:      Head: Normocephalic and atraumatic. No contusion.      Right Ear: External ear normal.      Left Ear: External ear normal.      Nose: Nose normal.      Mouth/Throat:      Mouth: Mucous membranes are dry.      Pharynx: No oropharyngeal exudate.   Eyes:      General:         Right eye: No discharge.         Left eye: No discharge.      Pupils: Pupils are equal, round, and reactive to light.   Cardiovascular:      Rate and Rhythm:  Normal rate and regular rhythm.      Heart sounds: No murmur heard.     No friction rub. No gallop.   Pulmonary:      Effort: Pulmonary effort is normal.      Breath sounds: No wheezing or rhonchi.   Abdominal:      General: Bowel sounds are normal. There is no distension.      Palpations: Abdomen is soft.      Tenderness: There is no abdominal tenderness. There is no rebound.      Comments: Anterior abdominal wall hernia  No signs of acute abdomen on physical exam.   Musculoskeletal:         General: No swelling or tenderness. Normal range of motion.      Cervical back: No rigidity. No muscular tenderness.   Skin:     General: Skin is warm and dry.      Coloration: Skin is not jaundiced.   Neurological:      General: No focal deficit present.      Mental Status: He is alert.      Cranial Nerves: No cranial nerve deficit.      Sensory: No sensory deficit.   Psychiatric:         Mood and Affect: Mood normal.         Fluids    Intake/Output Summary (Last 24 hours) at 10/30/2023 1837  Last data filed at 10/30/2023 0600  Gross per 24 hour   Intake 1740.84 ml   Output 525 ml   Net 1215.84 ml         Laboratory  Recent Labs     10/28/23  1940 10/29/23  0508 10/30/23  0216   WBC 16.7* 13.6* 9.0   RBC 5.04 4.95 4.30*   HEMOGLOBIN 16.4 15.7 14.0   HEMATOCRIT 47.4 47.4 42.8   MCV 94.0 95.8 99.5*   MCH 32.5* 31.7 32.6   MCHC 34.6 33.1 32.7   RDW 13.2 47.2 50.5*   PLATELETCT 334 336 290   MPV 10.0 10.6 10.5       Recent Labs     10/28/23  1940 10/29/23  0624 10/30/23  0216   SODIUM 138 140 142   POTASSIUM 3.9 4.2 3.9   CHLORIDE 98 99 105   CO2 23 27 29   GLUCOSE 149* 184* 140*   BUN 29* 34* 24*   CREATININE 1.7* 1.30 1.00   CALCIUM 9.6 9.4 8.6       Recent Labs     10/28/23  1940   INR 1.04                 Imaging  DX-ABDOMEN FOR TUBE PLACEMENT   Final Result         Gastric drainage tube with tip projecting over the expected area of the stomach. Sidehole at GE junction.      [id number] VIEW   Final Result      Stable  findings of small bowel instruction. Consider advancing the nasogastric tube further into the stomach.      OUTSIDE IMAGES-CT ABDOMEN /PELVIS   Final Result      OUTSIDE IMAGES-DX CHEST   Final Result           Assessment/Plan  * SBO (small bowel obstruction) (HCC)- (present on admission)  Assessment & Plan  CT scan at outside facility shows SBO, has a history of SBO with admission for this at this facility earlier this year.  Has had many abdominal surgeries in the past  NG tube started prior to transfer, continue.  Strict n.p.o., IV fluids, narcotic pain management due to severe pain.  Needs close hemodynamic and respiratory status monitoring  Patient does not want surgical intervention unless absolutely necessary.  Continue with medical management for now, if worsens or lactic acid continues to trend up then surgical consult  He passed flatus today.  Surgery team is following him.  Continue to monitor closely with serial abdominal examination.  I discussed plan of care with him and answered all his questions.    SUHAS (acute kidney injury) ([facility])- (present on admission)  Assessment & Plan  Creatinine outside facility 1.7, baseline about 0.9  Renal function now significantly improved  I ordered BMP for tomorrow.    Chronic systolic congestive heart failure (HCC)- (present on admission)  Assessment & Plan  10/30/2023    Recovered EF, EF from earlier this year was 55%  Euvolemic at this time  Hold metoprolol, Entresto      Mixed hyperlipidemia- (present on admission)  Assessment & Plan  Restart home meds when no longer n.p.o.    Atrial fibrillation with RVR (HCC)- (present on admission)  Assessment & Plan  History of A-fib, in RVR secondary to pain.  Has not been unable to take his metoprolol due to abdominal pain  As needed IV metoprolol ordered, if unable to control heart rate we will try diltiazem  Pain control  I started patient on anticoagulation with Lovenox as patient is n.p.o. and cannot use Pradaxa.  Continue  monitor closely.      HEATHER (obstructive sleep apnea)- (present on admission)  Assessment & Plan  CPAP ordered    Primary hypertension- (present on admission)  Assessment & Plan  Strict n.p.o. due to SBO.  As needed antihypertensives ordered  Restart home meds when able       I discussed plan of care during multidisciplinary rounds regarding patient's current medical condition and plan of care.    >51 minutes total time spent in records review, lab/radiology assessment, patient history and assessment, and directing plan of care with high level medical decision making complexity. See orders.      VTE prophylaxis:    therapeutic anticoagulation with weight-based lovenox BID, 1 mg/kg      I have performed a physical exam and reviewed and updated ROS and Plan today (10/30/2023). In review of yesterday's note (10/29/2023), there are no changes except as documented above.

## 2023-10-31 NOTE — PROGRESS NOTES
Bedside report received.  Assessment complete.  A&O x 4. Patient calls appropriately.  Patient ambulates with X1P assist. Patient has 7/10 pain. Pain managed with prescribed medications with relief.   Denies N&V. Patient on strict NPO.   R Nare NG on low continuous suction.    Skin per flowsheet.    +output via [place], + flatus, Last BM 10/30  Wheezing noted upon auscultation, dyspnea note at rest on O2 at  3LPM via oxymask. Generalized swelling noted.    SCD's off.  Patient is pleasant and cooperative to plan of care.  Review plan with of care with patient. Call light and personal belongings within reach. Hourly rounding in place. All needs met at this time.

## 2023-10-31 NOTE — CARE PLAN
The patient is Stable - Low risk of patient condition declining or worsening    Shift Goals  Clinical Goals: have BM; Q2H pain mgmt  Patient Goals: comfort  Family Goals: family not here    Progress made toward(s) clinical / shift goals:  Patient had x1 large BM during NOC shift with reported relief. Patient continues to require Q2H pain medications    Problem: Knowledge Deficit - Standard  Goal: Patient and family/care givers will demonstrate understanding of plan of care, disease process/condition, diagnostic tests and medications  10/31/2023 0114 by Rona Valles, R.N.  Outcome: Progressing  10/31/2023 0114 by Rona Valles, R.N.  Outcome: Progressing     Problem: Pain - Standard  Goal: Alleviation of pain or a reduction in pain to the patient’s comfort goal  10/31/2023 0114 by Rona Valles, R.N.  Outcome: Progressing  10/31/2023 0114 by Rona Valles, R.N.  Outcome: Progressing     Problem: Bowel Elimination  Goal: Establish and maintain regular bowel function  Outcome: Progressing       Patient is not progressing towards the following goals:

## 2023-10-31 NOTE — ASSESSMENT & PLAN NOTE
Chest x-ray showed pulmonary edema and small pleural effusions due to hypertensive urgency  Continue IV diuresis and weaning down oxygen  Oxygenation improving  Leukocytosis of 15, however procalcitonin was low.  May be reactive.  WBC has down trended

## 2023-10-31 NOTE — PROGRESS NOTES
"  DATE: 10/31/2023    Hospital Day 3  small bowel obstruction .    INTERVAL EVENTS:  No nausea, + BM.  NG tube with 50 cc bilious drainage.  Poor respiratory status, has not been taking home medications for CHF.  WBC 16.7.    REVIEW OF SYSTEMS:  Comprehensive review of systems is negative with the exception of the aforementioned HPI, PMH, and PSH bullets in accordance with CMS guidelines.    PHYSICAL EXAMINATION:  Vital Signs: BP (!) 190/104   Pulse (!) 113   Temp 36.4 °C (97.5 °F) (Temporal)   Resp (!) 24   Ht 1.702 m (5' 7\")   Wt (!) 138 kg (303 lb 9.2 oz)   SpO2 93%   Physical Exam  Vitals and nursing note reviewed.   Constitutional:       Appearance: He is obese.      Interventions: Nasal cannula in place.   HENT:      Head: Normocephalic and atraumatic.      Right Ear: External ear normal.      Left Ear: External ear normal.      Nose:      Comments: Nasogastric tube      Mouth/Throat:      Pharynx: Oropharynx is clear.   Eyes:      Pupils: Pupils are equal, round, and reactive to light.   Cardiovascular:      Rate and Rhythm: Regular rhythm. Tachycardia present.      Pulses: Normal pulses.   Pulmonary:      Effort: Pulmonary effort is normal.   Abdominal:      General: There is distension.      Hernia: A hernia is present. Hernia is present in the ventral area.      Comments: Very mild tenderness over hernia   Skin:     General: Skin is warm.   Neurological:      Mental Status: He is alert and oriented to person, place, and time.   Psychiatric:         Mood and Affect: Mood normal.         LABORATORY VALUES:  Recent Labs     10/29/23  0508 10/30/23  0216 10/31/23  0654   WBC 13.6* 9.0 16.7*   RBC 4.95 4.30* 4.52*   HEMOGLOBIN 15.7 14.0 14.3   HEMATOCRIT 47.4 42.8 45.3   MCV 95.8 99.5* 100.2*   MCH 31.7 32.6 31.6   MCHC 33.1 32.7 31.6*   RDW 47.2 50.5* 50.4*   PLATELETCT 336 290 287   MPV 10.6 10.5 10.5       Recent Labs     10/29/23  0624 10/30/23  0216 10/31/23  0654   SODIUM 140 142 143   POTASSIUM 4.2 " 3.9 4.1   CHLORIDE 99 105 107   CO2 27 29 23   GLUCOSE 184* 140* 142*   BUN 34* 24* 18   CREATININE 1.30 1.00 0.84   CALCIUM 9.4 8.6 8.9       Recent Labs     10/28/23  1940 10/31/23  0654   ASTSGOT 26 12   ALTSGPT 33 12   TBILIRUBIN 0.6 1.1   ALKPHOSPHAT 68 49   GLOBULIN  --  3.4   INR 1.04  --        Recent Labs     10/28/23  1940   INR 1.04         IMAGING:  DX-ABDOMEN FOR TUBE PLACEMENT   Final Result         Gastric drainage tube with tip projecting over the expected area of the stomach. Sidehole at GE junction.      [id number] VIEW   Final Result      Stable findings of small bowel instruction. Consider advancing the nasogastric tube further into the stomach.      OUTSIDE IMAGES-CT ABDOMEN /PELVIS   Final Result      OUTSIDE IMAGES-DX CHEST   Final Result      DX-CHEST-PORTABLE (1 VIEW)    (Results Pending)       ASSESSMENT AND PLAN:    * SBO (small bowel obstruction) (HCC)- (present on admission)  Assessment & Plan  Mult abdominal surgeries  Last Ex lap BARBARA 2/23  Now with recurrent SBO  NGT   Non op management  10/31 + BM, clamp NG tube and start sips of clears      -- RT consult and treat  -- Hospital medicine to resume home medications  -- Mobilize as tolerated       ____________________________________     Maylin Brown P.A.-C.    DD: 10/30/2023  9:10 AM

## 2023-11-01 LAB
ALBUMIN SERPL BCP-MCNC: 3.4 G/DL (ref 3.2–4.9)
ALBUMIN SERPL BCP-MCNC: 3.8 G/DL (ref 3.2–4.9)
BASOPHILS # BLD AUTO: 0.4 % (ref 0–1.8)
BASOPHILS # BLD: 0.06 K/UL (ref 0–0.12)
BUN SERPL-MCNC: 21 MG/DL (ref 8–22)
BUN SERPL-MCNC: 21 MG/DL (ref 8–22)
CALCIUM ALBUM COR SERPL-MCNC: 9.4 MG/DL (ref 8.5–10.5)
CALCIUM ALBUM COR SERPL-MCNC: 9.5 MG/DL (ref 8.5–10.5)
CALCIUM SERPL-MCNC: 8.9 MG/DL (ref 8.5–10.5)
CALCIUM SERPL-MCNC: 9.3 MG/DL (ref 8.5–10.5)
CHLORIDE SERPL-SCNC: 102 MMOL/L (ref 96–112)
CHLORIDE SERPL-SCNC: 98 MMOL/L (ref 96–112)
CO2 SERPL-SCNC: 25 MMOL/L (ref 20–33)
CO2 SERPL-SCNC: 27 MMOL/L (ref 20–33)
CREAT SERPL-MCNC: 0.79 MG/DL (ref 0.5–1.4)
CREAT SERPL-MCNC: 0.86 MG/DL (ref 0.5–1.4)
EKG IMPRESSION: NORMAL
EOSINOPHIL # BLD AUTO: 0.05 K/UL (ref 0–0.51)
EOSINOPHIL NFR BLD: 0.3 % (ref 0–6.9)
ERYTHROCYTE [DISTWIDTH] IN BLOOD BY AUTOMATED COUNT: 47.4 FL (ref 35.9–50)
GFR SERPLBLD CREATININE-BSD FMLA CKD-EPI: 96 ML/MIN/1.73 M 2
GFR SERPLBLD CREATININE-BSD FMLA CKD-EPI: 98 ML/MIN/1.73 M 2
GLUCOSE BLD STRIP.AUTO-MCNC: 107 MG/DL (ref 65–99)
GLUCOSE BLD STRIP.AUTO-MCNC: 147 MG/DL (ref 65–99)
GLUCOSE BLD STRIP.AUTO-MCNC: 153 MG/DL (ref 65–99)
GLUCOSE BLD STRIP.AUTO-MCNC: 158 MG/DL (ref 65–99)
GLUCOSE BLD STRIP.AUTO-MCNC: 191 MG/DL (ref 65–99)
GLUCOSE SERPL-MCNC: 127 MG/DL (ref 65–99)
GLUCOSE SERPL-MCNC: 151 MG/DL (ref 65–99)
HCT VFR BLD AUTO: 44.3 % (ref 42–52)
HGB BLD-MCNC: 14.7 G/DL (ref 14–18)
IMM GRANULOCYTES # BLD AUTO: 0.07 K/UL (ref 0–0.11)
IMM GRANULOCYTES NFR BLD AUTO: 0.5 % (ref 0–0.9)
LYMPHOCYTES # BLD AUTO: 1.47 K/UL (ref 1–4.8)
LYMPHOCYTES NFR BLD: 9.7 % (ref 22–41)
MAGNESIUM SERPL-MCNC: 2.1 MG/DL (ref 1.5–2.5)
MAGNESIUM SERPL-MCNC: 2.2 MG/DL (ref 1.5–2.5)
MCH RBC QN AUTO: 32.4 PG (ref 27–33)
MCHC RBC AUTO-ENTMCNC: 33.2 G/DL (ref 32.3–36.5)
MCV RBC AUTO: 97.6 FL (ref 81.4–97.8)
MONOCYTES # BLD AUTO: 1.67 K/UL (ref 0–0.85)
MONOCYTES NFR BLD AUTO: 11 % (ref 0–13.4)
NEUTROPHILS # BLD AUTO: 11.9 K/UL (ref 1.82–7.42)
NEUTROPHILS NFR BLD: 78.1 % (ref 44–72)
NRBC # BLD AUTO: 0.03 K/UL
NRBC BLD-RTO: 0.2 /100 WBC (ref 0–0.2)
PHOSPHATE SERPL-MCNC: 2.2 MG/DL (ref 2.5–4.5)
PHOSPHATE SERPL-MCNC: 2.9 MG/DL (ref 2.5–4.5)
PLATELET # BLD AUTO: 280 K/UL (ref 164–446)
PMV BLD AUTO: 10.6 FL (ref 9–12.9)
POTASSIUM SERPL-SCNC: 3.6 MMOL/L (ref 3.6–5.5)
POTASSIUM SERPL-SCNC: 3.7 MMOL/L (ref 3.6–5.5)
RBC # BLD AUTO: 4.54 M/UL (ref 4.7–6.1)
SODIUM SERPL-SCNC: 137 MMOL/L (ref 135–145)
SODIUM SERPL-SCNC: 139 MMOL/L (ref 135–145)
WBC # BLD AUTO: 15.2 K/UL (ref 4.8–10.8)

## 2023-11-01 PROCEDURE — 83735 ASSAY OF MAGNESIUM: CPT

## 2023-11-01 PROCEDURE — 700111 HCHG RX REV CODE 636 W/ 250 OVERRIDE (IP): Performed by: STUDENT IN AN ORGANIZED HEALTH CARE EDUCATION/TRAINING PROGRAM

## 2023-11-01 PROCEDURE — 770020 HCHG ROOM/CARE - TELE (206)

## 2023-11-01 PROCEDURE — 36415 COLL VENOUS BLD VENIPUNCTURE: CPT

## 2023-11-01 PROCEDURE — 700102 HCHG RX REV CODE 250 W/ 637 OVERRIDE(OP): Performed by: INTERNAL MEDICINE

## 2023-11-01 PROCEDURE — 99231 SBSQ HOSP IP/OBS SF/LOW 25: CPT | Performed by: NURSE PRACTITIONER

## 2023-11-01 PROCEDURE — 94660 CPAP INITIATION&MGMT: CPT

## 2023-11-01 PROCEDURE — 700102 HCHG RX REV CODE 250 W/ 637 OVERRIDE(OP): Performed by: STUDENT IN AN ORGANIZED HEALTH CARE EDUCATION/TRAINING PROGRAM

## 2023-11-01 PROCEDURE — 99232 SBSQ HOSP IP/OBS MODERATE 35: CPT | Performed by: INTERNAL MEDICINE

## 2023-11-01 PROCEDURE — 85025 COMPLETE CBC W/AUTO DIFF WBC: CPT

## 2023-11-01 PROCEDURE — 700111 HCHG RX REV CODE 636 W/ 250 OVERRIDE (IP): Performed by: INTERNAL MEDICINE

## 2023-11-01 PROCEDURE — 80069 RENAL FUNCTION PANEL: CPT

## 2023-11-01 PROCEDURE — 700111 HCHG RX REV CODE 636 W/ 250 OVERRIDE (IP): Mod: JZ | Performed by: INTERNAL MEDICINE

## 2023-11-01 PROCEDURE — 700105 HCHG RX REV CODE 258: Performed by: INTERNAL MEDICINE

## 2023-11-01 PROCEDURE — 93010 ELECTROCARDIOGRAM REPORT: CPT | Performed by: INTERNAL MEDICINE

## 2023-11-01 PROCEDURE — A9270 NON-COVERED ITEM OR SERVICE: HCPCS | Performed by: INTERNAL MEDICINE

## 2023-11-01 PROCEDURE — A9270 NON-COVERED ITEM OR SERVICE: HCPCS | Performed by: STUDENT IN AN ORGANIZED HEALTH CARE EDUCATION/TRAINING PROGRAM

## 2023-11-01 PROCEDURE — 97162 PT EVAL MOD COMPLEX 30 MIN: CPT

## 2023-11-01 PROCEDURE — 82962 GLUCOSE BLOOD TEST: CPT | Mod: 91

## 2023-11-01 PROCEDURE — 700101 HCHG RX REV CODE 250: Performed by: INTERNAL MEDICINE

## 2023-11-01 RX ORDER — OXYCODONE HYDROCHLORIDE 5 MG/1
5-10 TABLET ORAL EVERY 4 HOURS PRN
Status: DISCONTINUED | OUTPATIENT
Start: 2023-11-01 | End: 2023-11-03 | Stop reason: HOSPADM

## 2023-11-01 RX ORDER — POTASSIUM CHLORIDE 20 MEQ/1
20 TABLET, EXTENDED RELEASE ORAL ONCE
Status: COMPLETED | OUTPATIENT
Start: 2023-11-01 | End: 2023-11-01

## 2023-11-01 RX ORDER — METOPROLOL SUCCINATE 100 MG/1
100 TABLET, EXTENDED RELEASE ORAL EVERY EVENING
Status: DISCONTINUED | OUTPATIENT
Start: 2023-11-01 | End: 2023-11-03 | Stop reason: HOSPADM

## 2023-11-01 RX ORDER — FLUOXETINE HYDROCHLORIDE 20 MG/1
60 CAPSULE ORAL DAILY
Status: DISCONTINUED | OUTPATIENT
Start: 2023-11-02 | End: 2023-11-03 | Stop reason: HOSPADM

## 2023-11-01 RX ORDER — AMLODIPINE BESYLATE 10 MG/1
5 TABLET ORAL DAILY
Status: DISCONTINUED | OUTPATIENT
Start: 2023-11-02 | End: 2023-11-03 | Stop reason: HOSPADM

## 2023-11-01 RX ORDER — HYDROMORPHONE HYDROCHLORIDE 1 MG/ML
0.5 INJECTION, SOLUTION INTRAMUSCULAR; INTRAVENOUS; SUBCUTANEOUS
Status: DISCONTINUED | OUTPATIENT
Start: 2023-11-01 | End: 2023-11-03 | Stop reason: HOSPADM

## 2023-11-01 RX ORDER — TERAZOSIN 2 MG/1
2 CAPSULE ORAL EVERY EVENING
Status: DISCONTINUED | OUTPATIENT
Start: 2023-11-01 | End: 2023-11-03 | Stop reason: HOSPADM

## 2023-11-01 RX ORDER — ALBUTEROL SULFATE 90 UG/1
2 AEROSOL, METERED RESPIRATORY (INHALATION) EVERY 4 HOURS PRN
Status: DISCONTINUED | OUTPATIENT
Start: 2023-11-01 | End: 2023-11-03 | Stop reason: HOSPADM

## 2023-11-01 RX ORDER — PRAVASTATIN SODIUM 20 MG
40 TABLET ORAL EVERY EVENING
Status: DISCONTINUED | OUTPATIENT
Start: 2023-11-01 | End: 2023-11-03 | Stop reason: HOSPADM

## 2023-11-01 RX ORDER — METOPROLOL TARTRATE 50 MG/1
50 TABLET, FILM COATED ORAL 2 TIMES DAILY
Status: DISCONTINUED | OUTPATIENT
Start: 2023-11-01 | End: 2023-11-01

## 2023-11-01 RX ADMIN — SACUBITRIL AND VALSARTAN 1 TABLET: 24; 26 TABLET, FILM COATED ORAL at 17:47

## 2023-11-01 RX ADMIN — HYDROMORPHONE HYDROCHLORIDE 0.5 MG: 1 INJECTION, SOLUTION INTRAMUSCULAR; INTRAVENOUS; SUBCUTANEOUS at 08:32

## 2023-11-01 RX ADMIN — AMLODIPINE BESYLATE 5 MG: 10 TABLET ORAL at 04:20

## 2023-11-01 RX ADMIN — OXYCODONE 10 MG: 5 TABLET ORAL at 17:47

## 2023-11-01 RX ADMIN — TERAZOSIN HYDROCHLORIDE 2 MG: 2 CAPSULE ORAL at 17:47

## 2023-11-01 RX ADMIN — HYDROMORPHONE HYDROCHLORIDE 0.5 MG: 1 INJECTION, SOLUTION INTRAMUSCULAR; INTRAVENOUS; SUBCUTANEOUS at 06:30

## 2023-11-01 RX ADMIN — ENOXAPARIN SODIUM 150 MG: 150 INJECTION SUBCUTANEOUS at 18:31

## 2023-11-01 RX ADMIN — POTASSIUM CHLORIDE 20 MEQ: 1500 TABLET, EXTENDED RELEASE ORAL at 19:43

## 2023-11-01 RX ADMIN — HYDROMORPHONE HYDROCHLORIDE 0.5 MG: 1 INJECTION, SOLUTION INTRAMUSCULAR; INTRAVENOUS; SUBCUTANEOUS at 11:06

## 2023-11-01 RX ADMIN — POTASSIUM PHOSPHATE, MONOBASIC POTASSIUM PHOSPHATE, DIBASIC 30 MMOL: 224; 236 INJECTION, SOLUTION, CONCENTRATE INTRAVENOUS at 08:35

## 2023-11-01 RX ADMIN — METOPROLOL SUCCINATE 100 MG: 100 TABLET, EXTENDED RELEASE ORAL at 17:47

## 2023-11-01 RX ADMIN — ROPINIROLE HYDROCHLORIDE 3 MG: 2 TABLET, FILM COATED ORAL at 21:49

## 2023-11-01 RX ADMIN — FUROSEMIDE 40 MG: 10 INJECTION, SOLUTION INTRAMUSCULAR; INTRAVENOUS at 15:50

## 2023-11-01 RX ADMIN — FUROSEMIDE 40 MG: 10 INJECTION, SOLUTION INTRAMUSCULAR; INTRAVENOUS at 04:19

## 2023-11-01 RX ADMIN — ENOXAPARIN SODIUM 150 MG: 150 INJECTION SUBCUTANEOUS at 04:19

## 2023-11-01 RX ADMIN — INSULIN HUMAN 1 UNITS: 100 INJECTION, SOLUTION PARENTERAL at 23:21

## 2023-11-01 RX ADMIN — SACUBITRIL AND VALSARTAN 1 TABLET: 24; 26 TABLET, FILM COATED ORAL at 04:20

## 2023-11-01 RX ADMIN — FLUOXETINE 60 MG: 20 CAPSULE ORAL at 04:19

## 2023-11-01 RX ADMIN — PRAVASTATIN SODIUM 40 MG: 20 TABLET ORAL at 17:47

## 2023-11-01 RX ADMIN — HYDROMORPHONE HYDROCHLORIDE 0.5 MG: 1 INJECTION, SOLUTION INTRAMUSCULAR; INTRAVENOUS; SUBCUTANEOUS at 02:02

## 2023-11-01 RX ADMIN — INSULIN HUMAN 1 UNITS: 100 INJECTION, SOLUTION PARENTERAL at 11:01

## 2023-11-01 RX ADMIN — METOPROLOL TARTRATE 50 MG: 50 TABLET, FILM COATED ORAL at 04:20

## 2023-11-01 RX ADMIN — OXYCODONE 10 MG: 5 TABLET ORAL at 13:03

## 2023-11-01 RX ADMIN — HYDROMORPHONE HYDROCHLORIDE 0.5 MG: 1 INJECTION, SOLUTION INTRAMUSCULAR; INTRAVENOUS; SUBCUTANEOUS at 04:19

## 2023-11-01 ASSESSMENT — COGNITIVE AND FUNCTIONAL STATUS - GENERAL
TOILETING: A LITTLE
WALKING IN HOSPITAL ROOM: A LITTLE
CLIMB 3 TO 5 STEPS WITH RAILING: A LITTLE
MOBILITY SCORE: 16
DAILY ACTIVITIY SCORE: 21
SUGGESTED CMS G CODE MODIFIER MOBILITY: CK
TURNING FROM BACK TO SIDE WHILE IN FLAT BAD: A LOT
MOVING TO AND FROM BED TO CHAIR: A LOT
DRESSING REGULAR LOWER BODY CLOTHING: A LITTLE
MOVING FROM LYING ON BACK TO SITTING ON SIDE OF FLAT BED: A LITTLE
MOVING TO AND FROM BED TO CHAIR: A LITTLE
STANDING UP FROM CHAIR USING ARMS: A LITTLE
MOVING FROM LYING ON BACK TO SITTING ON SIDE OF FLAT BED: A LITTLE
SUGGESTED CMS G CODE MODIFIER DAILY ACTIVITY: CJ
WALKING IN HOSPITAL ROOM: A LITTLE
STANDING UP FROM CHAIR USING ARMS: A LITTLE
MOBILITY SCORE: 19
SUGGESTED CMS G CODE MODIFIER MOBILITY: CK
HELP NEEDED FOR BATHING: A LITTLE
CLIMB 3 TO 5 STEPS WITH RAILING: A LITTLE

## 2023-11-01 ASSESSMENT — PAIN DESCRIPTION - PAIN TYPE
TYPE: ACUTE PAIN

## 2023-11-01 ASSESSMENT — ENCOUNTER SYMPTOMS
BACK PAIN: 1
WEAKNESS: 1
FEVER: 0
CONSTIPATION: 0
ABDOMINAL PAIN: 1
CHILLS: 0
VOMITING: 0
NAUSEA: 0
ABDOMINAL PAIN: 0
SHORTNESS OF BREATH: 1

## 2023-11-01 ASSESSMENT — GAIT ASSESSMENTS
GAIT LEVEL OF ASSIST: STANDBY ASSIST
ASSISTIVE DEVICE: OTHER (COMMENTS)
DEVIATION: SHUFFLED GAIT;BRADYKINETIC;INCREASED BASE OF SUPPORT
DISTANCE (FEET): 40

## 2023-11-01 NOTE — CARE PLAN
The patient is Stable - Low risk of patient condition declining or worsening    Problem: Knowledge Deficit - Standard  Goal: Patient and family/care givers will demonstrate understanding of plan of care, disease process/condition, diagnostic tests and medications  Outcome: Progressing     Problem: Pain - Standard  Goal: Alleviation of pain or a reduction in pain to the patient’s comfort goal  Outcome: Progressing     Shift Goals  Clinical Goals: pain control, NG tube managment, rest  Patient Goals: pain control, rest, comfort  Family Goals: family not here    Progress made toward(s) clinical / shift goals:  Patient's pain managed per MAR. NG tube clamped, tolerated, no mentions of nausea during this shift. Respiratory therapy provided treatment throughout this shift. Patient able to rest during this shift.     Patient is not progressing towards the following goals:

## 2023-11-01 NOTE — PROGRESS NOTES
"Bedside report received.  Assessment complete.  A&O x 4. Patient calls appropriately.  Patient ambulates with front wheel walker and one assist. Bed alarm on.   Patient has 7/10 pain. Pain managed with prescribed medications.  Denies N&V. Tolerating full liquid diet.  NGT removed.  + output in [place], + flatus, + BM.  Patient denies SOB.  SCD's refused.  Patient is pleasant and cooperative with the care plan.  Review plan with of care with patient. Call light and personal belongings within reach. Hourly rounding in place. All needs met at this time.   BP (!) 140/83   Pulse 87   Temp 36.5 °C (97.7 °F) (Temporal)   Resp 18   Ht 1.702 m (5' 7\")   Wt (!) 138 kg (303 lb 9.2 oz)   SpO2 92%   BMI 47.55 kg/m²     "

## 2023-11-01 NOTE — CARE PLAN
Problem: Knowledge Deficit - Standard  Goal: Patient and family/care givers will demonstrate understanding of plan of care, disease process/condition, diagnostic tests and medications  Outcome: Progressing     Problem: Pain - Standard  Goal: Alleviation of pain or a reduction in pain to the patient’s comfort goal  Outcome: Progressing     Problem: Skin Integrity  Goal: Skin integrity is maintained or improved  Outcome: Progressing     Problem: Fall Risk  Goal: Patient will remain free from falls  Outcome: Progressing   The patient is Stable - Low risk of patient condition declining or worsening    Shift Goals  Clinical Goals: tolerate po intake  Patient Goals: rest  Family Goals: family not here    Progress made toward(s) clinical / shift goals:  patient tolerating full liquid diet.    Patient is not progressing towards the following goals:

## 2023-11-01 NOTE — PROGRESS NOTES
Bedside report received.  Assessment complete.    A&O x 4. Patient calls appropriately.  Patient ambulates with standby to x1 assist.   Patient has 7/10 pain. Pain managed with prescribed medications.  Denies N&V. Tolerating clear liquid diet.  R nare NG tube, clamped.  + void via [place], + flatus, + BM, last BM 10/31.  Patient denies SOB.  SCD's refused, education provided.    Review plan with of care with patient. Call light and personal belongings within reach. Hourly rounding in place. All needs met at this time.

## 2023-11-01 NOTE — DISCHARGE PLANNING
Case Management Discharge Planning    Admission Date: 10/28/2023  GMLOS: 3.1  ALOS: 4    6-Clicks ADL Score: 21  6-Clicks Mobility Score: 17  PT and/or OT Eval ordered: No  Post-acute Referrals Ordered: No  Post-acute Choice Obtained: No  Has referral(s) been sent to post-acute provider:  No      Anticipated Discharge Dispo: Discharge Disposition: D/T to home under HHA care in anticipation of covered skilled care (06)  Discharge Address: [address] [place] [address]  Discharge Contact Phone Number: [phone]    DME Needed: Pending    Action(s) Taken: Updated Provider/Nurse on Discharge Plan and DC Assessment Complete (See below)    RN RADHA met with Pt at bedside to discuss discharge planning. Pt refused SNF placement and would like to be discharged home with Home health. Pt has oxygen concentrator at home he uses at night with CPAP at 3 LPM. Choice form for Home health obtained and sent to [facility].    1545, Pt informed acceptance to [facility]. IMM provided. Pt said he has transport to home provided by daughter in law.    Escalations Completed: None    Medically Clear: No    Next Steps: CM will continue to assist Pt with discharge needs.      Barriers to Discharge: Medical clearance    Is the patient up for discharge tomorrow: No      Care Transition Team Assessment    RN CM met with Pt at bedside to obtain assessment informations. Demographics verified. RN CM introduced self and purpose of visit.   Pt lives alone in a 1 story apartment on ground level with 1 step to main entrance.  Pt has  daughter and friend for support.  Pt has GUSTAVO MARIN M.D. for PCP  Pt was independent with ADLs prior to hospitalization.  Pt owns quadcane, single point cane and a Front wheel walker. Pt uses oxygen at 3 LPM at night with CPAP.           Information Source  Orientation Level: Oriented X4  Who is responsible for making decisions for patient? : Patient    Readmission Evaluation  Is this a readmission?:  No    Elopement Risk  Legal Hold: No  Ambulatory or Self Mobile in Wheelchair: Yes  Disoriented: No  Psychiatric Symptoms: None  History of Wandering: No  Elopement this Admit: No  Vocalizing Wanting to Leave: No  Displays Behaviors, Body Language Wanting to Leave: No-Not at Risk for Elopement  Elopement Risk: Not at Risk for Elopement    Interdisciplinary Discharge Planning  Primary Care Physician: GUSTAVO MARIN M.D.  Lives with - Patient's Self Care Capacity: Alone and Able to Care For Self  Patient or legal guardian wants to designate a caregiver: No  Support Systems: Children, Friends / Neighbors  Housing / Facility: 1 Story House (Ground level with 1 step to main entrance)  Patient Prefers to be Discharged to:: Home with Home health service  Durable Medical Equipment: Home Oxygen, Walker, Other - Specify (Quadcane and single point cane)    Discharge Preparedness  What is your plan after discharge?: Home with help  What are your discharge supports?: Child  Prior Functional Level: Ambulatory, Independent with Activities of Daily Living, Uses Walker, Uses Cane  Difficulity with ADLs: Walking    Functional Assesment  Prior Functional Level: Ambulatory, Independent with Activities of Daily Living, Uses Walker, Uses Cane    Finances  Financial Barriers to Discharge: No  Prescription Coverage: Yes    Vision / Hearing Impairment  Vision Impairment : No  Right Eye Vision: Impaired, Wears Glasses  Left Eye Vision: Impaired, Wears Glasses  Hearing Impairment : No         Advance Directive  Advance Directive?: None    Domestic Abuse  Have you ever been the victim of abuse or violence?: No  Physical Abuse or Sexual Abuse: No  Verbal Abuse or Emotional Abuse: No  Possible Abuse/Neglect Reported to:: Not Applicable    Psychological Assessment  History of Substance Abuse: Alcohol  History of Psychiatric Problems: No         Anticipated Discharge Information  Discharge Disposition: D/T to home under A care in anticipation  of covered skilled care (06)  Discharge Address: [address] [address]  Discharge Contact Phone Number: [phone]

## 2023-11-01 NOTE — THERAPY
Occupational Therapy Contact Note    Patient Name: Yonny Wallace  Age:  63 y.o., Sex:  male  Medical Record #: [id number]  Today's Date: 11/1/2023    OT orders received and eval attempted. Pt had recently returned to bed after sitting up in the chair majority of the morning. Per RN, pt had been getting up with staff to use the restroom periodically. Will re-attempt OT eval when appropriate/able.

## 2023-11-01 NOTE — PROGRESS NOTES
"    DATE: 11/1/2023    Hospital Day 4  small bowel obstruction    INTERVAL EVENTS:  Denies nausea, + flatus  BM 10/31  Tolerating NG clamping and clears  WBC trend down     REVIEW OF SYSTEMS:  Review of Systems   Constitutional:  Negative for chills and fever.   Respiratory:  Positive for shortness of breath (with exertion, baseline).    Gastrointestinal:  Negative for abdominal pain, nausea and vomiting.   Musculoskeletal:  Positive for back pain.       PHYSICAL EXAMINATION:  Vital Signs: Blood Pressure (Abnormal) 140/83   Pulse 87   Temperature 36.5 °C (97.7 °F) (Temporal)   Respiration 18   Height 1.702 m (5' 7\")   Weight (Abnormal) 138 kg (303 lb 9.2 oz)   Oxygen Saturation 92%   Physical Exam  Vitals and nursing note reviewed.   Constitutional:       General: He is not in acute distress.     Appearance: He is obese. He is not toxic-appearing.      Interventions: Nasal cannula in place.   HENT:      Head: Normocephalic.      Nose:      Comments: Nasogastric tube in place, clamped     Mouth/Throat:      Mouth: Mucous membranes are moist.      Pharynx: Oropharynx is clear.   Eyes:      Conjunctiva/sclera: Conjunctivae normal.   Cardiovascular:      Rate and Rhythm: Normal rate and regular rhythm.      Pulses: Normal pulses.   Pulmonary:      Effort: Pulmonary effort is normal.   Abdominal:      General: There is no distension.      Palpations: Abdomen is soft.      Tenderness: There is no abdominal tenderness.      Hernia: A hernia is present.   Genitourinary:     Comments: Delacruz in place with yellow urine  Skin:     General: Skin is warm and dry.   Neurological:      Mental Status: He is alert.         LABORATORY VALUES:   Recent Labs     10/30/23  0216 10/31/23  0654 11/01/23  0355   WBC 9.0 16.7* 15.2*   RBC 4.30* 4.52* 4.54*   HEMOGLOBIN 14.0 14.3 14.7   HEMATOCRIT 42.8 45.3 44.3   MCV 99.5* 100.2* 97.6   MCH 32.6 31.6 32.4   MCHC 32.7 31.6* 33.2   RDW 50.5* 50.4* 47.4   PLATELETCT 290 287 280   MPV 10.5 " 10.5 10.6     Recent Labs     10/30/23  0216 10/31/23  0654 11/01/23  0355   SODIUM 142 143 139   POTASSIUM 3.9 4.1 3.6   CHLORIDE 105 107 102   CO2 29 23 25   GLUCOSE 140* 142* 151*   BUN 24* 18 21   CREATININE 1.00 0.84 0.86   CALCIUM 8.6 8.9 8.9     Recent Labs     10/31/23  0654   ASTSGOT 12   ALTSGPT 12   TBILIRUBIN 1.1   ALKPHOSPHAT 49   GLOBULIN 3.4            IMAGING:   DX-CHEST-PORTABLE (1 VIEW)   Final Result      1.  Cardiomegaly.   2.  Bilateral interstitial edema and probable small pleural effusions.      DX-ABDOMEN FOR TUBE PLACEMENT   Final Result         Gastric drainage tube with tip projecting over the expected area of the stomach. Sidehole at GE junction.      [id number] VIEW   Final Result      Stable findings of small bowel instruction. Consider advancing the nasogastric tube further into the stomach.      OUTSIDE IMAGES-CT ABDOMEN /PELVIS   Final Result      OUTSIDE IMAGES-DX CHEST   Final Result          Core Measures & Quality Metrics    ASSESSMENT AND PLAN:   * SBO (small bowel obstruction) (HCC)- (present on admission)  Assessment & Plan  Mult abdominal surgeries  Last Ex lap BARBARA 2/23  Now with recurrent SBO  NGT   Non op management  10/31 + BM, clamp NG tube and start sips of clears  11/1 Tolerating clears, remove NG and advance diet        Discussed patient condition with RN, Patient, and general surgery, Dr. Donnelly.

## 2023-11-01 NOTE — PROGRESS NOTES
Hospital Medicine Daily Progress Note    Date of Service  11/1/2023    Chief Complaint  Yonny Wallace is a 63 y.o. male admitted 10/28/2023 with abd pain    Hospital Course  62 yo man with A-fib, CHF, HTN, HLD who has had multiple prior abdominal surgeries and SBO who presented with abdominal pain and had CTAP that showed an SBO.  Surgery was consulted.  He was decompressed with NG tube.    Interval Problem Update  10/31 - NG tube 50 cc  New leukocytosis 16.7  He feels short of breath since last night.  Hypertensive up to SBP 190s.  He had a BM today.  Discussed with surgery and we will clamp his tube and restart his oral home medications.  His BNP is very high.  Chest x-ray shows pulmonary edema.  I ordered for IV Lasix diuresis    11/1 -in A-fib, rate controlled 80s.  Weaned to 4 L O2.  Hypertension much improved, SBP 140s.  His SOB is doing a lot better. Urine output 2900 cc, continue IV Lasix diuresis.  Leukocytosis 15.2, slightly decreased.  Hypo-Phos, replete. Surgery advancing his diet and removed his NGT. He wants to go home with home health, ordered    I have discussed this patient's plan of care and discharge plan at IDT rounds today with Case Management, Nursing, Nursing leadership, and other members of the IDT team.    Consultants/Specialty  general surgery    Code Status  Full Code    Disposition  The patient is not medically cleared for discharge to home or a post-acute facility.  Anticipate discharge to: home with organized home healthcare and close outpatient follow-up    I have placed the appropriate orders for post-discharge needs.    Review of Systems  Review of Systems   Constitutional:  Positive for malaise/fatigue.   Respiratory:  Positive for shortness of breath (improved).    Cardiovascular:  Negative for chest pain.   Gastrointestinal:  Positive for abdominal pain. Negative for constipation and nausea.   Neurological:  Positive for weakness.        Physical Exam  Temp:  [36.2 °C (97.2  °F)-36.9 °C (98.4 °F)] 36.4 °C (97.5 °F)  Pulse:  [] 81  Resp:  [18-21] 18  BP: (140-173)/() 145/82  SpO2:  [90 %-95 %] 93 %    Physical Exam  Vitals and nursing note reviewed.   Constitutional:       General: He is not in acute distress.     Appearance: He is not toxic-appearing.   HENT:      Head: Normocephalic.      Mouth/Throat:      Mouth: Mucous membranes are moist.   Eyes:      General:         Right eye: No discharge.         Left eye: No discharge.   Cardiovascular:      Rate and Rhythm: Normal rate. Rhythm irregular.   Pulmonary:      Breath sounds: No wheezing or rales.      Comments: Tachypneic, poor air movement  Abdominal:      General: There is distension.      Palpations: Abdomen is soft.      Tenderness: There is no abdominal tenderness. There is no guarding or rebound.      Comments: Large right-sided hernia   Musculoskeletal:      Cervical back: Neck supple.      Right lower leg: Edema present.      Left lower leg: Edema present.   Skin:     General: Skin is warm and dry.   Neurological:      Mental Status: He is alert and oriented to person, place, and time.         Fluids    Intake/Output Summary (Last 24 hours) at 11/1/2023 1307  Last data filed at 11/1/2023 0759  Gross per 24 hour   Intake 180 ml   Output 3000 ml   Net -2820 ml       Laboratory  Recent Labs     10/30/23  0216 10/31/23  0654 11/01/23  0355   WBC 9.0 16.7* 15.2*   RBC 4.30* 4.52* 4.54*   HEMOGLOBIN 14.0 14.3 14.7   HEMATOCRIT 42.8 45.3 44.3   MCV 99.5* 100.2* 97.6   MCH 32.6 31.6 32.4   MCHC 32.7 31.6* 33.2   RDW 50.5* 50.4* 47.4   PLATELETCT 290 287 280   MPV 10.5 10.5 10.6     Recent Labs     10/30/23  0216 10/31/23  0654 11/01/23  0355   SODIUM 142 143 139   POTASSIUM 3.9 4.1 3.6   CHLORIDE 105 107 102   CO2 29 23 25   GLUCOSE 140* 142* 151*   BUN 24* 18 21   CREATININE 1.00 0.84 0.86   CALCIUM 8.6 8.9 8.9                     Imaging  DX-CHEST-PORTABLE (1 VIEW)   Final Result      1.  Cardiomegaly.   2.  Bilateral  interstitial edema and probable small pleural effusions.      DX-ABDOMEN FOR TUBE PLACEMENT   Final Result         Gastric drainage tube with tip projecting over the expected area of the stomach. Sidehole at GE junction.      [id number] VIEW   Final Result      Stable findings of small bowel instruction. Consider advancing the nasogastric tube further into the stomach.      OUTSIDE IMAGES-CT ABDOMEN /PELVIS   Final Result      OUTSIDE IMAGES-DX CHEST   Final Result           Assessment/Plan  * SBO (small bowel obstruction) (HCC)- (present on admission)  Assessment & Plan  CT scan at outside facility shows SBO, has a history of SBO with admission for this at this facility earlier this year.  Has had many abdominal surgeries in the past  Surgery team consulted and following  Full liquid diet and NG tube removed today    Acute respiratory failure with hypoxia (HCC)- (present on admission)  Assessment & Plan  Chest x-ray showed pulmonary edema and small pleural effusions  Continue IV diuresis and weaning down oxygen  Leukocytosis of 15, however procalcitonin was low.  May be reactive.  Trend CBC for now    SUHAS (acute kidney injury) ([facility])- (present on admission)  Assessment & Plan  His initial SUHAS had resolved  Ordered for IV Lasix, monitor renal function  Renal function so far stable    Chronic systolic congestive heart failure (HCC)- (present on admission)  Assessment & Plan  10/30/2023    Recovered EF, EF from earlier this year was 55%  He is now hypertensive with pulmonary edema.  Ordered for IV Lasix twice daily, monitor urine output.  Restarted Norvasc, metoprolol, Entresto.  IV labetalol and hydralazine as needed for blood pressure control      Mixed hyperlipidemia- (present on admission)  Assessment & Plan  Continue home pravastatin    Atrial fibrillation with RVR (HCC)- (present on admission)  Assessment & Plan  In A-fib, rate controlled   telemetry  Restarted home metoprolol  Holding Pradaxa until certain  there will be no surgery.  On therapeutic Lovenox for now    HEATHER (obstructive sleep apnea)- (present on admission)  Assessment & Plan  CPAP ordered    Primary hypertension- (present on admission)  Assessment & Plan  Hypertensive urgency with pulmonary edema improving.  Ordered for IV Lasix twice daily, monitor urine output.  Restarted Norvasc, metoprolol, Entresto.  IV labetalol and hydralazine as needed for blood pressure control         VTE prophylaxis:    therapeutic anticoagulation with weight-based lovenox BID, 1 mg/kg      I have performed a physical exam and reviewed and updated ROS and Plan today (11/1/2023). In review of yesterday's note (10/31/2023), there are no changes except as documented above.

## 2023-11-01 NOTE — THERAPY
Physical Therapy   Initial Evaluation     Patient Name: Yonny Wallace  Age:  63 y.o., Sex:  male  Medical Record #: [id number]  Today's Date: 11/1/2023     Precautions  Precautions: Fall Risk    Assessment  Patient is 63 y.o. male admitted with abdominal pain. PMH includes Afib, CHF, HTN, HLD, multiple prior admissions/surgeries. Pt found to have SBO and was decompressed with NG tube which has since been removed. Pt requested to hold onto IV pole during ambulating and limited by general fatigue. PT will cont while pt is in acute care setting to address deficits.     Plan    Physical Therapy Initial Treatment Plan   Treatment Plan : Gait Training, Neuro Re-Education / Balance, Self Care / Home Evaluation, Therapeutic Activities, Therapeutic Exercise  Treatment Frequency: 3 Times per Week  Duration: Until Therapy Goals Met    DC Equipment Recommendations: None  Discharge Recommendations: Recommend home health for continued physical therapy services          11/01/23 1005   Pain 0 - 10 Group   Therapist Pain Assessment During Activity;Nurse Notified  (general pain not rated)   Non Verbal Descriptors   Non Verbal Scale  Calm   Prior Living Situation   Prior Services Home-Independent   Housing / Facility 1 Story House   Steps Into Home 0   Steps In Home 0   Equipment Owned Front-Wheel Walker;Single Point Cane   Lives with - Patient's Self Care Capacity Alone and Able to Care For Self   Comments lives alone, independnet and working   Prior Level of Functional Mobility   Bed Mobility Independent   Transfer Status Independent   Ambulation Independent   Ambulation Distance community   Assistive Devices Used None   Comments working on a [place] course   Cognition    Level of Consciousness Alert   Comments receptive   Active ROM Lower Body    Active ROM Lower Body  WDL   Strength Lower Body   Lower Body Strength  WDL   Sensation Lower Body   Lower Extremity Sensation   WDL   Other Treatments   Other Treatments Provided encouragement  provided for increased mobility   Balance Assessment   Sitting Balance (Static) Fair +   Sitting Balance (Dynamic) Fair +   Standing Balance (Static) Fair   Standing Balance (Dynamic) Fair   Weight Shift Sitting Good   Weight Shift Standing Fair   Comments IV pole for support   Bed Mobility    Supine to Sit Standby Assist   Scooting Supervised   Comments HOB slightly raised   Gait Analysis   Gait Level Of Assist Standby Assist   Assistive Device Other (Comments)  (IV pole)   Distance (Feet) 40   # of Times Distance was Traveled 1   Deviation Shuffled Gait;Bradykinetic;Increased Base Of Support   Weight Bearing Status no restrictions   Comments initially unsteady, improved slightly with distance   Functional Mobility   Sit to Stand Standby Assist   Transfer Method Stand Step   Mobility in room and hallway with IV pole   Edema / Skin Assessment   Edema / Skin  Not Assessed   Patient / Family Goals    Patient / Family Goal #1 none stated   Short Term Goals    Short Term Goal # 1 pt will be able to complete functional transfers with no AD and SPV in 6tx in order to dc home   Short Term Goal # 2 pt will be able to amublate 100ft with no AD and SPV in 6tx in order to dc home   Education Group   Education Provided Role of Physical Therapist   Role of Physical Therapist Patient Response Patient;Acceptance;Demonstration;Action Demonstration   Anticipated Discharge Equipment and Recommendations   DC Equipment Recommendations None   Discharge Recommendations Recommend home health for continued physical therapy services

## 2023-11-02 LAB
ALBUMIN SERPL BCP-MCNC: 3.4 G/DL (ref 3.2–4.9)
BASOPHILS # BLD AUTO: 0.5 % (ref 0–1.8)
BASOPHILS # BLD: 0.06 K/UL (ref 0–0.12)
BUN SERPL-MCNC: 20 MG/DL (ref 8–22)
CALCIUM ALBUM COR SERPL-MCNC: 9.7 MG/DL (ref 8.5–10.5)
CALCIUM SERPL-MCNC: 9.2 MG/DL (ref 8.5–10.5)
CHLORIDE SERPL-SCNC: 98 MMOL/L (ref 96–112)
CO2 SERPL-SCNC: 29 MMOL/L (ref 20–33)
CREAT SERPL-MCNC: 0.84 MG/DL (ref 0.5–1.4)
EOSINOPHIL # BLD AUTO: 0.26 K/UL (ref 0–0.51)
EOSINOPHIL NFR BLD: 2.3 % (ref 0–6.9)
ERYTHROCYTE [DISTWIDTH] IN BLOOD BY AUTOMATED COUNT: 46.3 FL (ref 35.9–50)
GFR SERPLBLD CREATININE-BSD FMLA CKD-EPI: 96 ML/MIN/1.73 M 2
GLUCOSE BLD STRIP.AUTO-MCNC: 119 MG/DL (ref 65–99)
GLUCOSE BLD STRIP.AUTO-MCNC: 148 MG/DL (ref 65–99)
GLUCOSE BLD STRIP.AUTO-MCNC: 161 MG/DL (ref 65–99)
GLUCOSE SERPL-MCNC: 150 MG/DL (ref 65–99)
HCT VFR BLD AUTO: 44.4 % (ref 42–52)
HGB BLD-MCNC: 14.6 G/DL (ref 14–18)
IMM GRANULOCYTES # BLD AUTO: 0.06 K/UL (ref 0–0.11)
IMM GRANULOCYTES NFR BLD AUTO: 0.5 % (ref 0–0.9)
LYMPHOCYTES # BLD AUTO: 1.7 K/UL (ref 1–4.8)
LYMPHOCYTES NFR BLD: 15 % (ref 22–41)
MAGNESIUM SERPL-MCNC: 2.1 MG/DL (ref 1.5–2.5)
MCH RBC QN AUTO: 32 PG (ref 27–33)
MCHC RBC AUTO-ENTMCNC: 32.9 G/DL (ref 32.3–36.5)
MCV RBC AUTO: 97.4 FL (ref 81.4–97.8)
MONOCYTES # BLD AUTO: 1.59 K/UL (ref 0–0.85)
MONOCYTES NFR BLD AUTO: 14 % (ref 0–13.4)
NEUTROPHILS # BLD AUTO: 7.7 K/UL (ref 1.82–7.42)
NEUTROPHILS NFR BLD: 67.7 % (ref 44–72)
NRBC # BLD AUTO: 0.04 K/UL
NRBC BLD-RTO: 0.4 /100 WBC (ref 0–0.2)
NT-PROBNP SERPL IA-MCNC: 1100 PG/ML (ref 0–125)
PHOSPHATE SERPL-MCNC: 2.8 MG/DL (ref 2.5–4.5)
PLATELET # BLD AUTO: 292 K/UL (ref 164–446)
PMV BLD AUTO: 11.3 FL (ref 9–12.9)
POTASSIUM SERPL-SCNC: 3.4 MMOL/L (ref 3.6–5.5)
RBC # BLD AUTO: 4.56 M/UL (ref 4.7–6.1)
SODIUM SERPL-SCNC: 138 MMOL/L (ref 135–145)
WBC # BLD AUTO: 11.4 K/UL (ref 4.8–10.8)

## 2023-11-02 PROCEDURE — 700102 HCHG RX REV CODE 250 W/ 637 OVERRIDE(OP): Mod: JZ | Performed by: INTERNAL MEDICINE

## 2023-11-02 PROCEDURE — 94618 PULMONARY STRESS TESTING: CPT

## 2023-11-02 PROCEDURE — 97165 OT EVAL LOW COMPLEX 30 MIN: CPT

## 2023-11-02 PROCEDURE — 83735 ASSAY OF MAGNESIUM: CPT

## 2023-11-02 PROCEDURE — 99232 SBSQ HOSP IP/OBS MODERATE 35: CPT | Performed by: INTERNAL MEDICINE

## 2023-11-02 PROCEDURE — 770020 HCHG ROOM/CARE - TELE (206)

## 2023-11-02 PROCEDURE — 94760 N-INVAS EAR/PLS OXIMETRY 1: CPT

## 2023-11-02 PROCEDURE — 36415 COLL VENOUS BLD VENIPUNCTURE: CPT

## 2023-11-02 PROCEDURE — 80069 RENAL FUNCTION PANEL: CPT

## 2023-11-02 PROCEDURE — A9270 NON-COVERED ITEM OR SERVICE: HCPCS | Performed by: STUDENT IN AN ORGANIZED HEALTH CARE EDUCATION/TRAINING PROGRAM

## 2023-11-02 PROCEDURE — 700111 HCHG RX REV CODE 636 W/ 250 OVERRIDE (IP): Mod: JZ | Performed by: INTERNAL MEDICINE

## 2023-11-02 PROCEDURE — 85025 COMPLETE CBC W/AUTO DIFF WBC: CPT

## 2023-11-02 PROCEDURE — A9270 NON-COVERED ITEM OR SERVICE: HCPCS | Mod: JZ | Performed by: INTERNAL MEDICINE

## 2023-11-02 PROCEDURE — 97116 GAIT TRAINING THERAPY: CPT

## 2023-11-02 PROCEDURE — 700102 HCHG RX REV CODE 250 W/ 637 OVERRIDE(OP): Performed by: STUDENT IN AN ORGANIZED HEALTH CARE EDUCATION/TRAINING PROGRAM

## 2023-11-02 PROCEDURE — 83880 ASSAY OF NATRIURETIC PEPTIDE: CPT

## 2023-11-02 PROCEDURE — 82962 GLUCOSE BLOOD TEST: CPT

## 2023-11-02 PROCEDURE — 99231 SBSQ HOSP IP/OBS SF/LOW 25: CPT | Performed by: NURSE PRACTITIONER

## 2023-11-02 PROCEDURE — 97530 THERAPEUTIC ACTIVITIES: CPT

## 2023-11-02 PROCEDURE — 94660 CPAP INITIATION&MGMT: CPT

## 2023-11-02 PROCEDURE — 700111 HCHG RX REV CODE 636 W/ 250 OVERRIDE (IP): Performed by: INTERNAL MEDICINE

## 2023-11-02 PROCEDURE — 700111 HCHG RX REV CODE 636 W/ 250 OVERRIDE (IP): Performed by: STUDENT IN AN ORGANIZED HEALTH CARE EDUCATION/TRAINING PROGRAM

## 2023-11-02 RX ORDER — POTASSIUM CHLORIDE 20 MEQ/1
40 TABLET, EXTENDED RELEASE ORAL 2 TIMES DAILY
Status: COMPLETED | OUTPATIENT
Start: 2023-11-02 | End: 2023-11-02

## 2023-11-02 RX ORDER — DABIGATRAN ETEXILATE 150 MG/1
150 CAPSULE ORAL 2 TIMES DAILY
Status: DISCONTINUED | OUTPATIENT
Start: 2023-11-02 | End: 2023-11-03 | Stop reason: HOSPADM

## 2023-11-02 RX ADMIN — POTASSIUM CHLORIDE 40 MEQ: 1500 TABLET, EXTENDED RELEASE ORAL at 18:09

## 2023-11-02 RX ADMIN — TERAZOSIN HYDROCHLORIDE 2 MG: 2 CAPSULE ORAL at 18:09

## 2023-11-02 RX ADMIN — DABIGATRAN ETEXILATE MESYLATE 150 MG: 150 CAPSULE ORAL at 18:40

## 2023-11-02 RX ADMIN — SACUBITRIL AND VALSARTAN 1 TABLET: 24; 26 TABLET, FILM COATED ORAL at 18:09

## 2023-11-02 RX ADMIN — SACUBITRIL AND VALSARTAN 1 TABLET: 24; 26 TABLET, FILM COATED ORAL at 05:43

## 2023-11-02 RX ADMIN — OXYCODONE 10 MG: 5 TABLET ORAL at 16:18

## 2023-11-02 RX ADMIN — OXYCODONE 10 MG: 5 TABLET ORAL at 05:43

## 2023-11-02 RX ADMIN — INSULIN HUMAN 1 UNITS: 100 INJECTION, SOLUTION PARENTERAL at 12:08

## 2023-11-02 RX ADMIN — PROCHLORPERAZINE EDISYLATE 10 MG: 5 INJECTION INTRAMUSCULAR; INTRAVENOUS at 18:16

## 2023-11-02 RX ADMIN — HYDROMORPHONE HYDROCHLORIDE 0.5 MG: 1 INJECTION, SOLUTION INTRAMUSCULAR; INTRAVENOUS; SUBCUTANEOUS at 12:07

## 2023-11-02 RX ADMIN — HYDROMORPHONE HYDROCHLORIDE 0.5 MG: 1 INJECTION, SOLUTION INTRAMUSCULAR; INTRAVENOUS; SUBCUTANEOUS at 18:16

## 2023-11-02 RX ADMIN — OXYCODONE 5 MG: 5 TABLET ORAL at 22:03

## 2023-11-02 RX ADMIN — HYDROMORPHONE HYDROCHLORIDE 0.5 MG: 1 INJECTION, SOLUTION INTRAMUSCULAR; INTRAVENOUS; SUBCUTANEOUS at 07:58

## 2023-11-02 RX ADMIN — FUROSEMIDE 40 MG: 10 INJECTION, SOLUTION INTRAMUSCULAR; INTRAVENOUS at 16:18

## 2023-11-02 RX ADMIN — PRAVASTATIN SODIUM 40 MG: 20 TABLET ORAL at 18:09

## 2023-11-02 RX ADMIN — ENOXAPARIN SODIUM 150 MG: 150 INJECTION SUBCUTANEOUS at 05:43

## 2023-11-02 RX ADMIN — ROPINIROLE HYDROCHLORIDE 3 MG: 2 TABLET, FILM COATED ORAL at 22:03

## 2023-11-02 RX ADMIN — FUROSEMIDE 40 MG: 10 INJECTION, SOLUTION INTRAMUSCULAR; INTRAVENOUS at 05:44

## 2023-11-02 RX ADMIN — AMLODIPINE BESYLATE 5 MG: 10 TABLET ORAL at 05:43

## 2023-11-02 RX ADMIN — OXYCODONE 10 MG: 5 TABLET ORAL at 10:29

## 2023-11-02 RX ADMIN — POTASSIUM CHLORIDE 40 MEQ: 1500 TABLET, EXTENDED RELEASE ORAL at 07:56

## 2023-11-02 RX ADMIN — METOPROLOL SUCCINATE 100 MG: 100 TABLET, EXTENDED RELEASE ORAL at 18:09

## 2023-11-02 RX ADMIN — FLUOXETINE 60 MG: 20 CAPSULE ORAL at 05:43

## 2023-11-02 ASSESSMENT — ENCOUNTER SYMPTOMS
ABDOMINAL PAIN: 0
ABDOMINAL PAIN: 1
SHORTNESS OF BREATH: 0
CHILLS: 0
FEVER: 0
VOMITING: 0
SHORTNESS OF BREATH: 1
WEAKNESS: 1
CONSTIPATION: 0
NAUSEA: 0

## 2023-11-02 ASSESSMENT — COGNITIVE AND FUNCTIONAL STATUS - GENERAL
SUGGESTED CMS G CODE MODIFIER DAILY ACTIVITY: CI
SUGGESTED CMS G CODE MODIFIER MOBILITY: CJ
CLIMB 3 TO 5 STEPS WITH RAILING: A LITTLE
DAILY ACTIVITIY SCORE: 23
MOVING FROM LYING ON BACK TO SITTING ON SIDE OF FLAT BED: A LITTLE
MOBILITY SCORE: 20
TURNING FROM BACK TO SIDE WHILE IN FLAT BAD: A LITTLE
TOILETING: A LITTLE
MOVING TO AND FROM BED TO CHAIR: A LITTLE

## 2023-11-02 ASSESSMENT — PAIN DESCRIPTION - PAIN TYPE
TYPE: ACUTE PAIN

## 2023-11-02 ASSESSMENT — GAIT ASSESSMENTS
DEVIATION: INCREASED BASE OF SUPPORT;SHUFFLED GAIT
GAIT LEVEL OF ASSIST: SUPERVISED
DISTANCE (FEET): 300

## 2023-11-02 ASSESSMENT — CHA2DS2 SCORE
AGE 65 TO 74: YES
AGE 75 OR GREATER: NO
HYPERTENSION: YES
CHA2DS2 VASC SCORE: 6
DIABETES: YES
SEX: MALE
PRIOR STROKE OR TIA OR THROMBOEMBOLISM: YES
CHF OR LEFT VENTRICULAR DYSFUNCTION: YES
VASCULAR DISEASE: NO

## 2023-11-02 ASSESSMENT — ACTIVITIES OF DAILY LIVING (ADL): TOILETING: INDEPENDENT

## 2023-11-02 NOTE — PROGRESS NOTES
Hospital Medicine Daily Progress Note    Date of Service  11/2/2023    Chief Complaint  Yonny Wallace is a 63 y.o. male admitted 10/28/2023 with abd pain    Hospital Course  64 yo man with A-fib, CHF, HTN, HLD who has had multiple prior abdominal surgeries and SBO who presented with abdominal pain and had CTAP that showed an SBO.  Surgery was consulted.  He was decompressed with NG tube.    Interval Problem Update  10/31 - NG tube 50 cc  New leukocytosis 16.7  He feels short of breath since last night.  Hypertensive up to SBP 190s.  He had a BM today.  Discussed with surgery and we will clamp his tube and restart his oral home medications.  His BNP is very high.  Chest x-ray shows pulmonary edema.  I ordered for IV Lasix diuresis    11/1 -in A-fib, rate controlled 80s.  Weaned to 4 L O2.  Hypertension much improved, SBP 140s.  His SOB is doing a lot better. Urine output 2900 cc, continue IV Lasix diuresis.  Leukocytosis 15.2, slightly decreased.  Hypo-Phos, replete. Surgery advancing his diet and removed his NGT. He wants to go home with home health, ordered    11/2 -A-fib, 80-110s.  Remains on 3 L O2.  Urine output 195 0 cc.  Leukocytosis improved to 11.4.  Hypo-K 3.4, replete.  He desatted to 86% with ambulation, continue IV Lasix diuresis.  Remove Delacruz cath today.  Surgery has advanced his diet, he is still having BMs.    I have discussed this patient's plan of care and discharge plan at IDT rounds today with Case Management, Nursing, Nursing leadership, and other members of the IDT team.    Consultants/Specialty  general surgery    Code Status  Full Code    Disposition  The patient is not medically cleared for discharge to home or a post-acute facility.  Anticipate discharge to: home with organized home healthcare and close outpatient follow-up    I have placed the appropriate orders for post-discharge needs.    Review of Systems  Review of Systems   Constitutional:  Positive for malaise/fatigue.   Respiratory:   Negative for shortness of breath.    Cardiovascular:  Negative for chest pain.   Gastrointestinal:  Positive for abdominal pain. Negative for constipation and nausea.   Neurological:  Positive for weakness.        Physical Exam  Temp:  [36.4 °C (97.5 °F)-36.8 °C (98.2 °F)] 36.4 °C (97.5 °F)  Pulse:  [] 93  Resp:  [18-19] 19  BP: (122-146)/(68-93) 135/80  SpO2:  [91 %-96 %] 94 %    Physical Exam  Vitals and nursing note reviewed.   Constitutional:       General: He is not in acute distress.     Appearance: He is not toxic-appearing.   HENT:      Head: Normocephalic.      Mouth/Throat:      Mouth: Mucous membranes are moist.   Eyes:      General:         Right eye: No discharge.         Left eye: No discharge.   Cardiovascular:      Rate and Rhythm: Normal rate. Rhythm irregular.   Pulmonary:      Effort: No respiratory distress.      Breath sounds: No wheezing or rales.   Abdominal:      General: There is distension.      Palpations: Abdomen is soft.      Tenderness: There is no abdominal tenderness. There is no guarding or rebound.      Comments: Large right-sided hernia   Musculoskeletal:      Cervical back: Neck supple.      Right lower leg: Edema present.      Left lower leg: Edema present.   Skin:     General: Skin is warm and dry.   Neurological:      Mental Status: He is alert and oriented to person, place, and time.         Fluids    Intake/Output Summary (Last 24 hours) at 11/2/2023 1236  Last data filed at 11/2/2023 0809  Gross per 24 hour   Intake 480 ml   Output 2475 ml   Net -1995 ml       Laboratory  Recent Labs     10/31/23  0654 11/01/23  0355 11/02/23  0429   WBC 16.7* 15.2* 11.4*   RBC 4.52* 4.54* 4.56*   HEMOGLOBIN 14.3 14.7 14.6   HEMATOCRIT 45.3 44.3 44.4   .2* 97.6 97.4   MCH 31.6 32.4 32.0   MCHC 31.6* 33.2 32.9   RDW 50.4* 47.4 46.3   PLATELETCT 287 280 292   MPV 10.5 10.6 11.3     Recent Labs     11/01/23  0355 11/01/23  1629 11/02/23  0429   SODIUM 139 137 138   POTASSIUM 3.6  3.7 3.4*   CHLORIDE 102 98 98   CO2 25 27 29   GLUCOSE 151* 127* 150*   BUN 21 21 20   CREATININE 0.86 0.79 0.84   CALCIUM 8.9 9.3 9.2                     Imaging  DX-CHEST-PORTABLE (1 VIEW)   Final Result      1.  Cardiomegaly.   2.  Bilateral interstitial edema and probable small pleural effusions.      DX-ABDOMEN FOR TUBE PLACEMENT   Final Result         Gastric drainage tube with tip projecting over the expected area of the stomach. Sidehole at GE junction.      [id number] VIEW   Final Result      Stable findings of small bowel instruction. Consider advancing the nasogastric tube further into the stomach.      OUTSIDE IMAGES-CT ABDOMEN /PELVIS   Final Result      OUTSIDE IMAGES-DX CHEST   Final Result           Assessment/Plan  * SBO (small bowel obstruction) (HCC)- (present on admission)  Assessment & Plan  CT scan at outside facility shows SBO, has a history of SBO with admission for this at this facility earlier this year.  Has had many abdominal surgeries in the past  Surgery team consulted   Symptoms have improved and advancing diet    Acute respiratory failure with hypoxia (HCC)- (present on admission)  Assessment & Plan  Chest x-ray showed pulmonary edema and small pleural effusions due to hypertensive urgency  Continue IV diuresis and weaning down oxygen  Oxygenation improving  Leukocytosis of 15, however procalcitonin was low.  May be reactive.  WBC has down trended    SUHAS (acute kidney injury) (HCC)- (present on admission)  Assessment & Plan  His initial SUHAS had resolved  Ordered for IV Lasix, monitor renal function  Renal function so far stable    Chronic systolic congestive heart failure (HCC)- (present on admission)  Assessment & Plan  10/30/2023    Recovered EF, EF from earlier this year was 55%  He is now hypertensive with pulmonary edema.  Ordered for IV Lasix twice daily, monitor urine output.  Restarted Norvasc, metoprolol, Entresto.  IV labetalol and hydralazine as needed for blood pressure  control      Mixed hyperlipidemia- (present on admission)  Assessment & Plan  Continue home pravastatin    Atrial fibrillation with RVR (HCC)- (present on admission)  Assessment & Plan  In A-fib, rate controlled   telemetry  Continue home metoprolol and Pradaxa  Has intermittent NSVT.  Recent echo this year was unremarkable.  Monitoring electrolytes and repleting as indicated.  I discussed with the patient and he will follow-up with his cardiologist in [place] to consider Holter monitor.    HEATHER (obstructive sleep apnea)- (present on admission)  Assessment & Plan  CPAP ordered    Primary hypertension- (present on admission)  Assessment & Plan  Hypertensive urgency with pulmonary edema improving.  Ordered for IV Lasix twice daily, monitor urine output.  Restarted Norvasc, metoprolol, Entresto.  IV labetalol and hydralazine as needed for blood pressure control         VTE prophylaxis:    therapeutic anticoagulation with pradaxa 150 mg BID      I have performed a physical exam and reviewed and updated ROS and Plan today (11/2/2023). In review of yesterday's note (11/1/2023), there are no changes except as documented above.

## 2023-11-02 NOTE — THERAPY
"Occupational Therapy   Initial Evaluation     Patient Name: Yonny Wallace  Age:  63 y.o., Sex:  male  Medical Record #: [id number]  Today's Date: 11/2/2023    Precautions: Fall Risk    Assessment  Patient is 63 y.o. male admitted for SBO. PMhx: Afib, HTN, dyslipidemia, prediabetes, CHF, obesity and multiple abdominal sx including: splenectomy, appendectomy, colectomy, lysis adhesion, multiple hernia repairs. Pt is normally independent w/ADL's and mobility and no AD.   This admission pt is dx w/SOB managed via NG which is now removed. Pt appears to be at/near his baseline. No further acute OT needs indicated.     Plan  DC Equipment Recommendations: None  Discharge Recommendations: Anticipate that the patient will have no further occupational therapy needs after discharge from the hospital     Subjective  \"I feel fine about going home\"      Objective     11/02/23 1529   Charge Group   OT Evaluation OT Evaluation Low   Total Time Spent   OT Time Spent Yes   OT Evaluation (Minutes) 17   OT Total Time Spent (Calculated) 17   Initial Contact Note    Initial Contact Note Order Received and Verified, Evaluation Only - Patient Does Not Require Further Acute Occupational Therapy at this Time.  However, May Benefit from Post Acute Therapy for Higher Level Functional Deficits.   Prior Living Situation   Prior Services Home-Independent   Housing / Facility 1 Story House   Steps Into Home 0   Steps In Home 0   Bathroom Set up Walk In Shower   Equipment Owned Front-Wheel Walker;Single Point Cane   Lives with - Patient's Self Care Capacity Alone and Able to Care For Self   Comments lives alone, independnet and working   Prior Level of ADL Function   Self Feeding Independent   Grooming / Hygiene Independent   Bathing Independent   Dressing Independent   Toileting Independent   Prior Level of IADL Function   Medication Management Independent   Laundry Independent   Kitchen Mobility Independent   Finances Independent   Home Management " Independent   Shopping Independent   Prior Level Of Mobility Independent Without Device in Community   Precautions   Precautions Fall Risk   Pain 0 - 10 Group   Location Back   Location Orientation Lower;Right;Left;Mid   Therapist Pain Assessment During Activity;Nurse Notified   Cognition    Cognition / Consciousness WDL   Level of Consciousness Alert   Passive ROM Upper Body   Passive ROM Upper Body WDL   Active ROM Upper Body   Active ROM Upper Body  WDL   Strength Upper Body   Upper Body Strength  WDL   Sensation Upper Body   Upper Extremity Sensation  WDL   Coordination Upper Body   Coordination WDL   Balance Assessment   Sitting Balance (Static) Fair +   Sitting Balance (Dynamic) Fair +   Standing Balance (Static) Fair   Standing Balance (Dynamic) Fair   Weight Shift Sitting Good   Weight Shift Standing Fair   Comments no AD no LOB   Bed Mobility    Supine to Sit Supervised   ADL Assessment   Grooming Supervision;Standing   Upper Body Dressing Supervision   Lower Body Dressing Supervision   Toileting Contact Guard Assist   Comments pt did require assist for stef care d/t body habitus reviewed adaptive strateiges such as tongs w/wipes   How much help from another person does the patient currently need...   6 Clicks Daily Activity Score 23   Functional Mobility   Sit to Stand Supervised   Bed, Chair, Wheelchair Transfer Supervised   Toilet Transfers Supervised   Mobility walking in room no AD   Edema / Skin Assessment   Edema / Skin  Not Assessed   Activity Tolerance   Comments no overt c/o pain or fatigue   Patient / Family Goals   Patient / Family Goal #1 to go home   Education Group   Role of Occupational Therapist Patient Response Patient;Acceptance;Explanation;Demonstration   Problem List   Problem List None   Anticipated Discharge Equipment and Recommendations   DC Equipment Recommendations None   Discharge Recommendations Anticipate that the patient will have no further occupational therapy needs after  discharge from the hospital   Interdisciplinary Plan of Care Collaboration   IDT Collaboration with  Nursing   Patient Position at End of Therapy Other (Comments)  (up w/PT)   Collaboration Comments RN aware of session   Session Information   Date / Session Number  11/2 #1 eval only

## 2023-11-02 NOTE — DOCUMENTATION QUERY
[facility]                                                                       Query Response Note      PATIENT:               SANTOSH RODARTE  ACCT #:                  [id number]  MRN:                     [id number]  :                      1960  ADMIT DATE:       10/28/2023 11:50 PM  DISCH DATE:          RESPONDING  PROVIDER #:        [id number]           QUERY TEXT:    Documentation in the medical record indicates that this patient has been diagnosed as having chronic systolic congestive heart failure.    Can the status of the heart failure be further specified based on the clinical indicators and required treatments?      The patient's Clinical Indicators include:  64yo M transferred to [facility] for management of recurrent SBO. Medical hx includes HTN, dyslipidemia, prediabetes, afib, and CHF.     10/31 General Surgery: Poor respiratory status, has not been taking home medications for CHF.  10/31 HM:  He feels short of breath since last night.... His BNP is very high.  Chest x-ray shows pulmonary edema    Clinical Findings: Bilateral interstitial edema and probable small pleural effusions per CXR 10/31, RR>20, HR>100, BNP 3278 (10/31)   Risk Factors: Chronic systolic CHF, hypertensive urgency   Treatment: Lasix IV 40mg BID, amlodipine 5mg QD, labetalol 10mg PRN, metoprolol 50mg BID, Entresto BID, RT consult     Please contact me for any questions.    Thank you for time and attention,  LEIGH [email or web address]  Contact via [employer] Messenger  Options provided:   -- Acute on chronic systolic heart failure   -- Chronic systolic heart failure, acute decompensation ruled out   -- Other explanation, (Please specify other explanation)      Query created by: Alley Lemon on 2023 10:36 AM    RESPONSE TEXT:    Other explanation - Hypertensive urgency          Electronically signed by:  KHAI ESCALONA MD 2023 4:33 PM

## 2023-11-02 NOTE — PROGRESS NOTES
Notified by monitor room that patient had 15 beats of V-tach with max HR of 196 bpm. Upon assessment patient is awake, alert, and asymptomatic. Dr. Hood notified by this RN.

## 2023-11-02 NOTE — DISCHARGE INSTRUCTIONS
Discharge Instructions per Radha Hood M.D.    You were treated for bowel obstruction which resolved.  Since you do take opiate medication chronically, ensure you avoid developing constipation to help prevent recurrence of bowel obstruction.    You developed fluid retention related to high blood pressure and was given IV Lasix diuresis.  Please continue your home medications for blood pressure and Lasix and monitor your blood pressure at home.  Follow-up with your primary care doctor for further management.    While hospitalized, you had intermittent abnormal heartbeats called ventricular tachycardia.  Please follow-up with your cardiologist to consider a home cardiac monitor to evaluate how frequent you develop this abnormal rhythm and whether further intervention is needed.    Bowel Obstruction  A bowel obstruction means that something is blocking the small or large bowel. The bowel is also called the intestine. It is the long tube that connects the stomach to the opening of the butt (anus).  When something blocks the bowel, food and fluids cannot pass through like normal. This condition needs to be treated. Treatment depends on the cause of the problem and how bad the problem is.  What are the causes?  Common causes of this condition include:  Scar tissue inside the body from past surgery or from high-energy X-rays (radiation).  Recent surgery in the belly. This affects how food moves in the bowel.  Some diseases, such as:  Irritation of the lining of the digestive tract (Crohn's disease).  Irritation of small pouches in the bowel (diverticulitis).  Growths or tumors.  A bulging organ or tissue (hernia).  Twisting of the bowel (volvulus).  A swallowed object.  Slipping of a part of the bowel into another part (intussusception).  What are the signs or symptoms?  Symptoms of this condition include:  Pain in the belly.  Feeling like you may vomit (nauseous).  Vomiting.  Bloating in the belly.  Being unable to pass  gas.  Trouble pooping (constipation).  A lot of belching.  Watery poop (diarrhea).  How is this treated?  Treatment for this condition may include:  Fluids and pain medicines that are given through an IV tube. Your doctor may tell you not to eat or drink if you feel like you may vomit or are vomiting.  Eating a clear liquid diet for a few days.  Putting a small tube (nasogastric tube) through the nose, down the throat, and into the stomach. This will help with pain, discomfort, and the feeling like you may vomit.  Surgery. This may be needed if other treatments do not work.  Follow these instructions at home:  Medicines  Take over-the-counter and prescription medicines only as told by your doctor.  If you were prescribed an antibiotic medicine, take it as told by your doctor. Do not stop taking the antibiotic even if you start to feel better.  General instructions  Follow instructions from your doctor about what you can or cannot eat and drink. You may need to:  Only drink clear liquids until you start to get better.  Avoid solid foods.  Return to your normal activities when your doctor says that it is safe.  Rest as told by your doctor.  Get up to take short walks every 1 to 2 hours. Ask for help if you feel weak or unsteady.  Keep all follow-up visits.  How is this prevented?  After having a bowel obstruction, you may be more likely to have another. You can do some things to stop it from happening again.  If you have a long-term (chronic) disease, contact your doctor if you see changes or problems.  Avoid having trouble pooping. You may need to take these actions to prevent or treat trouble pooping:  Drink enough fluid to keep your pee (urine) pale yellow.  Take over-the-counter or prescription medicines.  Eat foods that are high in fiber. These include beans, whole grains, and fresh fruits and vegetables.  Limit foods that are high in fat and sugar. These include fried or sweet foods.  Stay active. Ask your  doctor which exercises are safe for you.  Avoid stress.  Eat three small meals and three small snacks each day.  Work with a diet and nutrition expert (dietitian) to make a meal plan that works for you.  Do not smoke or use any products that contain nicotine or tobacco. If you need help quitting, ask your doctor.  Contact a doctor if:  You have a fever.  You have chills.  Get help right away if:  You have pain or cramps that get worse.  You vomit blood.  You feel like you may vomit and the feeling lasts a long time.  You cannot stop vomiting.  You cannot drink fluids.  You feel confused.  You feel very thirsty (dehydrated).  Your belly gets more bloated.  You feel weak or you faint.  Summary  A bowel obstruction means that something is blocking the small or large bowel.  Treatment may include IV fluids and pain medicine. You may also have a clear liquid diet, a small tube in your stomach, or surgery.  Drink clear liquids and avoid solid foods until you get better, as told by your doctor.  This information is not intended to replace advice given to you by your health care provider. Make sure you discuss any questions you have with your health care provider.  Document Revised: 01/30/2022 Document Reviewed: 01/30/2022  Elsevier Patient Education © 2023 [employer]

## 2023-11-02 NOTE — CARE PLAN
The patient is Stable - Low risk of patient condition declining or worsening    Problem: Knowledge Deficit - Standard  Goal: Patient and family/care givers will demonstrate understanding of plan of care, disease process/condition, diagnostic tests and medications  Outcome: Progressing     Problem: Pain - Standard  Goal: Alleviation of pain or a reduction in pain to the patient’s comfort goal  Outcome: Progressing     Shift Goals  Clinical Goals: tolerate po intake, pain control  Patient Goals: rest, comfort  Family Goals: family not here    Progress made toward(s) clinical / shift goals:  Patient's pain managed per MAR. Patient able to tolerate po intake during this shift. Patient able to rest during this shift.    Patient is not progressing towards the following goals:

## 2023-11-02 NOTE — PROGRESS NOTES
Monitor Summary:   Rhythm: afib  Rate: 83-98  Measurement: /.15/  Ectopy: 6 bvt, o pvc, r coup, f pac

## 2023-11-02 NOTE — PROGRESS NOTES
Bedside report received.  Assessment complete.     A&O x 4. Patient calls appropriately.  Patient ambulates with standby to x1 assist. Bed alarm on.  Patient has 3/10 pain. No pharmacological interventions provided at this time.  Denies N&V. Tolerating full liquid diet.  + void via [place], + flatus, + BM, last BM 11/1.  Patient denies SOB.  SCD's refused, education provided.     Review plan with of care with patient. Call light and personal belongings within reach. Hourly rounding in place. All needs met at this time.

## 2023-11-02 NOTE — PROGRESS NOTES
Received report from previous shift RN  Assessment complete.  A&O x 4. Patient calls appropriately.  Patient ambulates SBA with FWW.   Patient has 5/10 pain. Pain managed with prescribed medication and rest.  Denies N&V. Tolerating regular diet.  + void via [place], + flatus, + BM.  Patient denies SOB/CP  Patient on 2 L via NC  Review plan with of care with patient. Call light and personal belongings with in reach. Hourly rounding in place. All needs met at this time.

## 2023-11-02 NOTE — PROGRESS NOTES
Notifed by monitor tech that patient had 4 beats of V-tach with HR of 146 bpm. Patient diaphoretic but otherwise symptomatic. Dr. Hood notified.

## 2023-11-02 NOTE — PROGRESS NOTES
"    DATE: 11/2/2023    Hospital Day 5  small bowel obstruction    INTERVAL EVENTS:  Tolerating full liquids  + flatus with small bowel movement  WBC trend down    REVIEW OF SYSTEMS:  Review of Systems   Constitutional:  Negative for chills and fever.   Respiratory:  Positive for shortness of breath (with exertion, baseline).    Gastrointestinal:  Negative for abdominal pain, nausea and vomiting.       PHYSICAL EXAMINATION:  Vital Signs: Blood Pressure 129/68   Pulse 95   Temperature 36.8 °C (98.2 °F) (Temporal)   Respiration 18   Height 1.702 m (5' 7\")   Weight (Abnormal) 138 kg (303 lb 9.2 oz)   Oxygen Saturation 94%   Physical Exam  Vitals and nursing note reviewed.   Constitutional:       General: He is not in acute distress.     Appearance: He is obese. He is not toxic-appearing.      Interventions: Nasal cannula in place.   HENT:      Head: Normocephalic.      Mouth/Throat:      Mouth: Mucous membranes are moist.      Pharynx: Oropharynx is clear.   Eyes:      Conjunctiva/sclera: Conjunctivae normal.   Cardiovascular:      Rate and Rhythm: Normal rate and regular rhythm.      Pulses: Normal pulses.   Pulmonary:      Effort: Pulmonary effort is normal.   Abdominal:      General: There is no distension.      Palpations: Abdomen is soft.      Tenderness: There is no abdominal tenderness.      Hernia: A hernia is present.   Genitourinary:     Comments: Delacruz in place with yellow urine   Skin:     General: Skin is warm and dry.   Neurological:      Mental Status: He is alert.         LABORATORY VALUES:   Recent Labs     10/31/23  0654 11/01/23 0355 11/02/23  0429   WBC 16.7* 15.2* 11.4*   RBC 4.52* 4.54* 4.56*   HEMOGLOBIN 14.3 14.7 14.6   HEMATOCRIT 45.3 44.3 44.4   .2* 97.6 97.4   MCH 31.6 32.4 32.0   MCHC 31.6* 33.2 32.9   RDW 50.4* 47.4 46.3   PLATELETCT 287 280 292   MPV 10.5 10.6 11.3     Recent Labs     11/01/23  0355 11/01/23  1629 11/02/23  0429   SODIUM 139 137 138   POTASSIUM 3.6 3.7 3.4* "   CHLORIDE 102 98 98   CO2 25 27 29   GLUCOSE 151* 127* 150*   BUN 21 21 20   CREATININE 0.86 0.79 0.84   CALCIUM 8.9 9.3 9.2     Recent Labs     10/31/23  0654   ASTSGOT 12   ALTSGPT 12   TBILIRUBIN 1.1   ALKPHOSPHAT 49   GLOBULIN 3.4            IMAGING:   DX-CHEST-PORTABLE (1 VIEW)   Final Result      1.  Cardiomegaly.   2.  Bilateral interstitial edema and probable small pleural effusions.      DX-ABDOMEN FOR TUBE PLACEMENT   Final Result         Gastric drainage tube with tip projecting over the expected area of the stomach. Sidehole at GE junction.      [id number] VIEW   Final Result      Stable findings of small bowel instruction. Consider advancing the nasogastric tube further into the stomach.      OUTSIDE IMAGES-CT ABDOMEN /PELVIS   Final Result      OUTSIDE IMAGES-DX CHEST   Final Result          Core Measures & Quality Metrics    ASSESSMENT AND PLAN:   * SBO (small bowel obstruction) (HCC)- (present on admission)  Assessment & Plan  Mult abdominal surgeries  Last Ex lap BARBARA 2/23  Now with recurrent SBO  NGT   Non op management  10/31 + BM, clamp NG tube and start sips of clears  11/1 Tolerating clears, remove NG and advance diet  11/2 Advance diet    - Advance diet as tolerated  - Mobilize  - No role for surgery at this time  - Surgery will sign off, please call with any questions or concerns     Discussed patient condition with RN, Patient, and general surgery, Dr. Donnelly.

## 2023-11-02 NOTE — THERAPY
Physical Therapy   Discharge     Patient Name: Yonny Wallace  Age:  63 y.o., Sex:  male  Medical Record #: [id number]  Today's Date: 11/2/2023     Precautions  Precautions: Fall Risk    Assessment    Pt seen for follow up PT tx. Pt progressed well with mobility compared to evaluation performed yesterday. Pt was able to ambulate long household distances with no AD. He did experience SOB requiring standing rest break which is baseline. Encouraged pt to seek OP PT for chronic back pain to improve overall tolerance to activity. No further acute PT needs.     Plan    Reason for Discharge From Therapy: Discharge Secondary to Goals Met    DC Equipment Recommendations: None  Discharge Recommendations: Recommend outpatient physical therapy services to address higher level deficits         11/02/23 1545   Vitals   O2 (LPM) 2   O2 Delivery Device Nasal Cannula   Pain 0 - 10 Group   Therapist Pain Assessment During Activity;Nurse Notified  (chronic back pain)   Cognition    Level of Consciousness Alert   Comments cooperative   Other Treatments   Other Treatments Provided educated on continuing to ambulate and seeking OP PT for chronic back pain   Balance   Sitting Balance (Static) Fair +   Sitting Balance (Dynamic) Fair +   Standing Balance (Static) Fair   Standing Balance (Dynamic) Fair   Weight Shift Sitting Good   Weight Shift Standing Good   Skilled Intervention Verbal Cuing   Comments no AD   Bed Mobility    Supine to Sit Supervised   Sit to Supine Supervised   Scooting Supervised   Skilled Intervention Verbal Cuing   Comments flat bed   Gait Analysis   Gait Level Of Assist Supervised   Assistive Device None   Distance (Feet) 300   # of Times Distance was Traveled 1   Deviation Increased Base Of Support;Shuffled Gait   Weight Bearing Status no restrictions   Skilled Intervention Verbal Cuing   Comments 1 standing rest break   Functional Mobility   Sit to Stand Supervised   Bed, Chair, Wheelchair Transfer Supervised   Transfer  Method Stand Step   Mobility in room and hallway with no AD   Skilled Intervention Verbal Cuing   Short Term Goals    Short Term Goal # 1 pt will be able to complete functional transfers with no AD and SPV in 6tx in order to dc home   Goal Outcome # 1 Goal met   Short Term Goal # 2 pt will be able to amublate 100ft with no AD and SPV in 6tx in order to dc home   Goal Outcome # 2 Goal met   Physical Therapy Treatment Plan   Physical Therapy Treatment Plan Modify Current Treatment Plan   Reason For Discharge Discharge Secondary to Goals Met   Anticipated Discharge Equipment and Recommendations   DC Equipment Recommendations None   Discharge Recommendations Recommend outpatient physical therapy services to address higher level deficits

## 2023-11-03 VITALS
BODY MASS INDEX: 47.65 KG/M2 | RESPIRATION RATE: 18 BRPM | SYSTOLIC BLOOD PRESSURE: 133 MMHG | OXYGEN SATURATION: 93 % | HEIGHT: 67 IN | DIASTOLIC BLOOD PRESSURE: 82 MMHG | HEART RATE: 85 BPM | TEMPERATURE: 97.9 F | WEIGHT: 303.57 LBS

## 2023-11-03 LAB
ALBUMIN SERPL BCP-MCNC: 3.6 G/DL (ref 3.2–4.9)
BUN SERPL-MCNC: 23 MG/DL (ref 8–22)
CALCIUM ALBUM COR SERPL-MCNC: 9.4 MG/DL (ref 8.5–10.5)
CALCIUM SERPL-MCNC: 9.1 MG/DL (ref 8.5–10.5)
CHLORIDE SERPL-SCNC: 102 MMOL/L (ref 96–112)
CO2 SERPL-SCNC: 24 MMOL/L (ref 20–33)
CREAT SERPL-MCNC: 0.87 MG/DL (ref 0.5–1.4)
GFR SERPLBLD CREATININE-BSD FMLA CKD-EPI: 95 ML/MIN/1.73 M 2
GLUCOSE BLD STRIP.AUTO-MCNC: 143 MG/DL (ref 65–99)
GLUCOSE BLD STRIP.AUTO-MCNC: 150 MG/DL (ref 65–99)
GLUCOSE SERPL-MCNC: 172 MG/DL (ref 65–99)
MAGNESIUM SERPL-MCNC: 1.9 MG/DL (ref 1.5–2.5)
PHOSPHATE SERPL-MCNC: 3.1 MG/DL (ref 2.5–4.5)
POTASSIUM SERPL-SCNC: 3.6 MMOL/L (ref 3.6–5.5)
SODIUM SERPL-SCNC: 139 MMOL/L (ref 135–145)

## 2023-11-03 PROCEDURE — 36415 COLL VENOUS BLD VENIPUNCTURE: CPT

## 2023-11-03 PROCEDURE — A9270 NON-COVERED ITEM OR SERVICE: HCPCS | Performed by: INTERNAL MEDICINE

## 2023-11-03 PROCEDURE — 94660 CPAP INITIATION&MGMT: CPT

## 2023-11-03 PROCEDURE — 80069 RENAL FUNCTION PANEL: CPT

## 2023-11-03 PROCEDURE — A9270 NON-COVERED ITEM OR SERVICE: HCPCS | Performed by: STUDENT IN AN ORGANIZED HEALTH CARE EDUCATION/TRAINING PROGRAM

## 2023-11-03 PROCEDURE — 700102 HCHG RX REV CODE 250 W/ 637 OVERRIDE(OP): Mod: JZ | Performed by: INTERNAL MEDICINE

## 2023-11-03 PROCEDURE — 700111 HCHG RX REV CODE 636 W/ 250 OVERRIDE (IP): Mod: JZ | Performed by: INTERNAL MEDICINE

## 2023-11-03 PROCEDURE — 700102 HCHG RX REV CODE 250 W/ 637 OVERRIDE(OP): Performed by: STUDENT IN AN ORGANIZED HEALTH CARE EDUCATION/TRAINING PROGRAM

## 2023-11-03 PROCEDURE — 99239 HOSP IP/OBS DSCHRG MGMT >30: CPT | Performed by: INTERNAL MEDICINE

## 2023-11-03 PROCEDURE — 83735 ASSAY OF MAGNESIUM: CPT

## 2023-11-03 PROCEDURE — 82962 GLUCOSE BLOOD TEST: CPT | Mod: 91

## 2023-11-03 RX ORDER — LANOLIN ALCOHOL/MO/W.PET/CERES
400 CREAM (GRAM) TOPICAL DAILY
Status: DISCONTINUED | OUTPATIENT
Start: 2023-11-03 | End: 2023-11-03 | Stop reason: HOSPADM

## 2023-11-03 RX ORDER — POTASSIUM CHLORIDE 20 MEQ/1
20 TABLET, EXTENDED RELEASE ORAL ONCE
Status: COMPLETED | OUTPATIENT
Start: 2023-11-03 | End: 2023-11-03

## 2023-11-03 RX ADMIN — DABIGATRAN ETEXILATE MESYLATE 150 MG: 150 CAPSULE ORAL at 04:51

## 2023-11-03 RX ADMIN — AMLODIPINE BESYLATE 5 MG: 10 TABLET ORAL at 04:51

## 2023-11-03 RX ADMIN — FUROSEMIDE 40 MG: 10 INJECTION, SOLUTION INTRAMUSCULAR; INTRAVENOUS at 04:52

## 2023-11-03 RX ADMIN — OXYCODONE 10 MG: 5 TABLET ORAL at 03:38

## 2023-11-03 RX ADMIN — SACUBITRIL AND VALSARTAN 1 TABLET: 24; 26 TABLET, FILM COATED ORAL at 04:54

## 2023-11-03 RX ADMIN — Medication 400 MG: at 08:32

## 2023-11-03 RX ADMIN — OXYCODONE 5 MG: 5 TABLET ORAL at 08:32

## 2023-11-03 RX ADMIN — POTASSIUM CHLORIDE 20 MEQ: 1500 TABLET, EXTENDED RELEASE ORAL at 08:33

## 2023-11-03 RX ADMIN — FLUOXETINE 60 MG: 20 CAPSULE ORAL at 04:51

## 2023-11-03 NOTE — PROGRESS NOTES
Bedside report received.  Assessment complete.  A&O x 4. Patient calls appropriately.  Patient ambulates with SB assist.    Patient has 7/10 pain. Pain managed with prescribed medications.  Denies N&V. Tolerating regular diet.  + void, + flatus, + BM.  Patient denies SOB.  SCD's refused.    Review plan with of care with patient. Call light and personal belongings within reach. Hourly rounding in place. All needs met at this time.

## 2023-11-03 NOTE — PROGRESS NOTES
Patient discharged to home with family and staff escort. IV discontinued. Prescriptions sent to patient's preferred pharmacy, verbalized understanding, consent signed and in chart. Discharge instructions given regarding bowel obstructions, follow up appointments, and when to seek medical attention.  Education provided, patient asked questions and verbalized understanding. Discharge paperwork signed and in chart. Patient is tolerating diet, stable when ambulating, and pain is well controlled. All belongings collected. No further questions or concerns at this time.

## 2023-11-03 NOTE — CARE PLAN
The patient is Stable - Low risk of patient condition declining or worsening    Shift Goals  Clinical Goals: advance diet, pain control, ambulate  Patient Goals: rest, comfort  Family Goals: JAMI, none present    Progress made toward(s) clinical / shift goals:  patient tolerating advancement to regular diet; patient mobilized in hallways x 3 with staff; assess pain Q2-4H, administer PRNs as ordered, encourage patient to voice pain to staff     Patient is not progressing towards the following goals:

## 2023-11-03 NOTE — PROGRESS NOTES
Monitor Summary:  Afib 76-89  (R) couplets, (R) trigeminy, (O) PVC  15 beats of Vtach , 4 beats of Vtach   -/.15/-

## 2023-11-03 NOTE — CARE PLAN
The patient is Stable - Low risk of patient condition declining or worsening    Shift Goals  Clinical Goals: wean O2, pain > 6 by discharge  Patient Goals: discharge  Family Goals: none at bedside    Progress made toward(s) clinical / shift goals: Patient's SpO2 > 89% on RA at rest and with ambulation.     Patient is not progressing towards the following goals:

## 2023-11-03 NOTE — PROGRESS NOTES
Monitor summary    Rhythm: Afib  Rate:   Ectopy: (R)-(O) PVC, (R) COUP, 5 bt vtach  Measurement: .0/.11/.35

## 2023-11-03 NOTE — DISCHARGE SUMMARY
Discharge Summary    CHIEF COMPLAINT ON ADMISSION  No chief complaint on file.      Reason for Admission  SBO     Admission Date  10/28/2023    CODE STATUS  Full code    HPI & HOSPITAL COURSE  62 yo man with A-fib, CHF, HTN, HLD who has had multiple prior abdominal surgeries and SBO who presented with abdominal pain and had CTAP that showed an SBO.  Surgery was consulted.  He was decompressed with NG tube.  His bowel obstruction slowly improved, his diet was advanced and tolerated.  During his hospitalization he developed hypertensive urgency resulting in pulmonary edema and acute hypoxic respiratory failure.  He was restarted on his home meds and given IV Lasix diuresis with improvement.  He was weaned to room air.  I discussed with him to monitor his blood pressure at home and follow-up with his PCP.  He was also noted to have intermittent NSVT, asymptomatic.  I discussed with the patient to follow-up with his cardiologist to consider a cardiac monitor.    Therefore, he is discharged in good and stable condition to home with organized home healthcare and close outpatient follow-up.    The patient met 2-midnight criteria for an inpatient stay at the time of discharge.    Discharge Date  11/3/2023    FOLLOW UP ITEMS POST DISCHARGE  Outpatient HTN management  Follow-up with cardiologist with consideration of cardiac monitor for recurring NSVT    DISCHARGE DIAGNOSES  Principal Problem:    SBO (small bowel obstruction) (HCC) (POA: Yes)  Active Problems:    Primary hypertension (POA: Yes)    HEATHER (obstructive sleep apnea) (POA: Yes)    Atrial fibrillation with RVR (HCC) (POA: Yes)    Mixed hyperlipidemia (POA: Yes)    Chronic systolic congestive heart failure (HCC) (POA: Yes)    SUHAS (acute kidney injury) (HCC) (POA: Yes)    Acute respiratory failure with hypoxia (HCC) (POA: Yes)  Resolved Problems:    * No resolved hospital problems. *      FOLLOW UP  [facility]  [address]  [place]  [address]  [phone]          MEDICATIONS ON DISCHARGE     Medication List        CHANGE how you take these medications        Instructions   * ROPINIRole 1 MG Tabs  What changed:   how much to take  how to take this  when to take this  Commonly known as: Requip   1 tab at hs with an increasing amount of the 0.25 mg tablets as directed     * ROPINIRole 0.25 MG Tabs  What changed: See the new instructions.  Commonly known as: Requip   TAKE 3 TO 7 TABLETS BY MOUTH AT BEDTIME WITH 1 MG TABLET AS DICTATED BY SYMPTOMS           * This list has 2 medication(s) that are the same as other medications prescribed for you. Read the directions carefully, and ask your doctor or other care provider to review them with you.                CONTINUE taking these medications        Instructions   albuterol 108 (90 Base) MCG/ACT Aers inhalation aerosol  Commonly known as: Ventolin HFA   INHALE TWO PUFFS BY MOUTH EVERY 6 HOURS AS NEEDED FOR SHORTNESS OF BREATH     amLODIPine 5 MG Tabs  Commonly known as: Norvasc   TAKE 1 TABLET BY MOUTH DAILY  Dose: 5 mg     Blood Glucose Test Strips   Test strips for Freestyle meter. Use to test blood sugars 2-3 times daily, Diagnosis code E11.9     CareTouch 2 CPAP Hose  Misc   CPAP     dabigatran 150 MG Caps capsule  Commonly known as: Pradaxa   TAKE ONE CAPSULE BY MOUTH TWICE A DAY     dapagliflozin propanediol 5 MG Tabs  Commonly known as: Farxiga   Take 1 Tablet by mouth every day.  Dose: 5 mg     Entresto 24-26 MG Tabs  Generic drug: sacubitril-valsartan   Take 1 Tablet by mouth 2 times a day.  Dose: 1 Tablet     EPINEPHrine 0.3 MG/0.3ML Soaj solution for injection  Commonly known as: Epipen   Inject 0.3 mL into the shoulder, thigh, or buttocks as needed.  Dose: 0.3 mg     FLUoxetine 20 MG Caps  Commonly known as: PROzac   TAKE THREE CAPSULES BY MOUTH EVERY MORNING     furosemide 20 MG Tabs  Commonly known as: Lasix   2 tablets daily in am     gabapentin 300 MG Caps  Commonly known as:  Neurontin   Take 300 mg by mouth 2 times a day.  Dose: 300 mg     hydrocortisone 2.5 % Crea topical cream   2 times daily as needed     hydrocortisone rectal 2.5% Crea  Commonly known as: Perianal   APPLY CREAM TO THE AFFECTED AREA(S) TWO TIMES A DAY AS DIRECTED     Lancets   Lancets for Freestyle meter. Use to test blood sugars 2-3 times daily, Diagnosis code E11.9     metoprolol  MG Tb24  Commonly known as: Toprol XL   Take 1 Tablet by mouth every day.  Dose: 100 mg     Movantik 25 MG Tabs  Generic drug: Naloxegol Oxalate   TAKE ONE TABLET BY MOUTH OR FEEDING TUBE DAILY     oxyCODONE HCl ER 15 MG T12a   Take 15 mg by mouth 2 times a day.  Dose: 15 mg     polyethylene glycol/lytes 17 g Pack  Commonly known as: Miralax   Take 1 Packet by mouth every day.  Dose: 17 g     potassium chloride 10 MEQ capsule  Commonly known as: Micro-K   Take 1 capsule by mouth 2 times a day.  Dose: 10 mEq     pravastatin 40 MG tablet  Commonly known as: Pravachol   TAKE ONE TABLET BY MOUTH EVERY EVENING     terazosin 1 MG Caps  Commonly known as: Hytrin   TAKE ONE CAPSULE BY MOUTH EVERY MORNING AND TAKE TWO CAPSULES BY MOUTH AT BEDTIME     thiamine 100 MG tablet  Commonly known as: Thiamine   Take 1 tablet by mouth every day.  Dose: 100 mg     tizanidine 2 MG tablet  Commonly known as: Zanaflex   Take 4 mg by mouth at bedtime. 4MG=2 TABLETS  Dose: 4 mg              Allergies  Allergies   Allergen Reactions    Bee Venom Anaphylaxis    Honey Bee Venom     Morphine Itching       DIET  No orders of the defined types were placed in this encounter.      ACTIVITY  As tolerated.    CONSULTATIONS  General surgery, Dr. Wells    PROCEDURES  DX-CHEST-PORTABLE (1 VIEW)   Final Result      1.  Cardiomegaly.   2.  Bilateral interstitial edema and probable small pleural effusions.      DX-ABDOMEN FOR TUBE PLACEMENT   Final Result         Gastric drainage tube with tip projecting over the expected area of the stomach. Sidehole at GE junction.       [id number] VIEW   Final Result      Stable findings of small bowel instruction. Consider advancing the nasogastric tube further into the stomach.      OUTSIDE IMAGES-CT ABDOMEN /PELVIS   Final Result      OUTSIDE IMAGES-DX CHEST   Final Result            LABORATORY  Lab Results   Component Value Date    SODIUM 139 11/03/2023    POTASSIUM 3.6 11/03/2023    CHLORIDE 102 11/03/2023    CO2 24 11/03/2023    GLUCOSE 172 (H) 11/03/2023    BUN 23 (H) 11/03/2023    CREATININE 0.87 11/03/2023    GLOMRATE 74 07/29/2022        Lab Results   Component Value Date    WBC 11.4 (H) 11/02/2023    HEMOGLOBIN 14.6 11/02/2023    HEMATOCRIT 44.4 11/02/2023    PLATELETCT 292 11/02/2023        Total time of the discharge process exceeds 34 minutes.

## 2024-02-05 ENCOUNTER — HOSPITAL ENCOUNTER (OUTPATIENT)
Dept: RADIOLOGY | Facility: MEDICAL CENTER | Age: 64
End: 2024-02-05

## 2024-03-01 PROBLEM — D17.9 LIPOMA: Status: ACTIVE | Noted: 2024-03-01

## (undated) DEVICE — GLOVE BIOGEL SZ 7.5 SURGICAL PF LTX - (50PR/BX 4BX/CA)

## (undated) DEVICE — SUCTION INSTRUMENT YANKAUER BULBOUS TIP W/O VENT (50EA/CA)

## (undated) DEVICE — SUTURE 3-0 VICRYL PLUS SH - 8X 18 INCH (12/BX)

## (undated) DEVICE — SUTURE GENERAL

## (undated) DEVICE — CHLORAPREP 26 ML APPLICATOR - ORANGE TINT(25/CA)

## (undated) DEVICE — SET EXTENSION WITH 2 PORTS (48EA/CA) ***PART #2C8610 IS A SUBSTITUTE*****

## (undated) DEVICE — SLEEVE, VASO, THIGH, MED

## (undated) DEVICE — STAPLER SKIN DISP - (6/BX 10BX/CA) VISISTAT

## (undated) DEVICE — TUBE E-T HI-LO CUFF 8.0MM (10EA/PK)

## (undated) DEVICE — GOWN WARMING STANDARD FLEX - (30/CA)

## (undated) DEVICE — SET LEADWIRE 5 LEAD BEDSIDE DISPOSABLE ECG (1SET OF 5/EA)

## (undated) DEVICE — DRAPE MAYO STAND - (30/CA)

## (undated) DEVICE — PAD LAP STERILE 18 X 18 - (5/PK 40PK/CA)

## (undated) DEVICE — PACK TOTAL KNEE  (1/CA)

## (undated) DEVICE — LACTATED RINGERS INJ 1000 ML - (14EA/CA 60CA/PF)

## (undated) DEVICE — GOWN SURGEONS X-LARGE - DISP. (30/CA)

## (undated) DEVICE — COVER LIGHT HANDLE ALC PLUS DISP (18EA/BX)

## (undated) DEVICE — SENSOR OXIMETER ADULT SPO2 RD SET (20EA/BX)

## (undated) DEVICE — ELECTRODE DUAL RETURN W/ CORD - (50/PK)

## (undated) DEVICE — TOWELS CLOTH SURGICAL - (4/PK 20PK/CA)

## (undated) DEVICE — TUBING CLEARLINK DUO-VENT - C-FLO (48EA/CA)

## (undated) DEVICE — SODIUM CHL IRRIGATION 0.9% 1000ML (12EA/CA)

## (undated) DEVICE — DRAPE LAPAROTOMY T SHEET - (12EA/CA)

## (undated) DEVICE — SPONGE GAUZESTER 4 X 4 4PLY - (128PK/CA)

## (undated) DEVICE — BOVIE  BLADE 6 EXTENDED - (50/PK)

## (undated) DEVICE — GLOVE BIOGEL INDICATOR SZ 7.5 SURGICAL PF LTX - (50PR/BX 4BX/CA)

## (undated) DEVICE — SUTURE 1 VICRYL PLUS CTX - 36 INCH (36/BX)

## (undated) DEVICE — CANISTER SUCTION 3000ML MECHANICAL FILTER AUTO SHUTOFF MEDI-VAC NONSTERILE LF DISP  (40EA/CA)

## (undated) DEVICE — GOWN SURGICAL X-LARGE ULTRA - FILM-REINFORCED (20/CA)

## (undated) DEVICE — MAT PATIENT POSITIONING PREVALON (10EA/CA)

## (undated) DEVICE — GLOVE BIOGEL PI ORTHO SZ 6 SURGICAL PF LF (40PR/BX)